# Patient Record
Sex: MALE | Race: WHITE | NOT HISPANIC OR LATINO | URBAN - METROPOLITAN AREA
[De-identification: names, ages, dates, MRNs, and addresses within clinical notes are randomized per-mention and may not be internally consistent; named-entity substitution may affect disease eponyms.]

---

## 2019-09-27 ENCOUNTER — EMERGENCY (EMERGENCY)
Facility: HOSPITAL | Age: 84
LOS: 1 days | End: 2019-09-27
Admitting: EMERGENCY MEDICINE
Payer: MEDICARE

## 2019-09-27 ENCOUNTER — INPATIENT (INPATIENT)
Facility: HOSPITAL | Age: 84
LOS: 17 days | DRG: 23 | End: 2019-10-15
Attending: HOSPITALIST | Admitting: SURGERY
Payer: MEDICARE

## 2019-09-27 VITALS
RESPIRATION RATE: 20 BRPM | HEART RATE: 73 BPM | DIASTOLIC BLOOD PRESSURE: 66 MMHG | OXYGEN SATURATION: 100 % | SYSTOLIC BLOOD PRESSURE: 140 MMHG

## 2019-09-27 DIAGNOSIS — I16.1 HYPERTENSIVE EMERGENCY: ICD-10-CM

## 2019-09-27 DIAGNOSIS — J96.01 ACUTE RESPIRATORY FAILURE WITH HYPOXIA: ICD-10-CM

## 2019-09-27 DIAGNOSIS — I62.9 NONTRAUMATIC INTRACRANIAL HEMORRHAGE, UNSPECIFIED: ICD-10-CM

## 2019-09-27 DIAGNOSIS — I61.9 NONTRAUMATIC INTRACEREBRAL HEMORRHAGE, UNSPECIFIED: ICD-10-CM

## 2019-09-27 DIAGNOSIS — E03.9 HYPOTHYROIDISM, UNSPECIFIED: ICD-10-CM

## 2019-09-27 DIAGNOSIS — I61.5 NONTRAUMATIC INTRACEREBRAL HEMORRHAGE, INTRAVENTRICULAR: ICD-10-CM

## 2019-09-27 LAB
ALBUMIN SERPL ELPH-MCNC: 3.9 G/DL — SIGNIFICANT CHANGE UP (ref 3.3–5.2)
ALP SERPL-CCNC: 65 U/L — SIGNIFICANT CHANGE UP (ref 40–120)
ALT FLD-CCNC: 13 U/L — SIGNIFICANT CHANGE UP
ANION GAP SERPL CALC-SCNC: 12 MMOL/L — SIGNIFICANT CHANGE UP (ref 5–17)
APTT BLD: 27.2 SEC — LOW (ref 27.5–36.3)
AST SERPL-CCNC: 16 U/L — SIGNIFICANT CHANGE UP
BASOPHILS # BLD AUTO: 0.05 K/UL — SIGNIFICANT CHANGE UP (ref 0–0.2)
BASOPHILS NFR BLD AUTO: 0.5 % — SIGNIFICANT CHANGE UP (ref 0–2)
BILIRUB SERPL-MCNC: 0.4 MG/DL — SIGNIFICANT CHANGE UP (ref 0.4–2)
BLD GP AB SCN SERPL QL: SIGNIFICANT CHANGE UP
BUN SERPL-MCNC: 16 MG/DL — SIGNIFICANT CHANGE UP (ref 8–20)
CALCIUM SERPL-MCNC: 8.6 MG/DL — SIGNIFICANT CHANGE UP (ref 8.6–10.2)
CHLORIDE SERPL-SCNC: 102 MMOL/L — SIGNIFICANT CHANGE UP (ref 98–107)
CO2 SERPL-SCNC: 24 MMOL/L — SIGNIFICANT CHANGE UP (ref 22–29)
CREAT SERPL-MCNC: 0.84 MG/DL — SIGNIFICANT CHANGE UP (ref 0.5–1.3)
EOSINOPHIL # BLD AUTO: 0.02 K/UL — SIGNIFICANT CHANGE UP (ref 0–0.5)
EOSINOPHIL NFR BLD AUTO: 0.2 % — SIGNIFICANT CHANGE UP (ref 0–6)
GAS PNL BLDA: SIGNIFICANT CHANGE UP
GLUCOSE SERPL-MCNC: 102 MG/DL — SIGNIFICANT CHANGE UP (ref 70–115)
HCT VFR BLD CALC: 40.2 % — SIGNIFICANT CHANGE UP (ref 39–50)
HGB BLD-MCNC: 12.7 G/DL — LOW (ref 13–17)
IMM GRANULOCYTES NFR BLD AUTO: 0.7 % — SIGNIFICANT CHANGE UP (ref 0–1.5)
INR BLD: 0.98 RATIO — SIGNIFICANT CHANGE UP (ref 0.88–1.16)
LYMPHOCYTES # BLD AUTO: 0.72 K/UL — LOW (ref 1–3.3)
LYMPHOCYTES # BLD AUTO: 6.9 % — LOW (ref 13–44)
MAGNESIUM SERPL-MCNC: 1.8 MG/DL — SIGNIFICANT CHANGE UP (ref 1.6–2.6)
MCHC RBC-ENTMCNC: 31.3 PG — SIGNIFICANT CHANGE UP (ref 27–34)
MCHC RBC-ENTMCNC: 31.6 GM/DL — LOW (ref 32–36)
MCV RBC AUTO: 99 FL — SIGNIFICANT CHANGE UP (ref 80–100)
MONOCYTES # BLD AUTO: 0.79 K/UL — SIGNIFICANT CHANGE UP (ref 0–0.9)
MONOCYTES NFR BLD AUTO: 7.5 % — SIGNIFICANT CHANGE UP (ref 2–14)
NEUTROPHILS # BLD AUTO: 8.84 K/UL — HIGH (ref 1.8–7.4)
NEUTROPHILS NFR BLD AUTO: 84.2 % — HIGH (ref 43–77)
PLATELET # BLD AUTO: 156 K/UL — SIGNIFICANT CHANGE UP (ref 150–400)
POTASSIUM SERPL-MCNC: 4 MMOL/L — SIGNIFICANT CHANGE UP (ref 3.5–5.3)
POTASSIUM SERPL-SCNC: 4 MMOL/L — SIGNIFICANT CHANGE UP (ref 3.5–5.3)
PROT SERPL-MCNC: 6.6 G/DL — SIGNIFICANT CHANGE UP (ref 6.6–8.7)
PROTHROM AB SERPL-ACNC: 11.3 SEC — SIGNIFICANT CHANGE UP (ref 10–12.9)
RBC # BLD: 4.06 M/UL — LOW (ref 4.2–5.8)
RBC # FLD: 14 % — SIGNIFICANT CHANGE UP (ref 10.3–14.5)
SODIUM SERPL-SCNC: 138 MMOL/L — SIGNIFICANT CHANGE UP (ref 135–145)
TROPONIN T SERPL-MCNC: <0.01 NG/ML — SIGNIFICANT CHANGE UP (ref 0–0.06)
WBC # BLD: 10.49 K/UL — SIGNIFICANT CHANGE UP (ref 3.8–10.5)
WBC # FLD AUTO: 10.49 K/UL — SIGNIFICANT CHANGE UP (ref 3.8–10.5)

## 2019-09-27 PROCEDURE — 99291 CRITICAL CARE FIRST HOUR: CPT | Mod: 25

## 2019-09-27 PROCEDURE — 93010 ELECTROCARDIOGRAM REPORT: CPT

## 2019-09-27 PROCEDURE — 99291 CRITICAL CARE FIRST HOUR: CPT

## 2019-09-27 PROCEDURE — 61210 BURR HOLE IMPLT VENTR CATH: CPT

## 2019-09-27 PROCEDURE — 72125 CT NECK SPINE W/O DYE: CPT | Mod: 26

## 2019-09-27 PROCEDURE — 31500 INSERT EMERGENCY AIRWAY: CPT

## 2019-09-27 PROCEDURE — 70498 CT ANGIOGRAPHY NECK: CPT | Mod: 26

## 2019-09-27 PROCEDURE — 70450 CT HEAD/BRAIN W/O DYE: CPT | Mod: 26

## 2019-09-27 PROCEDURE — 71045 X-RAY EXAM CHEST 1 VIEW: CPT | Mod: 26

## 2019-09-27 PROCEDURE — 70496 CT ANGIOGRAPHY HEAD: CPT | Mod: 26

## 2019-09-27 PROCEDURE — 70450 CT HEAD/BRAIN W/O DYE: CPT | Mod: 26,59

## 2019-09-27 PROCEDURE — 99223 1ST HOSP IP/OBS HIGH 75: CPT | Mod: 25

## 2019-09-27 RX ORDER — CHLORHEXIDINE GLUCONATE 213 G/1000ML
15 SOLUTION TOPICAL EVERY 12 HOURS
Refills: 0 | Status: DISCONTINUED | OUTPATIENT
Start: 2019-09-27 | End: 2019-09-28

## 2019-09-27 RX ORDER — NIMODIPINE 60 MG/10ML
60 SOLUTION ORAL EVERY 4 HOURS
Refills: 0 | Status: DISCONTINUED | OUTPATIENT
Start: 2019-09-27 | End: 2019-09-27

## 2019-09-27 RX ORDER — PROPOFOL 10 MG/ML
10 INJECTION, EMULSION INTRAVENOUS
Qty: 500 | Refills: 0 | Status: DISCONTINUED | OUTPATIENT
Start: 2019-09-27 | End: 2019-09-27

## 2019-09-27 RX ORDER — CEFAZOLIN SODIUM 1 G
2000 VIAL (EA) INJECTION ONCE
Refills: 0 | Status: COMPLETED | OUTPATIENT
Start: 2019-09-27 | End: 2019-09-27

## 2019-09-27 RX ORDER — MIDAZOLAM HYDROCHLORIDE 1 MG/ML
2 INJECTION, SOLUTION INTRAMUSCULAR; INTRAVENOUS ONCE
Refills: 0 | Status: DISCONTINUED | OUTPATIENT
Start: 2019-09-27 | End: 2019-09-27

## 2019-09-27 RX ORDER — FENTANYL CITRATE 50 UG/ML
50 INJECTION INTRAVENOUS ONCE
Refills: 0 | Status: DISCONTINUED | OUTPATIENT
Start: 2019-09-27 | End: 2019-09-27

## 2019-09-27 RX ORDER — DESMOPRESSIN ACETATE 0.1 MG/1
0.4 TABLET ORAL ONCE
Refills: 0 | Status: DISCONTINUED | OUTPATIENT
Start: 2019-09-27 | End: 2019-09-27

## 2019-09-27 RX ORDER — FENTANYL CITRATE 50 UG/ML
0.5 INJECTION INTRAVENOUS
Qty: 2500 | Refills: 0 | Status: DISCONTINUED | OUTPATIENT
Start: 2019-09-27 | End: 2019-09-28

## 2019-09-27 RX ORDER — PROPOFOL 10 MG/ML
10 INJECTION, EMULSION INTRAVENOUS
Qty: 1000 | Refills: 0 | Status: DISCONTINUED | OUTPATIENT
Start: 2019-09-27 | End: 2019-09-27

## 2019-09-27 RX ORDER — PANTOPRAZOLE SODIUM 20 MG/1
40 TABLET, DELAYED RELEASE ORAL DAILY
Refills: 0 | Status: DISCONTINUED | OUTPATIENT
Start: 2019-09-27 | End: 2019-10-08

## 2019-09-27 RX ORDER — CHLORHEXIDINE GLUCONATE 213 G/1000ML
1 SOLUTION TOPICAL ONCE
Refills: 0 | Status: COMPLETED | OUTPATIENT
Start: 2019-09-27 | End: 2019-09-28

## 2019-09-27 RX ORDER — CALCIUM GLUCONATE 100 MG/ML
2 VIAL (ML) INTRAVENOUS ONCE
Refills: 0 | Status: COMPLETED | OUTPATIENT
Start: 2019-09-27 | End: 2019-09-27

## 2019-09-27 RX ORDER — SODIUM CHLORIDE 9 MG/ML
1000 INJECTION INTRAMUSCULAR; INTRAVENOUS; SUBCUTANEOUS
Refills: 0 | Status: DISCONTINUED | OUTPATIENT
Start: 2019-09-27 | End: 2019-10-02

## 2019-09-27 RX ORDER — PROPOFOL 10 MG/ML
30 INJECTION, EMULSION INTRAVENOUS
Qty: 1000 | Refills: 0 | Status: DISCONTINUED | OUTPATIENT
Start: 2019-09-27 | End: 2019-09-28

## 2019-09-27 RX ORDER — DESMOPRESSIN ACETATE 0.1 MG/1
34 TABLET ORAL ONCE
Refills: 0 | Status: COMPLETED | OUTPATIENT
Start: 2019-09-27 | End: 2019-09-27

## 2019-09-27 RX ADMIN — FENTANYL CITRATE 50 MICROGRAM(S): 50 INJECTION INTRAVENOUS at 17:57

## 2019-09-27 RX ADMIN — FENTANYL CITRATE 50 MICROGRAM(S): 50 INJECTION INTRAVENOUS at 17:37

## 2019-09-27 RX ADMIN — CHLORHEXIDINE GLUCONATE 15 MILLILITER(S): 213 SOLUTION TOPICAL at 19:27

## 2019-09-27 RX ADMIN — Medication 200 GRAM(S): at 19:38

## 2019-09-27 RX ADMIN — Medication 2000 MILLIGRAM(S): at 12:38

## 2019-09-27 RX ADMIN — MIDAZOLAM HYDROCHLORIDE 2 MILLIGRAM(S): 1 INJECTION, SOLUTION INTRAMUSCULAR; INTRAVENOUS at 14:29

## 2019-09-27 RX ADMIN — FENTANYL CITRATE 50 MICROGRAM(S): 50 INJECTION INTRAVENOUS at 14:29

## 2019-09-27 RX ADMIN — DESMOPRESSIN ACETATE 234 MICROGRAM(S): 0.1 TABLET ORAL at 13:34

## 2019-09-27 RX ADMIN — FENTANYL CITRATE 50 MICROGRAM(S): 50 INJECTION INTRAVENOUS at 14:30

## 2019-09-27 RX ADMIN — FENTANYL CITRATE 50 MICROGRAM(S): 50 INJECTION INTRAVENOUS at 15:30

## 2019-09-27 RX ADMIN — Medication 100 MILLIGRAM(S): at 12:25

## 2019-09-27 RX ADMIN — PROPOFOL 5.16 MICROGRAM(S)/KG/MIN: 10 INJECTION, EMULSION INTRAVENOUS at 12:37

## 2019-09-27 RX ADMIN — FENTANYL CITRATE 50 MICROGRAM(S): 50 INJECTION INTRAVENOUS at 15:00

## 2019-09-27 NOTE — PROGRESS NOTE ADULT - SUBJECTIVE AND OBJECTIVE BOX
83 yo M with PMH of HTN, presents as transfer from [facility] with L BG ICH with IVH.   Last seen normal was 10 pm last night, wife found him confused, agitated. Possible seizure.  Received Keppra and mannitol.  Of note, takes ASA 81 daily, no other AC/antiplts.    On admission, the patient was:  GCS: 11t ([id number]).  ICH score: 3    VITALS: reviewed.  LABS: reviewed.  IMAGING: Recent imaging studies were reviewed.  MEDICATIONS: reviewed.    EXAMINATION: pt is intubated, off sedation.  General:  calm, cooperative.  HEENT:  EO to voice, EOMI, PERRLA.  Neuro:  awake, alert, follows commands, moves all extremities: LUE 5/5, LLE 5/5, RUE 4-/5 + drift, RLE 3/5.  Cards:  S1S2 present.  Respiratory:  no respiratory distress, intubated.  Abdomen:  soft  Extremities:  no edema  Skin:  warm/dry

## 2019-09-27 NOTE — CONSULT NOTE ADULT - ASSESSMENT
83 y/o male transferred from [facility] with a large left basal ganglia hemorrhage, intubated , will need an EVD

## 2019-09-27 NOTE — ED PROVIDER NOTE - PHYSICAL EXAMINATION
VITAL SIGNS: I have reviewed nursing notes and confirm.  CONSTITUTIONAL: Well-developed; (+) intubated   SKIN: Skin exam is warm and dry, no acute rash.  HEAD: Normocephalic;   EYES: PERRL, ; conjunctiva and sclera clear.  ENT: (+) intubated   NECK: Supple; non tender.    CARD: S1, S2 normal; no murmurs, gallops, or rubs. Regular rate and rhythm.  RESP: No wheezes,  no rales or rhonchi.   ABD:  soft; non-distended; non-tender;   EXT: Normal ROM. No clubbing, cyanosis or edema.  NEURO: (+) intubated (+) follows command (+) moves all extremity.

## 2019-09-27 NOTE — CONSULT NOTE ADULT - SUBJECTIVE AND OBJECTIVE BOX
HPI:  83 y/o male transferred from [facility] with a 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 Spoke with patients wife about events this morning. As per wife ,  woke up normal , walked around but came back to bed. When he came into bed, his legs stiffened, he was not able to talk, rolled his legs off the bed, his body followed. His wife put a pillow under his head, he began to foam at the mouth.  No loss of urine or tongue biting. Wife called ambulance, he was taken to [facility], intubated for airway protection, cat scan results show large left basal ganglia hemorrhage. Patient was than transferred to [facility] for higher level of care      PAST MEDICAL & SURGICAL HISTORY: HTN, Hypothyroid      REVIEW OF SYSTEMS: unable to ask as pt is intubated       General:	    Skin/Breast:  	  Ophthalmologic:  	  ENMT:	    Respiratory and Thorax:  	  Cardiovascular:	    Gastrointestinal:	    Genitourinary:	    Musculoskeletal:	    Neurological:	    Psychiatric:	    Hematology/Lymphatics:	    Endocrine:	    Allergic/Immunologic:	    MEDICATIONS  (STANDING):  desmopressin IVPB 34 MICROGram(s) IV Intermittent once  propofol Infusion 10 MICROgram(s)/kG/Min (5.16 mL/Hr) IV Continuous <Continuous>    MEDICATIONS  (PRN):      Allergies    No Known Allergies    Intolerances        SOCIAL HISTORY: unknown     FAMILY HISTORY: lives with his wife      Vital Signs Last 24 Hrs  T(C): 36.4 (27 Sep 2019 12:50), Max: 36.4 (27 Sep 2019 12:50)  T(F): 97.6 (27 Sep 2019 12:50), Max: 97.6 (27 Sep 2019 12:50)  HR: 62 (27 Sep 2019 13:09) (55 - 79)  BP: 149/62 (27 Sep 2019 13:09) (140/66 - 159/72)  BP(mean): --  RR: 20 (27 Sep 2019 13:09) (20 - 21)  SpO2: 99% (27 Sep 2019 13:09) (98% - 100%)    PHYSICAL EXAM:      Constitutional: intubated, not on sedation. Appears stated age.         Neurological: intubated, not sedated. Following commands, shows 2 fingers.  Opens eyes to name. Pupils equally reactive bilaterally. RESENDIZ x4 with the right side  weaker than right. LUE/LLE 5/5 and RUE 4/5 , RLE 3/5. + right UE drift        LABS:                        12.7   10.49 )-----------( 156      ( 27 Sep 2019 12:00 )             40.2     09-27    138  |  102  |  16.0  ----------------------------<  102  4.0   |  24.0  |  0.84    Ca    8.6      27 Sep 2019 12:00  Mg     1.8     09-27    TPro  6.6  /  Alb  3.9  /  TBili  0.4  /  DBili  x   /  AST  16  /  ALT  13  /  AlkPhos  65  09-27    PT/INR - ( 27 Sep 2019 12:00 )   PT: 11.3 sec;   INR: 0.98 ratio         PTT - ( 27 Sep 2019 12:00 )  PTT:27.2 sec      RADIOLOGY & ADDITIONAL STUDIES:       CT Angio Head w/ IV Cont (09.27.19 @ 11:25)   EXAM:  CT ANGIO NECK (W)AW IC                          PROCEDURE DATE:  09/27/2019          INTERPRETATION:  CLINICAL INDICATIONS:head bleed    TECHNIQUE: CTA brain and neck. 95 cc Omnipaque 350 Intravenous contrast   was administered. 2-D MIP and 3-D volume rendering images.    COMPARISON: None    FINDINGS:    NONCONTRAST CT HEAD:  Acute left basal ganglia hemorrhage on image 35, series 4 measures 1.9 x   1.4 cm. There is dissection of hemorrhage into the left thalamus on left   lateral ventricle. Large amount of hemorrhage in the left lateral   ventricle. Mild to moderate amount of hemorrhage in the right lateral   ventricle, third ventricle, cerebral aqueduct, and fourth ventricle.   Moderate microvascular changes. 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 The patient is intubated.    CTA BRAIN: Fetal origin to the right posterior cerebral artery.  The [place] and vertebrobasilar system are unremarkable without   evidence of stenosis, occlusion or saccular aneurysm dilation. No   evidence for arterial venous malformation. The vertebral arteries are   codominant.      CTA NECK: Mild calcific and soft plaque in the left carotid bulbwithout   significant stenosis. Mild calcific plaque in the right carotid bulb.  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 There is normal relationship to   the great vessels. The common carotid arteries, internal carotid arteries   and vertebral arteries shows no evidence of significant stenosis,   occlusion or saccular aneurysm dilation. The vertebral arteries are   codominant.      IMPRESSION:      Acute left basal ganglia hemorrhage with dissection of hemorrhage into   the left thalamus and ventricles. No evidence of aneurysm or   arteriovenous malformation.

## 2019-09-27 NOTE — H&P ADULT - PROBLEM SELECTOR PLAN 1
SICU admit.  Close neuro monitoring.  EVD has been placed for drainage, monitor ICPs.  Hold chemical DVT ppx, SCDs only.  Monitor serum sodium and osm.  Keep high normal.  If ICPs elevate from edema, hypertonic saline therapy.  Maintain euglycemia, normothermia.  F/u CT head post EVD placement.  CTA reviewed.  No vascular anomaly.  neuro consult.

## 2019-09-27 NOTE — ED ADULT NURSE REASSESSMENT NOTE - NS ED NURSE REASSESS COMMENT FT1
pt continues to pull at IV lines and clothing at this time, following basic commands, Dr. Cha aware pt pulling at IV lines, to start on IV diprivan gtt. per protocol,

## 2019-09-27 NOTE — ED ADULT TRIAGE NOTE - CHIEF COMPLAINT QUOTE
transfer from [facility], intubated, brain bleed, accepted by Dr Rcok, sent to critical care upon ED arrival, priority CT called by Dr Cha

## 2019-09-27 NOTE — ED PROVIDER NOTE - OBJECTIVE STATEMENT
83 yo M with left sided head bleed. transfer from [facility] 83 yo M  hx of HTN found to have large left sided brain bleed transfer from [facility]. last seen normal was 10 pm last night. wife found him confused aggitated with seizure. He was given keppra and manitol.

## 2019-09-27 NOTE — ED PROVIDER NOTE - CLINICAL SUMMARY MEDICAL DECISION MAKING FREE TEXT BOX
85 yo M transferred from [facility] for brain hemorrhage, will go to SICU.

## 2019-09-27 NOTE — PHARMACOTHERAPY INTERVENTION NOTE - COMMENTS
MD entered 0.4mcg, dose is 0.4mcg/kg for intracranial bleeding associated with antiplatelets. Spoke with MD, recommended 34mcg

## 2019-09-27 NOTE — ED ADULT NURSE NOTE - OBJECTIVE STATEMENT
pt BIB [facility] transfer from [place] ED with + brain bleed and a + r shift. received pt intubated but responds to verbal commands. Dr. Cha at bedside eval pt, pt went directly to CT angio per protocol. Neuro PA at bedside eval pt.

## 2019-09-27 NOTE — ED PROVIDER NOTE - PROGRESS NOTE DETAILS
Neurosurgery contact, wants a stat CTA head. priority CT head ordered.   Given the significant and immediate threats to this patient based on initial presentation, the benefits of emergency contrast-enhanced CT imaging without obtaining GFR/creatinine serum level results greatly outweigh the potential risk of harm due to contrast-induced nephropathy Neurosurgery recommend admit to SICU for EVD, maintaian -160, DDAVP 0.4mg  IV

## 2019-09-27 NOTE — ED ADULT NURSE NOTE - CHIEF COMPLAINT QUOTE
transfer from [facility], intubated, brain bleed, accepted by Dr Rock, sent to critical care upon ED arrival, priority CT called by Dr Cha

## 2019-09-27 NOTE — H&P ADULT - HISTORY OF PRESENT ILLNESS
Unable to obtain full hx and pt intubated and AMS.    85 y/o M with reported hx of HTN and hypothyroid transferred to [facility] for intracranial hemorrhage management and neurosurgical eval.  Intubated for transfer.  EVD placed.

## 2019-09-27 NOTE — PROGRESS NOTE ADULT - ASSESSMENT
Assessment:  85 yo M with h/o HTN, presents with LBG ICH/IVH, possible seizures. Etiology - like HTN.  CTA w/o signs of vascular pathology.    Plan:  -please, continue neurochecks q1 hr in ICU settings (accepted by SICU)  -please, monitor off sedation; would use Fentanyl IVP q2hr PRN for sedation/pain control; would consider addition of Propofol if requires sedation, goal RASS 0 to -2  -please, maintain SBP<140; Cardene PRN  -will place EVD now - will receive DDAVP prior to procedure as taking ASA    -stability CTh in 6 hrs  -would recommend the following TBF goals: ICP goal< 22, CPP goal 60-70  -sz treatment - please, continue Keppra 1 gr BID  -vent support - normocapnea and O2sat >92%; please, check ABGs  -monitor Na, would recommend 140-145 as a goal  -please, keep Plt > 100,000   -will follow    Patient is critically ill and at high risk of death or neurologic complications due to re-bleeding, brain swelling/ compression/ herniation, cardiorespiratory failure.

## 2019-09-27 NOTE — H&P ADULT - NSHPPHYSICALEXAM_GEN_ALL_CORE
NAD  GCS 8T (1,1T,6), normal power on left, R sided weakness  RRR  non labored on vent.  AC/VC. Set to 20 BPM, breathing 20 BPM, end tidal CO2 monitor 24mmHG  abd soft  no pedal edema  trachea midline  PERRLA  skin dry, normal tone, warm

## 2019-09-27 NOTE — H&P ADULT - PROBLEM SELECTOR PLAN 4
Unclear reason for hypoxia, pa02:FiO2 <200.  possible aspiration or atelectasis.  Will monitor progress clinically and cxr.  Lung protective ventilation.  Need to keep paCO2 and pH near normal.  Given low end tidal reading will check another ABG to calibrate.  Adjust MV down on vent if truly hypocapnic.

## 2019-09-27 NOTE — CONSULT NOTE ADULT - PROBLEM SELECTOR RECOMMENDATION 9
1. Emergent EVD for diversion of CSF, consent signed by patients wife  2. DDAVP ordered   3. SBP between 150 & 160   4. SICU admission

## 2019-09-28 LAB
ANION GAP SERPL CALC-SCNC: 13 MMOL/L — SIGNIFICANT CHANGE UP (ref 5–17)
BASOPHILS # BLD AUTO: 0.03 K/UL — SIGNIFICANT CHANGE UP (ref 0–0.2)
BASOPHILS NFR BLD AUTO: 0.3 % — SIGNIFICANT CHANGE UP (ref 0–2)
BUN SERPL-MCNC: 16 MG/DL — SIGNIFICANT CHANGE UP (ref 8–20)
CALCIUM SERPL-MCNC: 8.5 MG/DL — LOW (ref 8.6–10.2)
CHLORIDE SERPL-SCNC: 105 MMOL/L — SIGNIFICANT CHANGE UP (ref 98–107)
CO2 SERPL-SCNC: 23 MMOL/L — SIGNIFICANT CHANGE UP (ref 22–29)
CREAT SERPL-MCNC: 0.84 MG/DL — SIGNIFICANT CHANGE UP (ref 0.5–1.3)
EOSINOPHIL # BLD AUTO: 0.02 K/UL — SIGNIFICANT CHANGE UP (ref 0–0.5)
EOSINOPHIL NFR BLD AUTO: 0.2 % — SIGNIFICANT CHANGE UP (ref 0–6)
GAS PNL BLDA: SIGNIFICANT CHANGE UP
GLUCOSE BLDC GLUCOMTR-MCNC: 101 MG/DL — HIGH (ref 70–99)
GLUCOSE BLDC GLUCOMTR-MCNC: 101 MG/DL — HIGH (ref 70–99)
GLUCOSE SERPL-MCNC: 121 MG/DL — HIGH (ref 70–115)
HCT VFR BLD CALC: 33.9 % — LOW (ref 39–50)
HGB BLD-MCNC: 11.2 G/DL — LOW (ref 13–17)
IMM GRANULOCYTES NFR BLD AUTO: 0.6 % — SIGNIFICANT CHANGE UP (ref 0–1.5)
LYMPHOCYTES # BLD AUTO: 1.2 K/UL — SIGNIFICANT CHANGE UP (ref 1–3.3)
LYMPHOCYTES # BLD AUTO: 13.3 % — SIGNIFICANT CHANGE UP (ref 13–44)
MCHC RBC-ENTMCNC: 31.6 PG — SIGNIFICANT CHANGE UP (ref 27–34)
MCHC RBC-ENTMCNC: 33 GM/DL — SIGNIFICANT CHANGE UP (ref 32–36)
MCV RBC AUTO: 95.8 FL — SIGNIFICANT CHANGE UP (ref 80–100)
MONOCYTES # BLD AUTO: 0.86 K/UL — SIGNIFICANT CHANGE UP (ref 0–0.9)
MONOCYTES NFR BLD AUTO: 9.5 % — SIGNIFICANT CHANGE UP (ref 2–14)
NEUTROPHILS # BLD AUTO: 6.85 K/UL — SIGNIFICANT CHANGE UP (ref 1.8–7.4)
NEUTROPHILS NFR BLD AUTO: 76.1 % — SIGNIFICANT CHANGE UP (ref 43–77)
PLATELET # BLD AUTO: 131 K/UL — LOW (ref 150–400)
POTASSIUM SERPL-MCNC: 3.9 MMOL/L — SIGNIFICANT CHANGE UP (ref 3.5–5.3)
POTASSIUM SERPL-SCNC: 3.9 MMOL/L — SIGNIFICANT CHANGE UP (ref 3.5–5.3)
RBC # BLD: 3.54 M/UL — LOW (ref 4.2–5.8)
RBC # FLD: 14.3 % — SIGNIFICANT CHANGE UP (ref 10.3–14.5)
SODIUM SERPL-SCNC: 141 MMOL/L — SIGNIFICANT CHANGE UP (ref 135–145)
WBC # BLD: 9.01 K/UL — SIGNIFICANT CHANGE UP (ref 3.8–10.5)
WBC # FLD AUTO: 9.01 K/UL — SIGNIFICANT CHANGE UP (ref 3.8–10.5)

## 2019-09-28 PROCEDURE — 99232 SBSQ HOSP IP/OBS MODERATE 35: CPT

## 2019-09-28 PROCEDURE — 71045 X-RAY EXAM CHEST 1 VIEW: CPT | Mod: 26

## 2019-09-28 PROCEDURE — 99291 CRITICAL CARE FIRST HOUR: CPT

## 2019-09-28 PROCEDURE — 99223 1ST HOSP IP/OBS HIGH 75: CPT

## 2019-09-28 RX ADMIN — CHLORHEXIDINE GLUCONATE 15 MILLILITER(S): 213 SOLUTION TOPICAL at 05:10

## 2019-09-28 RX ADMIN — PANTOPRAZOLE SODIUM 40 MILLIGRAM(S): 20 TABLET, DELAYED RELEASE ORAL at 11:55

## 2019-09-28 RX ADMIN — CHLORHEXIDINE GLUCONATE 1 APPLICATION(S): 213 SOLUTION TOPICAL at 05:10

## 2019-09-28 NOTE — PROGRESS NOTE ADULT - SUBJECTIVE AND OBJECTIVE BOX
24h Events:  [place] [facility] [place] of spontaneous SAH. EVD placed.     Subjective:  Due to the patient's current medical condition the patient is unable to participate in a medical interview and cannot provide a subjective history.     ICU Vital Signs Last 24 Hrs  T(C): 37.8 (28 Sep 2019 00:00), Max: 37.9 (27 Sep 2019 20:00)  T(F): 100 (28 Sep 2019 00:00), Max: 100.2 (27 Sep 2019 20:00)  HR: 57 (28 Sep 2019 01:00) (55 - 81)  BP: 161/67 (28 Sep 2019 01:00) (119/56 - 168/69)  BP(mean): 97 (28 Sep 2019 01:00) (80 - 99)  ABP: --  ABP(mean): --  RR: 14 (28 Sep 2019 01:00) (14 - 26)  SpO2: 98% (28 Sep 2019 01:00) (50% - 100%)      I&O's Detail    27 Sep 2019 07:01  -  28 Sep 2019 02:58  --------------------------------------------------------  IN:    propofol Infusion: 92.3 mL    propofol Infusion: 43.9 mL    sodium chloride 0.9%.: 750 mL    Solution: 100 mL  Total IN: 986.2 mL    OUT:    External Ventricular Device: 35 mL    Indwelling Catheter - Urethral: 1020 mL  Total OUT: 1055 mL    Total NET: -68.8 mL          ABG - ( 27 Sep 2019 20:52 )  pH, Arterial: 7.44  pH, Blood: x     /  pCO2: 38    /  pO2: 94    / HCO3: 26    / Base Excess: 1.7   /  SaO2: 98                  MEDICATIONS  (STANDING):  chlorhexidine 0.12% Liquid 15 milliLiter(s) Oral Mucosa every 12 hours  chlorhexidine 2% Cloths 1 Application(s) Topical once  pantoprazole  Injectable 40 milliGRAM(s) IV Push daily  propofol Infusion 30 MICROgram(s)/kG/Min (15.48 mL/Hr) IV Continuous <Continuous>  sodium chloride 0.9%. 1000 milliLiter(s) (75 mL/Hr) IV Continuous <Continuous>    MEDICATIONS  (PRN):          Physical Exam:    Gen: Intubated    HEENT: PERRL, EOMI    Neurological: Doesn't open eyes, follows simple commands with LUE, right sided weakness    Neck: Trachea midline, no evidence of JVD, FROM without pain, neck symmetric    Pulmonary: CTAB with decreased breath sounds at the bases    Cardiovascular: S1S2    Gastrointestinal: Soft, non-tender, non-distended    : Morris in place draining yellow urine    Extremities: Without c/c/e    Skin: Intact    Musculoskeletal: FROM without pain, no deformity or areas of tenderness    LABS:  Pending        ASSESSMENT/PLAN:  84y Male with basal ganglia hemorrhage stroke with intravesicular extension, neurologically stable. ICPs normal.     Neuro: Continue to monitor ICPs, frequent neuro checks, sedation with propofol    CV: Continue non-invasive blood pressure monitoring     Pulm: Will transition to pressure support today. Cognitive status would preclude extubation even if he does well on pressure support.     GI/Nutrition: Will likely start tube feedings today    /Renal: Morris for strict I&O    ID: No issue    Endo: Maintain euglycemia, will start ISS if necessary    Skin: Repositioning for DTI prevention while in bed    Heme/DVT Prophylaxis: SCDs only 2/2 ICH    Dispo: Continue in SICU

## 2019-09-28 NOTE — PROVIDER CONTACT NOTE (OTHER) - RECOMMENDATIONS
Placed patient on .50 Venti mask due to desaturation and patient obvious mouth breathing.  Discussion of plan if further deterioration.  Chest PT with percussion bed.

## 2019-09-28 NOTE — AIRWAY REMOVAL NOTE  ADULT & PEDS - ARTIFICAL AIRWAY REMOVAL COMMENTS
Patient suctioned orally and endotracheally pre extubation and orally post.  ETco2 32.  Tolerated well.  Patient still a little sleepy although following commands.  ALDEN Strong was at bedside and aware.

## 2019-09-28 NOTE — CONSULT NOTE ADULT - SUBJECTIVE AND OBJECTIVE BOX
[facility]                                     [facility] [place]                                 Frieda Saunders, & Jose                                  [address]                                        [place], [place], [address]                                             [phone]    CC: ICH  HPI:  Unable to obtain full hx and pt intubated and AMS.    85 y/o M with reported hx of HTN and hypothyroid transferred to [facility] for intracranial hemorrhage management and neurosurgical eval.  Intubated for transfer.  EVD placed. (27 Sep 2019 17:30)  The patient is a 84y Male who presented as a transfer to [facility] for management of ICH.  He apparently may have had seizure and right sided weakness and was brought to [facility].  CT head showed left BG ICH with intraventricular extension,    PAST MEDICAL & SURGICAL HISTORY:  Hypothyroid  HTN (hypertension)      MEDICATIONS  (STANDING):  pantoprazole  Injectable 40 milliGRAM(s) IV Push daily  sodium chloride 0.9%. 1000 milliLiter(s) (75 mL/Hr) IV Continuous <Continuous>    MEDICATIONS  (PRN):      Allergies    No Known Allergies    Intolerances    SOCIAL HISTORY:  no tob,   no alcohol   no drugs    FAMILY HISTORY:  n/c      ROS: 14 point ROS negative other than what is present in HPI or below    Vital Signs Last 24 Hrs  T(C): 37.4 (28 Sep 2019 12:00), Max: 37.9 (27 Sep 2019 20:00)  T(F): 99.3 (28 Sep 2019 12:00), Max: 100.2 (27 Sep 2019 20:00)  HR: 72 (28 Sep 2019 13:00) (53 - 81)  BP: 151/65 (28 Sep 2019 13:00) (119/56 - 168/69)  BP(mean): 94 (28 Sep 2019 13:00) (80 - 99)  RR: 26 (28 Sep 2019 13:00) (14 - 28)  SpO2: 96% (28 Sep 2019 13:00) (50% - 100%)      General: NAD    Detailed Neurologic Exam:    Mental status: The patient is intubated.  unable to assess language/orientation    Cranial nerves: Pupils equal and react symmetrically to light. There is no visual field deficit to threat. Extraocular motion is full with no nystagmus. There is no ptosis. Facial sensation is unable to be assessed. Facial musculature is difficult to assess due to ETT. Palate elevation. Shoulder shrug, and tongue extension can not be assessed    Motor: There is normal bulk and tone.  There is no tremor.  Strength is 2/5 in the right arm and 2-3/5 leg.   Strength is 5/5 in the left arm and leg.    Sensation: Intact to pinch in 4 extremities    Reflexes: 1+ throughout and plantar responses are flexor left, extensor right    Cerebellar: Unable to assess dysmetria on finger to nose testing.    Gait : deferred    LABS:                         11.2   9.01  )-----------( 131      ( 28 Sep 2019 04:22 )             33.9       09-28    141  |  105  |  16.0  ----------------------------<  121<H>  3.9   |  23.0  |  0.84    Ca    8.5<L>      28 Sep 2019 04:22  Mg     1.8     09-27    TPro  6.6  /  Alb  3.9  /  TBili  0.4  /  DBili  x   /  AST  16  /  ALT  13  /  AlkPhos  65  09-27      PT/INR - ( 27 Sep 2019 12:00 )   PT: 11.3 sec;   INR: 0.98 ratio         PTT - ( 27 Sep 2019 12:00 )  PTT:27.2 sec    RADIOLOGY & ADDITIONAL STUDIES (independently reviewed unless otherwise noted):  head CT large left BG ICH extension to thalamus and ventricles, EVD in place, no mass noted.  CTA head: no aneurysm, AVM, LVO or sig stenosis in COW  CTA neck: no sig carotid or vertebral stenosis, no dissection

## 2019-09-28 NOTE — PROVIDER CONTACT NOTE (OTHER) - ASSESSMENT
Patient without respiratory distress although audible secretions in oral pharynx needed intervention of suctioning.

## 2019-09-28 NOTE — PROGRESS NOTE ADULT - SUBJECTIVE AND OBJECTIVE BOX
Subjective: Intubated, weaning of sedation to possibly extubate today. [place] [facility] [place] with acute large left BGH with intraventricular extension , needed an emergent EVD placement which was done at the bedside in the ICU. Consent was obtained from patient spouse.     T(C): 37.4 (09-28-19 @ 08:00), Max: 37.9 (09-27-19 @ 20:00)  HR: 56 (09-28-19 @ 10:00) (53 - 81)  BP: 140/63 (09-28-19 @ 10:00) (119/56 - 168/69)  RR: 23 (09-28-19 @ 10:00) (14 - 26)  SpO2: 97% (09-28-19 @ 10:00) (50% - 100%)  Wt(kg): --    Exam: Intubated, appropriately responds to stimuli. Follows simple commands. Right sided weaker than left. Pupils reactive bilaterally     CBC Full  -  ( 28 Sep 2019 04:22 )  WBC Count : 9.01 K/uL  RBC Count : 3.54 M/uL  Hemoglobin : 11.2 g/dL  Hematocrit : 33.9 %  Platelet Count - Automated : 131 K/uL  Mean Cell Volume : 95.8 fl  Mean Cell Hemoglobin : 31.6 pg  Mean Cell Hemoglobin Concentration : 33.0 gm/dL  Auto Neutrophil # : 6.85 K/uL  Auto Lymphocyte # : 1.20 K/uL  Auto Monocyte # : 0.86 K/uL  Auto Eosinophil # : 0.02 K/uL  Auto Basophil # : 0.03 K/uL  Auto Neutrophil % : 76.1 %  Auto Lymphocyte % : 13.3 %  Auto Monocyte % : 9.5 %  Auto Eosinophil % : 0.2 %  Auto Basophil % : 0.3 %    09-28    141  |  105  |  16.0  ----------------------------<  121<H>  3.9   |  23.0  |  0.84    Ca    8.5<L>      28 Sep 2019 04:22  Mg     1.8     09-27    TPro  6.6  /  Alb  3.9  /  TBili  0.4  /  DBili  x   /  AST  16  /  ALT  13  /  AlkPhos  65  09-27    PT/INR - ( 27 Sep 2019 12:00 )   PT: 11.3 sec;   INR: 0.98 ratio         PTT - ( 27 Sep 2019 12:00 )  PTT:27.2 sec      Wound: EVD in place, C/D/I     EVD at 10 cm H20 58 cc overnight    Imaging:    CT Head No Cont (09.27.19 @ 18:41)   EXAM:  CT BRAIN                          PROCEDURE DATE:  09/27/2019          INTERPRETATION:  Head CT without contrast   COMPARISON: 9/27/2000 1911 6:00 AM.  CLINICAL INFORMATION: Ventriculostomy catheter placement following   intracranial hemorrhage and intraventricular hemorrhage..  TECHNIQUE: Contiguous axial 2.5 mm slice thickness images of the head   were obtained without the use of intravenous contrast media.  This scan was performed using automatic exposure control (radiation dose   reduction software) to obtain a diagnostic image quality scan with   patient dose as low as reasonably achievable.     FINDINGS:  RIGHT frontal ventriculostomy catheter through kane hole  The ventricles and the LEFT lateral ventricle.  Stable LEFT basalganglia hemorrhage with dissection into the lateral   third and fourth ventricles. No epidural or subdural hemorrhage. No other   change has occurred. Scratch   IMPRESSION:    LEFT basal ganglia hemorrhage with dissection into the ventricles as   described. RIGHT frontal tracheostomy catheter with distal tip in the   LEFT ventricular frontal horn.

## 2019-09-28 NOTE — CONSULT NOTE ADULT - ASSESSMENT
The patient is a 84y Male who is followed by neurology because of ICH    ICH  likely hypertensive  avoid anti platelet medications  avoid anti coagulant medications  control BP, avoid SBP>140 titrate with cardene as needed  can allow sodium to rise   maintain normal CPP (60-70) to prevent CVA from hypoperfusion, ICP under 22'  continue keppra for seizure prevention  q1h neurochecks  PT/OT when tolerated    will follow with you    Oscar Malave MD PhD   [id number]

## 2019-09-28 NOTE — PROVIDER CONTACT NOTE (OTHER) - ACTION/TREATMENT ORDERED:
Patient to remain on venturi mask with intermittent nasal suction as needed and continue to monitor for need to reintubate patient due to inability to protect airway.

## 2019-09-29 LAB
ANION GAP SERPL CALC-SCNC: 9 MMOL/L — SIGNIFICANT CHANGE UP (ref 5–17)
BUN SERPL-MCNC: 15 MG/DL — SIGNIFICANT CHANGE UP (ref 8–20)
CALCIUM SERPL-MCNC: 8.5 MG/DL — LOW (ref 8.6–10.2)
CHLORIDE SERPL-SCNC: 104 MMOL/L — SIGNIFICANT CHANGE UP (ref 98–107)
CO2 SERPL-SCNC: 26 MMOL/L — SIGNIFICANT CHANGE UP (ref 22–29)
CREAT SERPL-MCNC: 0.8 MG/DL — SIGNIFICANT CHANGE UP (ref 0.5–1.3)
GLUCOSE BLDC GLUCOMTR-MCNC: 100 MG/DL — HIGH (ref 70–99)
GLUCOSE BLDC GLUCOMTR-MCNC: 85 MG/DL — SIGNIFICANT CHANGE UP (ref 70–99)
GLUCOSE BLDC GLUCOMTR-MCNC: 90 MG/DL — SIGNIFICANT CHANGE UP (ref 70–99)
GLUCOSE SERPL-MCNC: 102 MG/DL — SIGNIFICANT CHANGE UP (ref 70–115)
HCT VFR BLD CALC: 34.5 % — LOW (ref 39–50)
HGB BLD-MCNC: 11.2 G/DL — LOW (ref 13–17)
MAGNESIUM SERPL-MCNC: 1.8 MG/DL — SIGNIFICANT CHANGE UP (ref 1.6–2.6)
MCHC RBC-ENTMCNC: 31.4 PG — SIGNIFICANT CHANGE UP (ref 27–34)
MCHC RBC-ENTMCNC: 32.5 GM/DL — SIGNIFICANT CHANGE UP (ref 32–36)
MCV RBC AUTO: 96.6 FL — SIGNIFICANT CHANGE UP (ref 80–100)
PHOSPHATE SERPL-MCNC: 2.7 MG/DL — SIGNIFICANT CHANGE UP (ref 2.4–4.7)
PLATELET # BLD AUTO: 134 K/UL — LOW (ref 150–400)
POTASSIUM SERPL-MCNC: 3.9 MMOL/L — SIGNIFICANT CHANGE UP (ref 3.5–5.3)
POTASSIUM SERPL-SCNC: 3.9 MMOL/L — SIGNIFICANT CHANGE UP (ref 3.5–5.3)
RBC # BLD: 3.57 M/UL — LOW (ref 4.2–5.8)
RBC # FLD: 14.1 % — SIGNIFICANT CHANGE UP (ref 10.3–14.5)
SODIUM SERPL-SCNC: 139 MMOL/L — SIGNIFICANT CHANGE UP (ref 135–145)
WBC # BLD: 10.17 K/UL — SIGNIFICANT CHANGE UP (ref 3.8–10.5)
WBC # FLD AUTO: 10.17 K/UL — SIGNIFICANT CHANGE UP (ref 3.8–10.5)

## 2019-09-29 PROCEDURE — 71045 X-RAY EXAM CHEST 1 VIEW: CPT | Mod: 26

## 2019-09-29 PROCEDURE — 99232 SBSQ HOSP IP/OBS MODERATE 35: CPT

## 2019-09-29 PROCEDURE — 99291 CRITICAL CARE FIRST HOUR: CPT

## 2019-09-29 RX ORDER — ACETAMINOPHEN 500 MG
650 TABLET ORAL EVERY 6 HOURS
Refills: 0 | Status: DISCONTINUED | OUTPATIENT
Start: 2019-09-29 | End: 2019-09-30

## 2019-09-29 RX ORDER — ALBUTEROL 90 UG/1
2.5 AEROSOL, METERED ORAL EVERY 6 HOURS
Refills: 0 | Status: DISCONTINUED | OUTPATIENT
Start: 2019-09-29 | End: 2019-10-14

## 2019-09-29 RX ORDER — ACETAMINOPHEN 500 MG
650 TABLET ORAL EVERY 6 HOURS
Refills: 0 | Status: DISCONTINUED | OUTPATIENT
Start: 2019-09-29 | End: 2019-10-10

## 2019-09-29 RX ORDER — MAGNESIUM SULFATE 500 MG/ML
2 VIAL (ML) INJECTION ONCE
Refills: 0 | Status: COMPLETED | OUTPATIENT
Start: 2019-09-29 | End: 2019-09-29

## 2019-09-29 RX ORDER — CHLORHEXIDINE GLUCONATE 213 G/1000ML
1 SOLUTION TOPICAL DAILY
Refills: 0 | Status: DISCONTINUED | OUTPATIENT
Start: 2019-09-29 | End: 2019-10-15

## 2019-09-29 RX ADMIN — Medication 650 MILLIGRAM(S): at 18:15

## 2019-09-29 RX ADMIN — CHLORHEXIDINE GLUCONATE 1 APPLICATION(S): 213 SOLUTION TOPICAL at 11:42

## 2019-09-29 RX ADMIN — Medication 650 MILLIGRAM(S): at 18:47

## 2019-09-29 RX ADMIN — PANTOPRAZOLE SODIUM 40 MILLIGRAM(S): 20 TABLET, DELAYED RELEASE ORAL at 11:42

## 2019-09-29 RX ADMIN — Medication 650 MILLIGRAM(S): at 16:00

## 2019-09-29 RX ADMIN — Medication 50 GRAM(S): at 06:25

## 2019-09-29 RX ADMIN — ALBUTEROL 2.5 MILLIGRAM(S): 90 AEROSOL, METERED ORAL at 18:42

## 2019-09-29 RX ADMIN — Medication 650 MILLIGRAM(S): at 15:00

## 2019-09-29 NOTE — PROGRESS NOTE ADULT - ASSESSMENT
The patient is a 84y Male who is followed by neurology because of ICH    ICH  likely hypertensive  avoid anti platelet medications  avoid anti coagulant medications  control BP, avoid SBP>140 titrate with cardene as needed  can allow sodium to rise   maintain normal CPP (60-70) to prevent CVA from hypoperfusion, ICP under 22'  continue keppra for seizure prevention  q1h neurochecks  repeat head CT for any significant neuro changes  PT/OT when tolerated    will follow with you    Oscar Malave MD PhD   [id number]

## 2019-09-29 NOTE — DIETITIAN INITIAL EVALUATION ADULT. - ENTERAL
as medically feasible, recommend Jevity 1.5, initiated at 20 ml/hr and advance 10 ml/hr q4 hrs until goal rate of 65 ml/hr (x20 hrs) to provide 1300 ml, 1950 kcal, 83g protein, 988 ml free water, and >100% of RDIs for vitamins/minerals. Additional free water per MD discretion.

## 2019-09-29 NOTE — PROGRESS NOTE ADULT - SUBJECTIVE AND OBJECTIVE BOX
HISTORY   83 y/o male with large left BGH, intraventricular extension , EVD placed, draining CSF    24 HOUR EVENTS: Patient extubated yesterday, doing well.  Patient able to clear secretions however, remains aphasic, following commands, with eyes closed.  Denies pain by noding head.  VSS stable. EVD draining 70cc in 24hrs.  UOP adequate, mental status precludes ability to have swallow evaluation.  Will place doboff for feeding today.      SUBJECTIVE/ROS:  [ ] A ten-point review of systems was otherwise negative except as noted.  [x ] Due to altered mental status/intubation, subjective information were not able to be obtained from the patient. History was obtained, to the extent possible, from review of the chart and collateral sources of information.      NEURO  RASS:  -1-0   GCS: 1/4/6    CAM ICU: neg  Exam: see below   Meds:   [x] Adequacy of sedation and pain control has been assessed and adjusted      RESPIRATORY  RR: 19 (09-29-19 @ 11:00) (16 - 34)  SpO2: 92% (09-29-19 @ 11:00) (89% - 98%)  Wt(kg): --  Exam: unlabored, clear to auscultation bilaterally  Mechanical Ventilation:   ABG - ( 28 Sep 2019 20:50 )  pH: 7.44  /  pCO2: 39    /  pO2: 67    / HCO3: 26    / Base Excess: 1.8   /  SaO2: 94      Lactate: x                [ ] Extubation Readiness Assessed  Meds:       CARDIOVASCULAR  HR: 60 (09-29-19 @ 11:00) (52 - 91)  BP: 144/63 (09-29-19 @ 11:00) (127/63 - 151/65)  BP(mean): 91 (09-29-19 @ 11:00) (81 - 120)  ABP: --  ABP(mean): --  Wt(kg): --  CVP(cm H2O): --      Exam: NSR  Cardiac Rhythm: RRR  Perfusion     [x ]Adequate   [ ]Inadequate  Mentation   [ ]Normal       [x ]Reduced  Extremities  [x ]Warm         [ ]Cool  Volume Status [ ]Hypervolemic [x ]Euvolemic [ ]Hypovolemic  Meds:       GI/NUTRITION  Exam: softly distended, nttp, no rebound or guarding  Diet: NPO   Meds: pantoprazole  Injectable 40 milliGRAM(s) IV Push daily      GENITOURINARY  I&O's Detail    09-28 @ 07:01 - 09-29 @ 07:00  --------------------------------------------------------  IN:    sodium chloride 0.9%.: 1800 mL    Solution: 50 mL  Total IN: 1850 mL    OUT:    External Ventricular Device: 75 mL    Indwelling Catheter - Urethral: 1350 mL  Total OUT: 1425 mL    Total NET: 425 mL      09-29 @ 07:01 - 09-29 @ 11:36  --------------------------------------------------------  IN:    sodium chloride 0.9%.: 300 mL  Total IN: 300 mL    OUT:    External Ventricular Device: 2 mL    Indwelling Catheter - Urethral: 380 mL  Total OUT: 382 mL    Total NET: -82 mL          09-29    139  |  104  |  15.0  ----------------------------<  102  3.9   |  26.0  |  0.80    Ca    8.5<L>      29 Sep 2019 05:13  Phos  2.7     09-29  Mg     1.8     09-29    TPro  6.6  /  Alb  3.9  /  TBili  0.4  /  DBili  x   /  AST  16  /  ALT  13  /  AlkPhos  65  09-27    [x ] Motnejo catheter, indication: strict i/o's, critically ill  Meds: sodium chloride 0.9%. 1000 milliLiter(s) IV Continuous <Continuous>    Constitutional: sitting upright, eyes closed, drowsy but easily arousable  Eyes: PERRL  Head: NCAT  Neck: trachea midline, FROM  Respiratory: CTA b/l, diminsihed a b/l bases, +coarse on RLL  Cardiovascular: S1S2, RRR  Gastrointestinal: abd. soft, NT/ND  Genitourinary: +montejo  Extremities: no LE edema b/l  Neurological: eyes closed, does not open to noxious stimuli, aphasic when does speak, able to give one word responses at times, motor 5/5 LUE>RUE 4/5, follows commands without difficulty, sensation grossly intact  Skin: warm, dry, intact      HEMATOLOGIC  Meds:   [x] VTE Prophylaxis                        11.2   10.17 )-----------( 134      ( 29 Sep 2019 05:13 )             34.5     PT/INR - ( 27 Sep 2019 12:00 )   PT: 11.3 sec;   INR: 0.98 ratio         PTT - ( 27 Sep 2019 12:00 )  PTT:27.2 sec  Transfusion     [ ] PRBC   [ ] Platelets   [ ] FFP   [ ] Cryoprecipitate      INFECTIOUS DISEASES  T(C): 37.7 (09-29-19 @ 08:00), Max: 38.1 (09-29-19 @ 00:00)  Wt(kg): --  WBC Count: 10.17 K/uL (09-29 @ 05:13)    Recent Cultures:    Meds:       ENDOCRINE  Capillary Blood Glucose    Meds:       ACCESS DEVICES:  [ ] Peripheral IV  [ ] Central Venous Line	[ ] R	[ ] L	[ ] IJ	[ ] Fem	[ ] SC	Placed:   [ ] Arterial Line		[ ] R	[ ] L	[ ] Fem	[ ] Rad	[ ] Ax	Placed:   [ ] PICC:					[ ] Mediport  [ ] Urinary Catheter, Date Placed:   [ ] Necessity of urinary, arterial, and venous catheters discussed    OTHER MEDICATIONS:  chlorhexidine 2% Cloths 1 Application(s) Topical daily      CODE STATUS:     IMAGING:

## 2019-09-29 NOTE — DIETITIAN INITIAL EVALUATION ADULT. - PERTINENT MEDS FT
MEDICATIONS  (STANDING):  chlorhexidine 2% Cloths 1 Application(s) Topical daily  pantoprazole  Injectable 40 milliGRAM(s) IV Push daily  sodium chloride 0.9%. 1000 milliLiter(s) (75 mL/Hr) IV Continuous <Continuous>

## 2019-09-29 NOTE — DIETITIAN INITIAL EVALUATION ADULT. - OTHER INFO
84 year old male with large left BGH, intraventricular extension , EVD placed, draining CSF. Pt lethargic and aphasic at time of interview. No family at bedside to obtain nutrition hx. Aware SLP attempted to see pt, however, pt not appropriate for assessment due to mental status and inability to manage secretions adequately. Per documentation, aware of plan for Dobbhoff placement for feedings.

## 2019-09-29 NOTE — PROGRESS NOTE ADULT - SUBJECTIVE AND OBJECTIVE BOX
[facility]                                     Neurology at [place]                                 Frieda Saunders & Jose                                  [address]                                        [place], [place], [address]                                             [phone]    CC: ICH  HPI: 83 y/o M with reported hx of HTN and hypothyroid transferred to [facility] for intracranial hemorrhage management and neurosurgical eval.  Intubated for transfer.  EVD placed. (27 Sep 2019 17:30)  The patient is a 84y Male who presented as a transfer to [facility] for management of ICH.  He apparently may have had seizure and right sided weakness and was brought to [facility].  CT head showed left BG ICH with intraventricular extension,    Interval history: extubated, follows some commands, refuses to open eyes, shuts them forcefully when I try to open them    ROS neurology: Not speaking/aphasic    MEDICATIONS  (STANDING):  chlorhexidine 2% Cloths 1 Application(s) Topical daily  pantoprazole  Injectable 40 milliGRAM(s) IV Push daily  sodium chloride 0.9%. 1000 milliLiter(s) (75 mL/Hr) IV Continuous <Continuous>    MEDICATIONS  (PRN):      Vital Signs Last 24 Hrs  T(C): 37.6 (29 Sep 2019 12:00), Max: 38.1 (29 Sep 2019 00:00)  T(F): 99.7 (29 Sep 2019 12:00), Max: 100.6 (29 Sep 2019 00:00)  HR: 63 (29 Sep 2019 13:00) (52 - 91)  BP: 129/63 (29 Sep 2019 13:00) (127/63 - 151/65)  BP(mean): 90 (29 Sep 2019 13:00) (81 - 120)  RR: 23 (29 Sep 2019 13:00) (16 - 34)  SpO2: 88% (29 Sep 2019 13:00) (88% - 98%)      General: NAD    Detailed Neurologic Exam:    Mental status: The patient is awake, refuses to open eyes, follows simple commands, not speaking    Cranial nerves: Pupils - right reacts to light, unable to open left eyelid. Unable to assess visual field deficit to threat. Unable to assess EOM. There is no ptosis. Facial sensation is unable to be assessed. Facial musculature is asymmetric with central weakness on right . Palate elevation. Shoulder shrug, and tongue extension can not be assessed    Motor: There is normal bulk and tone.  There is no tremor.  Strength is 2-3/5 in the right arm and 3/5 leg.   Strength is 5/5 in the left arm and leg.    Sensation: Intact to pinch in 4 extremities    Reflexes: 1+ throughout and plantar responses are flexor left, extensor right    Cerebellar: Unable to assess dysmetria on finger to nose testing.    Gait : deferred    LABS:                                    11.2   10.17 )-----------( 134      ( 29 Sep 2019 05:13 )             34.5     09-29    139  |  104  |  15.0  ----------------------------<  102  3.9   |  26.0  |  0.80    Ca    8.5<L>      29 Sep 2019 05:13  Phos  2.7     09-29  Mg     1.8     09-29      RADIOLOGY & ADDITIONAL STUDIES (independently reviewed unless otherwise noted):  head CT large left BG ICH extension to thalamus and ventricles, EVD in place, no mass noted.  CTA head: no aneurysm, AVM, LVO or sig stenosis in COW  CTA neck: no sig carotid or vertebral stenosis, no dissection

## 2019-09-29 NOTE — PROGRESS NOTE ADULT - ASSESSMENT
A/p: 85 y/o male with large left BGH, intraventricular extension , EVD placed, draining CSF    Neuro: Cont Q1hr neuro checks, cont evd as per nsg, pain control however hold sedating meds. Delirium precautions    Card: Cont hemodynamic monitoring, keep normotensive    Pulm: Aggressive pulmonary toileting, high risk for pulmonary decompensation, HOB 35-45 degrees, IS if will participate    GI: Place enteral feeding tube to start tube feeds.  Will reassess possibility for swallow eval    : Keep montejo at this time, strict i/o's    Endo: Keep eugylcemic, no coverage needed    ID: None    Hem/DVT: SCDs, hold chemical dvt ppx at this time as per nsg    Skin: Frequent turning while in bed    Dispo: SICU, high risk for neurologic and pulmonary decompensation

## 2019-09-29 NOTE — DIETITIAN INITIAL EVALUATION ADULT. - PERTINENT LABORATORY DATA
09-29 Na139 mmol/L Glu 102 mg/dL K+ 3.9 mmol/L Cr  0.80 mg/dL BUN 15.0 mg/dL Phos 2.7 mg/dL Alb n/a   PAB n/a

## 2019-09-30 LAB
ANION GAP SERPL CALC-SCNC: 11 MMOL/L — SIGNIFICANT CHANGE UP (ref 5–17)
BASOPHILS # BLD AUTO: 0.04 K/UL — SIGNIFICANT CHANGE UP (ref 0–0.2)
BASOPHILS NFR BLD AUTO: 0.5 % — SIGNIFICANT CHANGE UP (ref 0–2)
BUN SERPL-MCNC: 18 MG/DL — SIGNIFICANT CHANGE UP (ref 8–20)
CALCIUM SERPL-MCNC: 8.5 MG/DL — LOW (ref 8.6–10.2)
CHLORIDE SERPL-SCNC: 106 MMOL/L — SIGNIFICANT CHANGE UP (ref 98–107)
CO2 SERPL-SCNC: 25 MMOL/L — SIGNIFICANT CHANGE UP (ref 22–29)
CREAT SERPL-MCNC: 0.78 MG/DL — SIGNIFICANT CHANGE UP (ref 0.5–1.3)
EOSINOPHIL # BLD AUTO: 0.08 K/UL — SIGNIFICANT CHANGE UP (ref 0–0.5)
EOSINOPHIL NFR BLD AUTO: 1.1 % — SIGNIFICANT CHANGE UP (ref 0–6)
GLUCOSE BLDC GLUCOMTR-MCNC: 105 MG/DL — HIGH (ref 70–99)
GLUCOSE BLDC GLUCOMTR-MCNC: 109 MG/DL — HIGH (ref 70–99)
GLUCOSE BLDC GLUCOMTR-MCNC: 121 MG/DL — HIGH (ref 70–99)
GLUCOSE SERPL-MCNC: 124 MG/DL — HIGH (ref 70–115)
HCT VFR BLD CALC: 36.2 % — LOW (ref 39–50)
HGB BLD-MCNC: 11.8 G/DL — LOW (ref 13–17)
IMM GRANULOCYTES NFR BLD AUTO: 0.4 % — SIGNIFICANT CHANGE UP (ref 0–1.5)
LYMPHOCYTES # BLD AUTO: 1.11 K/UL — SIGNIFICANT CHANGE UP (ref 1–3.3)
LYMPHOCYTES # BLD AUTO: 15.2 % — SIGNIFICANT CHANGE UP (ref 13–44)
MAGNESIUM SERPL-MCNC: 2.2 MG/DL — SIGNIFICANT CHANGE UP (ref 1.6–2.6)
MCHC RBC-ENTMCNC: 31.3 PG — SIGNIFICANT CHANGE UP (ref 27–34)
MCHC RBC-ENTMCNC: 32.6 GM/DL — SIGNIFICANT CHANGE UP (ref 32–36)
MCV RBC AUTO: 96 FL — SIGNIFICANT CHANGE UP (ref 80–100)
MONOCYTES # BLD AUTO: 1.24 K/UL — HIGH (ref 0–0.9)
MONOCYTES NFR BLD AUTO: 17 % — HIGH (ref 2–14)
NEUTROPHILS # BLD AUTO: 4.81 K/UL — SIGNIFICANT CHANGE UP (ref 1.8–7.4)
NEUTROPHILS NFR BLD AUTO: 65.8 % — SIGNIFICANT CHANGE UP (ref 43–77)
OSMOLALITY SERPL: 305 MOSMOL/KG — HIGH (ref 280–301)
PHOSPHATE SERPL-MCNC: 1.6 MG/DL — LOW (ref 2.4–4.7)
PHOSPHATE SERPL-MCNC: 2.6 MG/DL — SIGNIFICANT CHANGE UP (ref 2.4–4.7)
PLATELET # BLD AUTO: 128 K/UL — LOW (ref 150–400)
POTASSIUM SERPL-MCNC: 4 MMOL/L — SIGNIFICANT CHANGE UP (ref 3.5–5.3)
POTASSIUM SERPL-SCNC: 4 MMOL/L — SIGNIFICANT CHANGE UP (ref 3.5–5.3)
PROCALCITONIN SERPL-MCNC: 0.08 NG/ML — SIGNIFICANT CHANGE UP (ref 0.02–0.1)
RBC # BLD: 3.77 M/UL — LOW (ref 4.2–5.8)
RBC # FLD: 13.9 % — SIGNIFICANT CHANGE UP (ref 10.3–14.5)
SODIUM SERPL-SCNC: 142 MMOL/L — SIGNIFICANT CHANGE UP (ref 135–145)
WBC # BLD: 7.31 K/UL — SIGNIFICANT CHANGE UP (ref 3.8–10.5)
WBC # FLD AUTO: 7.31 K/UL — SIGNIFICANT CHANGE UP (ref 3.8–10.5)

## 2019-09-30 PROCEDURE — 99233 SBSQ HOSP IP/OBS HIGH 50: CPT

## 2019-09-30 PROCEDURE — 93970 EXTREMITY STUDY: CPT | Mod: 26

## 2019-09-30 PROCEDURE — 99223 1ST HOSP IP/OBS HIGH 75: CPT | Mod: GC

## 2019-09-30 PROCEDURE — 71045 X-RAY EXAM CHEST 1 VIEW: CPT | Mod: 26

## 2019-09-30 PROCEDURE — 99291 CRITICAL CARE FIRST HOUR: CPT

## 2019-09-30 PROCEDURE — 99232 SBSQ HOSP IP/OBS MODERATE 35: CPT

## 2019-09-30 PROCEDURE — 70450 CT HEAD/BRAIN W/O DYE: CPT | Mod: 26

## 2019-09-30 RX ORDER — METOPROLOL TARTRATE 50 MG
5 TABLET ORAL EVERY 6 HOURS
Refills: 0 | Status: DISCONTINUED | OUTPATIENT
Start: 2019-09-30 | End: 2019-10-01

## 2019-09-30 RX ORDER — HYDRALAZINE HCL 50 MG
10 TABLET ORAL ONCE
Refills: 0 | Status: COMPLETED | OUTPATIENT
Start: 2019-09-30 | End: 2019-09-30

## 2019-09-30 RX ORDER — ENOXAPARIN SODIUM 100 MG/ML
40 INJECTION SUBCUTANEOUS DAILY
Refills: 0 | Status: DISCONTINUED | OUTPATIENT
Start: 2019-09-30 | End: 2019-10-09

## 2019-09-30 RX ORDER — HYDRALAZINE HCL 50 MG
5 TABLET ORAL ONCE
Refills: 0 | Status: COMPLETED | OUTPATIENT
Start: 2019-09-30 | End: 2019-09-30

## 2019-09-30 RX ADMIN — Medication 650 MILLIGRAM(S): at 21:35

## 2019-09-30 RX ADMIN — Medication 650 MILLIGRAM(S): at 06:35

## 2019-09-30 RX ADMIN — ALBUTEROL 2.5 MILLIGRAM(S): 90 AEROSOL, METERED ORAL at 02:14

## 2019-09-30 RX ADMIN — Medication 85 MILLIMOLE(S): at 09:13

## 2019-09-30 RX ADMIN — Medication 650 MILLIGRAM(S): at 15:44

## 2019-09-30 RX ADMIN — PANTOPRAZOLE SODIUM 40 MILLIGRAM(S): 20 TABLET, DELAYED RELEASE ORAL at 12:20

## 2019-09-30 RX ADMIN — Medication 62.5 MILLIMOLE(S): at 20:32

## 2019-09-30 RX ADMIN — Medication 5 MILLIGRAM(S): at 17:23

## 2019-09-30 RX ADMIN — SODIUM CHLORIDE 75 MILLILITER(S): 9 INJECTION INTRAMUSCULAR; INTRAVENOUS; SUBCUTANEOUS at 17:23

## 2019-09-30 RX ADMIN — Medication 5 MILLIGRAM(S): at 23:39

## 2019-09-30 RX ADMIN — Medication 5 MILLIGRAM(S): at 12:21

## 2019-09-30 RX ADMIN — SODIUM CHLORIDE 75 MILLILITER(S): 9 INJECTION INTRAMUSCULAR; INTRAVENOUS; SUBCUTANEOUS at 03:30

## 2019-09-30 RX ADMIN — Medication 5 MILLIGRAM(S): at 14:58

## 2019-09-30 RX ADMIN — ENOXAPARIN SODIUM 40 MILLIGRAM(S): 100 INJECTION SUBCUTANEOUS at 20:42

## 2019-09-30 RX ADMIN — Medication 10 MILLIGRAM(S): at 15:43

## 2019-09-30 RX ADMIN — Medication 650 MILLIGRAM(S): at 14:40

## 2019-09-30 RX ADMIN — ALBUTEROL 2.5 MILLIGRAM(S): 90 AEROSOL, METERED ORAL at 21:47

## 2019-09-30 RX ADMIN — CHLORHEXIDINE GLUCONATE 1 APPLICATION(S): 213 SOLUTION TOPICAL at 12:21

## 2019-09-30 RX ADMIN — Medication 650 MILLIGRAM(S): at 20:33

## 2019-09-30 RX ADMIN — Medication 650 MILLIGRAM(S): at 05:00

## 2019-09-30 NOTE — OCCUPATIONAL THERAPY INITIAL EVALUATION ADULT - EATING, PREVIOUS LEVEL OF FUNCTION, OT EVAL
Requested Prescriptions   Pending Prescriptions Disp Refills     furosemide (LASIX) 40 MG tablet [Pharmacy Med Name: FUROSEMIDE 40MG TABS]   90 tablet 1    Last Written Prescription Date:  7.3.17  Last Fill Quantity: 90tablet,  # refills: 1   Last Office Visit with [facility], [facility] or [facility] prescribing provider:  10.31.17   Future Office Visit:      Sig: TAKE ONE TABLET BY MOUTH DAILY AT 10AM    Diuretics (Including Combos) Protocol Passed    12/30/2017  7:17 PM       Passed - Blood pressure under 140/90    BP Readings from Last 3 Encounters:   11/16/17 134/58   10/31/17 131/81   10/19/17 156/63                Passed - Recent or future visit with authorizing provider's specialty    Patient had office visit in the last year or has a visit in the next 30 days with authorizing provider.  See chart review.              Passed - Patient is age 18 or older       Passed - No active pregancy on record       Passed - Normal serum creatinine on file in past 12 months    Recent Labs   Lab Test  10/19/17   0841   CR  0.84             Passed - Normal serum potassium on file in past 12 months    Recent Labs   Lab Test  10/19/17   0841   POTASSIUM  3.7                   Passed - Normal serum sodium on file in past 12 months    Recent Labs   Lab Test  10/19/17   0841   NA  139             Passed - No positive pregnancy test in past 12 months          
independent

## 2019-09-30 NOTE — PROGRESS NOTE ADULT - SUBJECTIVE AND OBJECTIVE BOX
HPI:    86 y/o male with a hx of HTN and hypothyroid who presented as a transfer from [place] with a left basal ganglia hemorrhage with IVH. Patient is s/p EVD placement on 9/27.     Interval HPI: Patient examined at bedside today. Laying down in bed. + secretions with frequent suctioning. EVD drain in place. ICP 3-11 overnight. Drain output 84 (24 hr), 34 (Overnight). Patient extubated yesterday.       Vital Signs Last 24 Hrs  T(C): 38.1 (30 Sep 2019 12:00), Max: 38.8 (29 Sep 2019 18:00)  T(F): 100.6 (30 Sep 2019 12:00), Max: 101.8 (29 Sep 2019 18:00)  HR: 66 (30 Sep 2019 14:00) (58 - 84)  BP: 191/91 (30 Sep 2019 14:00) (82/55 - 191/91)  BP(mean): 130 (30 Sep 2019 14:00) (64 - 130)  RR: 29 (30 Sep 2019 14:00) (18 - 41)  SpO2: 95% (30 Sep 2019 14:00) (90% - 100%)    PHYSICAL EXAM:  GENERAL: NAD,   HEAD:  Atraumatic, normocephalic  DRAINS: EVD @ 10  WOUND: Dressing clean dry intact  ELISABETH COMA SCORE: GCS 11        E:  3= to voice        V:  2= incomprehensible sounds        M: 6= follows commands   MENTAL STATUS: Awake,  following simple commands   CRANIAL NERVES: PERRL.    MOTOR: Right LE 4/5. Left LE 5/5 , pt moving B/L UE   SENSATION: grossly intact to light touch all extremities  CHEST/LUNG: + course rhonci , no labored  breathing     LABS:                        11.8   7.31  )-----------( 128      ( 30 Sep 2019 05:10 )             36.2     09-30    142  |  106  |  18.0  ----------------------------<  124<H>  4.0   |  25.0  |  0.78    Ca    8.5<L>      30 Sep 2019 05:10  Phos  1.6     09-30  Mg     2.2     09-30 09-29 @ 07:01  -  09-30 @ 07:00  --------------------------------------------------------  IN: 2420 mL / OUT: 2274 mL / NET: 146 mL    09-30 @ 07:01  -  09-30 @ 14:49  --------------------------------------------------------  IN: 1200 mL / OUT: 636 mL / NET: 564 mL        RADIOLOGY & ADDITIONAL TESTS:    CT Head No Cont (09.30.19 @ 13:57) >  IMPRESSION: Stable left basal ganglia hemorrhage. Improved   intraventricular hemorrhage. New trace bilateral paramedian frontal   parietal lobe areas of subarachnoid hemorrhage. Stable ventricular size.

## 2019-09-30 NOTE — OCCUPATIONAL THERAPY INITIAL EVALUATION ADULT - RANGE OF MOTION EXAMINATION
pt with difficulty following directions; observed to have left UE 1/2 shoulder flexion, 3/4 elbow flexion, full wrist and digits

## 2019-09-30 NOTE — PHYSICAL THERAPY INITIAL EVALUATION ADULT - PERTINENT HX OF CURRENT PROBLEM, REHAB EVAL
he was not able to talk, rolled his legs off the bed, his body followed. His wife put a pillow under his head, he began to foam at the mouth

## 2019-09-30 NOTE — PROGRESS NOTE ADULT - SUBJECTIVE AND OBJECTIVE BOX
85 yo M with PMH of HTN, presents as [place] [facility] with L BG ICH with IVH.   Last seen normal was 10 pm last night, wife found him confused, agitated. Possible seizure.  Received Keppra and mannitol.  Of note, takes ASA 81 daily, no other AC/antiplts.  On admission, the patient was:  GCS: 11t ([id number]).  ICH score: 3  EVD placed, at 10.    O/n: none reported.  Was extubated over the weekend.   Remains lethargic. + oral secretions.     LABS: reviewed.  IMAGING: Recent imaging studies were reviewed.  MEDICATIONS: reviewed.    EXAMINATION: pt is intubated, off sedation.  General:  NAD, cooperative.  HEENT:  does not open eyes, when opened - tries to track, PERRL.  Neuro:  lethargic but awakens briefly by verbal, follows commands, moves all extremities: LUE 5/5, LLE 5/5, RUE 3/5 + drift, RLE 3/5.  Cards:  S1S2 present.  Respiratory:  no respiratory distress, although gurgling sound noted, no cough seen on exam.  Abdomen:  soft  Skin:  warm/dry

## 2019-09-30 NOTE — PHYSICAL THERAPY INITIAL EVALUATION ADULT - IMPAIRMENTS FOUND, PT EVAL
neuromotor development and sensory integration/aerobic capacity/endurance/gait, locomotion, and balance/muscle strength

## 2019-09-30 NOTE — OCCUPATIONAL THERAPY INITIAL EVALUATION ADULT - PLANNED THERAPY INTERVENTIONS, OT EVAL
motor coordination training/transfer training/cognitive, visual perceptual/ADL retraining/bed mobility training/balance training/neuromuscular re-education/ROM/strengthening

## 2019-09-30 NOTE — PHYSICAL THERAPY INITIAL EVALUATION ADULT - TRANSFER SAFETY CONCERNS NOTED: SIT/STAND, REHAB EVAL
in sagittal plane/decreased safety awareness/decreased sequencing ability/decreased weight-shifting ability/decreased balance during turns

## 2019-09-30 NOTE — PROGRESS NOTE ADULT - ASSESSMENT
85 y/o male with large left BGH, intraventricular extension , EVD placed, draining CSF    Neuro: Cont Q1hr neuro checks, cont evd as per nsg, pain control however hold sedating meds. Delirium precautions    Card: Continue lopressor for BP control.  Maintain systolic <150  Cont hemodynamic monitoring, keep normotensive    Pulm: Aggressive pulmonary toileting, high risk for pulmonary decompensation, HOB 35-45 degrees, IS if will participate    GI: Continue tube feeds at goal.      : Keep montejo at this time, strict i/o's    Endo: Keep euglycemic no coverage needed    ID:  Remains with low grade fever without leukocytosis.  Chest XR with retrocardiac infiltrate.  Monitor fever curve and consider blood culture, UA, procalcitonin    Hem/DVT: SCDs, hold chemical dvt ppx at this time as per nsg    Skin: Frequent turning while in bed    Dispo: SICU, high risk for neurologic and pulmonary decompensation

## 2019-09-30 NOTE — PHYSICAL THERAPY INITIAL EVALUATION ADULT - PRECAUTIONS/LIMITATIONS, REHAB EVAL
fall precautions/seizure precautions/surgical precautions/aspiration precautions/cardiac precautions

## 2019-09-30 NOTE — PROGRESS NOTE ADULT - SUBJECTIVE AND OBJECTIVE BOX
[facility]                                        Neurology at [place]                                 Frieda Saunders & Jose                                  [address]                                        [place], [place], [address]                                             [phone]        CC: intracranial hemorrhage     HPI:   The patient is a 84y Male who presented as a transfer to [facility] for management of ICH.  He apparently may have had seizure and right sided weakness and was brought to [facility].  The patient was transferred to [facility] for intracranial hemorrhage management and neurosurgical eval.  He was intubated for transfer.  An extraventricular drain was placed. (27 Sep 2019 17:30).    Interim history:  Remains in SICU.   Extubated.   The extraventricular drain is in place.     ROS:   Unobtainable due to patient's condition.     MEDICATIONS  (STANDING):  ALBUTerol    0.083% 2.5 milliGRAM(s) Nebulizer every 6 hours  chlorhexidine 2% Cloths 1 Application(s) Topical daily  metoprolol tartrate Injectable 5 milliGRAM(s) IV Push every 6 hours  pantoprazole  Injectable 40 milliGRAM(s) IV Push daily  sodium chloride 0.9%. 1000 milliLiter(s) (75 mL/Hr) IV Continuous <Continuous>      Vital Signs Last 24 Hrs  T(C): 37.5 (30 Sep 2019 07:00), Max: 38.8 (29 Sep 2019 18:00)  T(F): 99.5 (30 Sep 2019 07:00), Max: 101.8 (29 Sep 2019 18:00)  HR: 65 (30 Sep 2019 10:00) (56 - 84)  BP: 155/67 (30 Sep 2019 10:00) (82/55 - 158/68)  BP(mean): 96 (30 Sep 2019 10:00) (64 - 99)  RR: 20 (30 Sep 2019 10:00) (18 - 41)  SpO2: 98% (30 Sep 2019 10:00) (88% - 100%)    Detailed Neurologic Exam:    Mental status: The patient is awake but non verbal. He attempts eye opening briefly. Followed instructions this morning.     Cranial nerves: Pupils equal and react symmetrically to light. There is no visual field deficit to threat. Extraocular motion is difficult to assess as he does not maintain eye opening for long. Facial musculature is asymmetric with a depression of the right nasolabial fold. Palate and tongue are difficult to evaluate.     Motor: There is normal bulk and tone.  There is no tremor.  Moving right minimally (2-3/5) to stimuli.  Moving left 5/5 to stimuli.     Sensation: Grimaces to pinch bilaterally but more so on left.     Reflexes: 1+ throughout and plantar responses are flexor left, extensor right.    Cerebellar: Cannot be assessed.     Labs:     09-30    142  |  106  |  18.0  ----------------------------<  124<H>  4.0   |  25.0  |  0.78    Ca    8.5<L>      30 Sep 2019 05:10  Phos  1.6     09-30  Mg     2.2     09-30                              11.8   7.31  )-----------( 128      ( 30 Sep 2019 05:10 )             36.2

## 2019-09-30 NOTE — PHYSICAL THERAPY INITIAL EVALUATION ADULT - ADDITIONAL COMMENTS
as per wife: pt. lives in the private house with 3 steps to enter, (+) rail,  owns a quad cane, but does not use, wife available to assist as needed

## 2019-09-30 NOTE — CHART NOTE - NSCHARTNOTEFT_GEN_A_CORE
Pt EVD output increased s/p chest xray.  Output increse due to bed repositioning during xray.  PE unchanged, purposeful movement, non-verbal.  Per NS Team, clamp EVD 1 hr, reassess.  NS Team aware of event and following.

## 2019-09-30 NOTE — CONSULT NOTE ADULT - ATTENDING COMMENTS
Patient seen and examined and cased discussed with resident. Agree with recommendations.    At this time, patient with AMS and limited ability to participate. Medically/surgically being optimized. As becomes more awake/alert, suspect aphasia will affect his behavior and become more restless. In anticipation, consider adding Melatonin 3mg at night and Trazodone 25mg at night.     Will continue to follow. Functional progress will determine ongoing rehab dispo recommendations, which may change.    Continue bedside therapy as well as OOB throughout the day with mobilization by staff to maintain cardiopulmonary function and prevention of secondary complications related to debility.
NSGY Attg:    see above    patient seen    agree with exam as above    agree with plan as above

## 2019-09-30 NOTE — CONSULT NOTE ADULT - SUBJECTIVE AND OBJECTIVE BOX
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 Per chart review, wife states that on 9/27 in the morning,  woke up in his usual state of health, walked around but came back to bed. When he came into bed, his legs stiffened, he was not able to talk, rolled his legs off the bed, his body followed. His wife put a pillow under his head, he began to foam at the mouth.  No loss of urine or tongue biting. Wife called ambulance, he was taken to , intubated for airway protection. Pt received Keppra and mannitol.  EVD placed on admission. Extubated 9/28.       Imaging showed:  CT HEAD:  Acute left basal ganglia hemorrhage on image 35, series 4 measures 1.9 x   1.4 cm. There is dissection of hemorrhage into the left thalamus on left   lateral ventricle. Large amount of hemorrhage in the left lateral   ventricle. Mild to moderate amount of hemorrhage in the right lateral   ventricle, third ventricle, cerebral aqueduct, and fourth ventricle.   Moderate microvascular changes. 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 The patient is intubated.    CTA BRAIN: Fetal origin to the right posterior cerebral artery.  The [place] of [place] and vertebrobasilar system are unremarkable without   evidence of stenosis, occlusion or saccular aneurysm dilation. No   evidence for arterial venous malformation. The vertebral arteries are   codominant.    CTA NECK: Mild calcific and soft plaque in the left carotid bulbwithout   significant stenosis. Mild calcific plaque in the right carotid bulb.  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 There is normal relationship to   the great vessels. The common carotid arteries, internal carotid arteries   and vertebral arteries shows no evidence of significant stenosis,   occlusion or saccular aneurysm dilation. The vertebral arteries are   codominant.      IMPRESSION:      Acute left basal ganglia hemorrhage with dissection of hemorrhage into   the left thalamus and ventricles. No evidence of aneurysm or   arteriovenous malformation.        REVIEW OF SYSTEMS  Constitutional - No fever, No weight loss, No fatigue  HEENT - No eye pain, No visual disturbances, No difficulty hearing, No tinnitus, No vertigo, No neck pain  Respiratory - No cough, No wheezing, No shortness of breath  Cardiovascular - No chest pain, No palpitations  Gastrointestinal - No abdominal pain, No nausea, No vomiting, No diarrhea, No constipation  Genitourinary - No dysuria, No frequency, No hematuria, No incontinence  Neurological - No headaches, No memory loss, No loss of strength, No numbness, No tremors  Skin - No itching, No rashes, No lesions   Endocrine - No temperature intolerance  Musculoskeletal - No joint pain, No joint swelling, No muscle pain  Psychiatric - No depression, No anxiety    VITALS  T(C): 37.5 (09-30-19 @ 07:00), Max: 38.8 (09-29-19 @ 18:00)  HR: 59 (09-30-19 @ 07:00) (55 - 84)  BP: 114/56 (09-30-19 @ 07:00) (82/55 - 158/68)  RR: 18 (09-30-19 @ 07:00) (18 - 41)  SpO2: 94% (09-30-19 @ 07:00) (88% - 96%)  Wt(kg): --    PAST MEDICAL & SURGICAL HISTORY  Hypothyroid  HTN (hypertension)      SOCIAL HISTORY  Smoking - Denied  EtOH - Denied   Drugs - Denied    FUNCTIONAL HISTORY  Lives   Independent    CURRENT FUNCTIONAL STATUS  pending PT/OT eval    FAMILY HISTORY       RECENT LABS/IMAGING  CBC Full  -  ( 30 Sep 2019 05:10 )  WBC Count : 7.31 K/uL  RBC Count : 3.77 M/uL  Hemoglobin : 11.8 g/dL  Hematocrit : 36.2 %  Platelet Count - Automated : 128 K/uL  Mean Cell Volume : 96.0 fl  Mean Cell Hemoglobin : 31.3 pg  Mean Cell Hemoglobin Concentration : 32.6 gm/dL  Auto Neutrophil # : 4.81 K/uL  Auto Lymphocyte # : 1.11 K/uL  Auto Monocyte # : 1.24 K/uL  Auto Eosinophil # : 0.08 K/uL  Auto Basophil # : 0.04 K/uL  Auto Neutrophil % : 65.8 %  Auto Lymphocyte % : 15.2 %  Auto Monocyte % : 17.0 %  Auto Eosinophil % : 1.1 %  Auto Basophil % : 0.5 %    09-30    142  |  106  |  18.0  ----------------------------<  124<H>  4.0   |  25.0  |  0.78    Ca    8.5<L>      30 Sep 2019 05:10  Phos  1.6     09-30  Mg     2.2     09-30          ALLERGIES  No Known Allergies      MEDICATIONS   acetaminophen    Suspension .. 650 milliGRAM(s) Oral every 6 hours PRN  acetaminophen  Suppository .. 650 milliGRAM(s) Rectal every 6 hours PRN  ALBUTerol    0.083% 2.5 milliGRAM(s) Nebulizer every 6 hours  chlorhexidine 2% Cloths 1 Application(s) Topical daily  metoprolol tartrate Injectable 5 milliGRAM(s) IV Push every 6 hours  pantoprazole  Injectable 40 milliGRAM(s) IV Push daily  sodium chloride 0.9%. 1000 milliLiter(s) IV Continuous <Continuous>  sodium phosphate IVPB 30 milliMole(s) IV Intermittent once      ----------------------------------------------------------------------------------------  PHYSICAL EXAM  Constitutional - NAD, Comfortable  HEENT - NCAT, EOMI  Neck - Supple, No limited ROM  Chest - Breathing comfortably, No wheezing  Cardiovascular - S1S2   Abdomen - Soft   Extremities - No C/C/E, No calf tenderness   Neurologic Exam -                    Cognitive - Awake, Alert, AAO to self, place, date, year, situation     Communication - Fluent, No dysarthria     Cranial Nerves - CN 2-12 intact     Motor -                     LEFT    UE - ShAB 5/5, EF 5/5, EE 5/5, WE 5/5,  5/5                    RIGHT UE - ShAB 5/5, EF 5/5, EE 5/5, WE 5/5,  5/5                    LEFT    LE - HF 5/5, KE 5/5, DF 5/5, PF 5/5                    RIGHT LE - HF 5/5, KE 5/5, DF 5/5, PF 5/5        Sensory - Intact to LT     Reflexes - DTR Intact, No primitive reflexive     Coordination - FTN intact     OculoVestibular - No saccades, No nystagmus, VOR         Balance - WNL Static  Psychiatric - Mood stable, Affect WNL  ----------------------------------------------------------------------------------------  ASSESSMENT/PLAN  84y Male with functional deficits after  Pain -   DVT PPX - 84yM was admitted on 09-27    In ED, GCS=11    Patient is a 84y old  Male who presents with a chief complaint of ICH (30 Sep 2019 05:48)    HPI:  84M with PMH of HTN and hypothyroidism [place] [facility] [place] to [facility] for 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 Per chart review, wife states that on 9/27 in the morning,  woke up in his usual state of health, walked around but came back to bed. When he came into bed, his legs stiffened, he was not able to talk, rolled his legs off the bed, his body followed. His wife put a pillow under his head, he began to foam at the mouth.  No loss of urine or tongue biting. Wife called ambulance, he was taken to [facility], intubated for airway protection. Pt received Keppra and mannitol.  EVD placed on admission. Extubated 9/28.       Imaging showed:  CT HEAD:  Acute left basal ganglia hemorrhage on image 35, series 4 measures 1.9 x 1.4 cm. There is dissection of hemorrhage into the left thalamus on left lateral ventricle. Large amount of hemorrhage in the left lateral ventricle. Mild to moderate amount of hemorrhage in the right lateral   ventricle, third ventricle, cerebral aqueduct, and fourth ventricle. Moderate microvascular changes. Left of midline posterior fossa arachnoid cyst measures 3.2 x 2.9 x 4.8 cm. The patient is intubated.    CTA BRAIN: Fetal origin to the right posterior cerebral artery. The [place] of [place] and vertebrobasilar system are unremarkable without evidence of stenosis, occlusion or saccular aneurysm dilation. No evidence for arterial venous malformation. The vertebral arteries are codominant.    CTA NECK: Mild calcific and soft plaque in the left carotid bulb without significant stenosis. Mild calcific plaque in the right carotid bulb. 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 There is normal relationship to the great vessels. The common carotid arteries, internal carotid arteries   and vertebral arteries shows no evidence of significant stenosis, occlusion or saccular aneurysm dilation. The vertebral arteries are codominant.      REVIEW OF SYSTEMS  Unable to assess    VITALS  T(C): 37.5 (09-30-19 @ 07:00), Max: 38.8 (09-29-19 @ 18:00)  HR: 59 (09-30-19 @ 07:00) (55 - 84)  BP: 114/56 (09-30-19 @ 07:00) (82/55 - 158/68)  RR: 18 (09-30-19 @ 07:00) (18 - 41)  SpO2: 94% (09-30-19 @ 07:00) (88% - 96%)  Wt(kg): --    PAST MEDICAL & SURGICAL HISTORY  Hypothyroid  HTN (hypertension)      SOCIAL HISTORY  Unable to assess    FUNCTIONAL HISTORY  Unable to assess    CURRENT FUNCTIONAL STATUS  pending PT/OT eval    FAMILY HISTORY       RECENT LABS/IMAGING  CBC Full  -  ( 30 Sep 2019 05:10 )  WBC Count : 7.31 K/uL  RBC Count : 3.77 M/uL  Hemoglobin : 11.8 g/dL  Hematocrit : 36.2 %  Platelet Count - Automated : 128 K/uL  Mean Cell Volume : 96.0 fl  Mean Cell Hemoglobin : 31.3 pg  Mean Cell Hemoglobin Concentration : 32.6 gm/dL  Auto Neutrophil # : 4.81 K/uL  Auto Lymphocyte # : 1.11 K/uL  Auto Monocyte # : 1.24 K/uL  Auto Eosinophil # : 0.08 K/uL  Auto Basophil # : 0.04 K/uL  Auto Neutrophil % : 65.8 %  Auto Lymphocyte % : 15.2 %  Auto Monocyte % : 17.0 %  Auto Eosinophil % : 1.1 %  Auto Basophil % : 0.5 %    09-30    142  |  106  |  18.0  ----------------------------<  124<H>  4.0   |  25.0  |  0.78    Ca    8.5<L>      30 Sep 2019 05:10  Phos  1.6     09-30  Mg     2.2     09-30          ALLERGIES  No Known Allergies      MEDICATIONS   acetaminophen    Suspension .. 650 milliGRAM(s) Oral every 6 hours PRN  acetaminophen  Suppository .. 650 milliGRAM(s) Rectal every 6 hours PRN  ALBUTerol    0.083% 2.5 milliGRAM(s) Nebulizer every 6 hours  chlorhexidine 2% Cloths 1 Application(s) Topical daily  metoprolol tartrate Injectable 5 milliGRAM(s) IV Push every 6 hours  pantoprazole  Injectable 40 milliGRAM(s) IV Push daily  sodium chloride 0.9%. 1000 milliLiter(s) IV Continuous <Continuous>  sodium phosphate IVPB 30 milliMole(s) IV Intermittent once      ----------------------------------------------------------------------------------------    PHYSICAL EXAM  Constitutional - NAD, Comfortable  HEENT - +EVD, EOMI, +NGT, PERRL  Neck - Supple, No limited ROM  Chest - +coarse upper airway sounds, no wheezing  Cardiovascular - S1S2   Abdomen - Soft, NTND  Extremities - No C/C/E, No calf tenderness   Neurologic Exam -                    Cognitive - eyes closed, arousable, localizes to pain, inconsistent in following commands, semi-purposeful movements     Communication - +expressive aphasia     Cranial Nerves - unable to assess     Motor -                     LEFT    UE - ShAB 4/5, EF 4-/5, EE 4/5, WE 3/5,  5/5                    RIGHT UE - ShAB 0/5, EF 2/5, EE 2/5, WE 0/5,  0/5                    LEFT    LE - HF 2/5, KE 2/5, DF 4/5, PF 4/5                    RIGHT LE - HF 0/5, KE 0/5, DF 4/5, PF 4/5        Sensory - Intact to LT     Reflexes - DTR Intact, No primitive reflexive     Coordination - unable to assess     OculoVestibular - unable to assess     Balance - unable to assess    ----------------------------------------------------------------------------------------  ASSESSMENT/PLAN  84y Male with functional deficits after L basal ganglia hemorrhage    L basal ganglia hemorrhage - +EVD,. Cont neurochecks, frequent turns  HTN - lopressor  Pulm - extubated, cont oral suctioning, albuterol  GI - NGT for feeds, protonix  DVT PPX - SCDs only  Rehab - Pt currently intermittently opens eyes and follows commands inconsistently, currently nonverbal. Pending PT/OT eval. Will need continued follow up.    Will continue to follow for ongoing rehab needs and recommendations. 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 Per chart review, wife states that on 9/27 in the morning,  woke up in his usual state of health, walked around but came back to bed. When he came into bed, his legs stiffened, he was not able to talk, rolled his legs off the bed, his body followed. His wife put a pillow under his head, he began to foam at the mouth.  No loss of urine or tongue biting. Wife called ambulance, he was taken to [facility], intubated for airway protection. Pt received Keppra and mannitol.  EVD placed on admission. Extubated 9/28.       Imaging showed:  CT HEAD:  Acute left basal ganglia hemorrhage on image 35, series 4 measures 1.9 x 1.4 cm. There is dissection of hemorrhage into the left thalamus on left lateral ventricle. Large amount of hemorrhage in the left lateral ventricle. Mild to moderate amount of hemorrhage in the right lateral   ventricle, third ventricle, cerebral aqueduct, and fourth ventricle. Moderate microvascular changes. Left of midline posterior fossa arachnoid cyst measures 3.2 x 2.9 x 4.8 cm. The patient is intubated.    CTA BRAIN: Fetal origin to the right posterior cerebral artery. The [place] of [place] and vertebrobasilar system are unremarkable without evidence of stenosis, occlusion or saccular aneurysm dilation. No evidence for arterial venous malformation. The vertebral arteries are codominant.    CTA NECK: Mild calcific and soft plaque in the left carotid bulb without significant stenosis. Mild calcific plaque in the right carotid bulb. 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 There is normal relationship to the great vessels. The common carotid arteries, internal carotid arteries   and vertebral arteries shows no evidence of significant stenosis, occlusion or saccular aneurysm dilation. The vertebral arteries are codominant.      REVIEW OF SYSTEMS  Unable to assess    VITALS  T(C): 37.5 (09-30-19 @ 07:00), Max: 38.8 (09-29-19 @ 18:00)  HR: 59 (09-30-19 @ 07:00) (55 - 84)  BP: 114/56 (09-30-19 @ 07:00) (82/55 - 158/68)  RR: 18 (09-30-19 @ 07:00) (18 - 41)  SpO2: 94% (09-30-19 @ 07:00) (88% - 96%)  Wt(kg): --    PAST MEDICAL & SURGICAL HISTORY  Hypothyroid  HTN (hypertension)      SOCIAL HISTORY  Unable to assess    FUNCTIONAL HISTORY  Unable to assess    CURRENT FUNCTIONAL STATUS  pending PT/OT eval    FAMILY HISTORY       RECENT LABS/IMAGING  CBC Full  -  ( 30 Sep 2019 05:10 )  WBC Count : 7.31 K/uL  RBC Count : 3.77 M/uL  Hemoglobin : 11.8 g/dL  Hematocrit : 36.2 %  Platelet Count - Automated : 128 K/uL  Mean Cell Volume : 96.0 fl  Mean Cell Hemoglobin : 31.3 pg  Mean Cell Hemoglobin Concentration : 32.6 gm/dL  Auto Neutrophil # : 4.81 K/uL  Auto Lymphocyte # : 1.11 K/uL  Auto Monocyte # : 1.24 K/uL  Auto Eosinophil # : 0.08 K/uL  Auto Basophil # : 0.04 K/uL  Auto Neutrophil % : 65.8 %  Auto Lymphocyte % : 15.2 %  Auto Monocyte % : 17.0 %  Auto Eosinophil % : 1.1 %  Auto Basophil % : 0.5 %    09-30    142  |  106  |  18.0  ----------------------------<  124<H>  4.0   |  25.0  |  0.78    Ca    8.5<L>      30 Sep 2019 05:10  Phos  1.6     09-30  Mg     2.2     09-30          ALLERGIES  No Known Allergies      MEDICATIONS   acetaminophen    Suspension .. 650 milliGRAM(s) Oral every 6 hours PRN  acetaminophen  Suppository .. 650 milliGRAM(s) Rectal every 6 hours PRN  ALBUTerol    0.083% 2.5 milliGRAM(s) Nebulizer every 6 hours  chlorhexidine 2% Cloths 1 Application(s) Topical daily  metoprolol tartrate Injectable 5 milliGRAM(s) IV Push every 6 hours  pantoprazole  Injectable 40 milliGRAM(s) IV Push daily  sodium chloride 0.9%. 1000 milliLiter(s) IV Continuous <Continuous>  sodium phosphate IVPB 30 milliMole(s) IV Intermittent once      ----------------------------------------------------------------------------------------    PHYSICAL EXAM  Constitutional - NAD, Comfortable  HEENT - +EVD, EOMI, +NGT, PERRL  Neck - Supple, No limited ROM  Chest - +coarse upper airway sounds, no wheezing  Cardiovascular - S1S2   Abdomen - Soft, NTND  Extremities - No C/C/E, No calf tenderness   Neurologic Exam -                    Cognitive - eyes closed, arousable, localizes to pain, inconsistent in following commands, semi-purposeful movements     Communication - +expressive aphasia     Cranial Nerves - unable to assess     Motor -                     LEFT    UE - ShAB 4/5, EF 4-/5, EE 4/5, WE 3/5,  5/5                    RIGHT UE - ShAB 0/5, EF 2/5, EE 2/5, WE 0/5,  0/5                    LEFT    LE - HF 2/5, KE 2/5, DF 4/5, PF 4/5                    RIGHT LE - HF 0/5, KE 0/5, DF 4/5, PF 4/5        Sensory - Intact to LT     Reflexes - DTR Intact, No primitive reflexive     Coordination - unable to assess     OculoVestibular - unable to assess     Balance - unable to assess    ----------------------------------------------------------------------------------------  ASSESSMENT/PLAN  84y Male with functional deficits after L basal ganglia hemorrhage    L basal ganglia hemorrhage - +EVD, Cont neurochecks, frequent turns  HTN - lopressor  Pulm - extubated, cont oral suctioning, albuterol  Sleep - Consider Melatonin 3mg as patient becomes more alert  GI - NGT for feeds, protonix  DVT PPX - SCDs only  Rehab - Pt currently intermittently opens eyes and follows commands inconsistently, currently nonverbal. Pending PT/OT eval. Will need continued follow up.    Will continue to follow for ongoing rehab needs and recommendations.

## 2019-09-30 NOTE — PROGRESS NOTE ADULT - ASSESSMENT
84y Male who is followed by neurology because of ICH    ICH  Likely hypertensive.  Avoid anti platelet medications.  Avoid anti coagulant medications.  Control BP, avoid SBP>140 titrate with cardene as needed.  Maintain normal CPP (60-70) to prevent CVA from hypoperfusion, ICP under 22'  Continue Keppra for seizure prevention.  q1h neurochecks.  Repeat head CT for any significant neuro changes and per neurosurgery for EVD position.   PT/OT when tolerated    Hypertension   Control blood pressure.     Case discussed with SICU team (Dr Faust attending).

## 2019-09-30 NOTE — PROGRESS NOTE ADULT - SUBJECTIVE AND OBJECTIVE BOX
INTERVAL HPI/OVERNIGHT EVENTS:    S/p extubation.  Pt still requiring NT suctioning throughout shift but maintaining airway.  Dobhoff placed and tube feeds initiated.  EVD remains in place and drained 80 cc x 24 hrs.  Pt is protecting airway but continues with poor mental status.  Intermittently follows commands.  Non-verbal.  Pt occasionally opens eyes.  Remains with low grade fever and currently Febrile to 101.8.  Remains without leukocytosis.      MEDICATIONS  (STANDING):  ALBUTerol    0.083% 2.5 milliGRAM(s) Nebulizer every 6 hours  chlorhexidine 2% Cloths 1 Application(s) Topical daily  pantoprazole  Injectable 40 milliGRAM(s) IV Push daily  sodium chloride 0.9%. 1000 milliLiter(s) (75 mL/Hr) IV Continuous <Continuous>    MEDICATIONS  (PRN):  acetaminophen    Suspension .. 650 milliGRAM(s) Oral every 6 hours PRN Temp greater or equal to 38C (100.4F)  acetaminophen  Suppository .. 650 milliGRAM(s) Rectal every 6 hours PRN Temp greater or equal to 38C (100.4F), Mild Pain (1 - 3)      Drug Dosing Weight  Height (cm): 172.7 (27 Sep 2019 20:00)  Weight (kg): 85.2 (27 Sep 2019 20:00)  BMI (kg/m2): 28.6 (27 Sep 2019 20:00)  BSA (m2): 1.99 (27 Sep 2019 20:00)        PAST MEDICAL & SURGICAL HISTORY:  Hypothyroid  HTN (hypertension)      ICU Vital Signs Last 24 Hrs  T(C): 38.4 (30 Sep 2019 04:00), Max: 38.8 (29 Sep 2019 18:00)  T(F): 101.1 (30 Sep 2019 04:00), Max: 101.8 (29 Sep 2019 18:00)  HR: 62 (30 Sep 2019 05:00) (54 - 91)  BP: 148/63 (30 Sep 2019 05:00) (82/55 - 158/68)  BP(mean): 90 (30 Sep 2019 05:00) (64 - 99)  ABP: --  ABP(mean): --  RR: 21 (30 Sep 2019 05:00) (18 - 41)  SpO2: 94% (30 Sep 2019 05:00) (88% - 96%)      ABG - ( 28 Sep 2019 20:50 )  pH, Arterial: 7.44  pH, Blood: x     /  pCO2: 39    /  pO2: 67    / HCO3: 26    / Base Excess: 1.8   /  SaO2: 94                  I&O's Detail    28 Sep 2019 07:01  -  29 Sep 2019 07:00  --------------------------------------------------------  IN:    sodium chloride 0.9%.: 1800 mL    Solution: 50 mL  Total IN: 1850 mL    OUT:    External Ventricular Device: 75 mL    Indwelling Catheter - Urethral: 1350 mL  Total OUT: 1425 mL    Total NET: 425 mL      29 Sep 2019 07:01  -  30 Sep 2019 05:48  --------------------------------------------------------  IN:    Enteral Tube Flush: 200 mL    Pivot 1.5: 320 mL    sodium chloride 0.9%.: 1650 mL  Total IN: 2170 mL    OUT:    External Ventricular Device: 80 mL    Indwelling Catheter - Urethral: 2105 mL  Total OUT: 2185 mL    Total NET: -15 mL              Physical Exam:    Neurological:  Pt occasionally opens eyes.  Follows simple commands.  LUE strength > than RUE.  Moving bilateral lower extremities equally.  Non-verbal    HEENT: EVD in place.  PERRLA, EOMI, no drainage or redness    Neck: No bruits; no thyromegaly or nodules,  No JVD    Back: Normal spine flexure, No CVA tenderness, No deformity or limitation of movement    Respiratory: Breath Sounds equal & clear to auscultation, no accessory muscle use    Cardiovascular: Regular rate & rhythm, normal S1, S2; no murmurs, gallops or rubs    Gastrointestinal: Soft, non-tender, normal bowel sounds    Extremities: No peripheral edema, No cyanosis, clubbing     Vascular: Equal and normal pulses: 2+ peripheral pulses throughout      LABS:  CBC Full  -  ( 30 Sep 2019 05:10 )  WBC Count : 7.31 K/uL  RBC Count : 3.77 M/uL  Hemoglobin : 11.8 g/dL  Hematocrit : 36.2 %  Platelet Count - Automated : 128 K/uL  Mean Cell Volume : 96.0 fl  Mean Cell Hemoglobin : 31.3 pg  Mean Cell Hemoglobin Concentration : 32.6 gm/dL  Auto Neutrophil # : 4.81 K/uL  Auto Lymphocyte # : 1.11 K/uL  Auto Monocyte # : 1.24 K/uL  Auto Eosinophil # : 0.08 K/uL  Auto Basophil # : 0.04 K/uL  Auto Neutrophil % : 65.8 %  Auto Lymphocyte % : 15.2 %  Auto Monocyte % : 17.0 %  Auto Eosinophil % : 1.1 %  Auto Basophil % : 0.5 %    09-29    139  |  104  |  15.0  ----------------------------<  102  3.9   |  26.0  |  0.80    Ca    8.5<L>      29 Sep 2019 05:13  Phos  2.7     09-29  Mg     1.8     09-29

## 2019-09-30 NOTE — PHYSICAL THERAPY INITIAL EVALUATION ADULT - CRITERIA FOR SKILLED THERAPEUTIC INTERVENTIONS
rehab potential/anticipated equipment needs at discharge/impairments found/therapy frequency/predicted duration of therapy intervention/functional limitations in following categories/risk reduction/prevention/anticipated discharge recommendation

## 2019-10-01 DIAGNOSIS — I61.0 NONTRAUMATIC INTRACEREBRAL HEMORRHAGE IN HEMISPHERE, SUBCORTICAL: ICD-10-CM

## 2019-10-01 DIAGNOSIS — Z51.5 ENCOUNTER FOR PALLIATIVE CARE: ICD-10-CM

## 2019-10-01 LAB
ANION GAP SERPL CALC-SCNC: 12 MMOL/L — SIGNIFICANT CHANGE UP (ref 5–17)
APPEARANCE CSF: ABNORMAL
BASOPHILS # BLD AUTO: 0.03 K/UL — SIGNIFICANT CHANGE UP (ref 0–0.2)
BASOPHILS NFR BLD AUTO: 0.4 % — SIGNIFICANT CHANGE UP (ref 0–2)
BUN SERPL-MCNC: 16 MG/DL — SIGNIFICANT CHANGE UP (ref 8–20)
CALCIUM SERPL-MCNC: 8 MG/DL — LOW (ref 8.6–10.2)
CHLORIDE SERPL-SCNC: 106 MMOL/L — SIGNIFICANT CHANGE UP (ref 98–107)
CO2 SERPL-SCNC: 24 MMOL/L — SIGNIFICANT CHANGE UP (ref 22–29)
COLOR CSF: ABNORMAL
CREAT SERPL-MCNC: 0.85 MG/DL — SIGNIFICANT CHANGE UP (ref 0.5–1.3)
EOSINOPHIL # BLD AUTO: 0.03 K/UL — SIGNIFICANT CHANGE UP (ref 0–0.5)
EOSINOPHIL NFR BLD AUTO: 0.4 % — SIGNIFICANT CHANGE UP (ref 0–6)
GLUCOSE BLDC GLUCOMTR-MCNC: 123 MG/DL — HIGH (ref 70–99)
GLUCOSE BLDC GLUCOMTR-MCNC: 132 MG/DL — HIGH (ref 70–99)
GLUCOSE BLDC GLUCOMTR-MCNC: 137 MG/DL — HIGH (ref 70–99)
GLUCOSE CSF-MCNC: 68 MG/DLG/24H — SIGNIFICANT CHANGE UP (ref 40–70)
GLUCOSE SERPL-MCNC: 114 MG/DL — SIGNIFICANT CHANGE UP (ref 70–115)
GRAM STN FLD: SIGNIFICANT CHANGE UP
HCT VFR BLD CALC: 36.7 % — LOW (ref 39–50)
HGB BLD-MCNC: 12.1 G/DL — LOW (ref 13–17)
IMM GRANULOCYTES NFR BLD AUTO: 1.1 % — SIGNIFICANT CHANGE UP (ref 0–1.5)
LYMPHOCYTES # BLD AUTO: 1.22 K/UL — SIGNIFICANT CHANGE UP (ref 1–3.3)
LYMPHOCYTES # BLD AUTO: 17.2 % — SIGNIFICANT CHANGE UP (ref 13–44)
LYMPHOCYTES # CSF: 4 % — LOW (ref 40–80)
MAGNESIUM SERPL-MCNC: 2.2 MG/DL — SIGNIFICANT CHANGE UP (ref 1.6–2.6)
MCHC RBC-ENTMCNC: 31.8 PG — SIGNIFICANT CHANGE UP (ref 27–34)
MCHC RBC-ENTMCNC: 33 GM/DL — SIGNIFICANT CHANGE UP (ref 32–36)
MCV RBC AUTO: 96.3 FL — SIGNIFICANT CHANGE UP (ref 80–100)
MONOCYTES # BLD AUTO: 1.31 K/UL — HIGH (ref 0–0.9)
MONOCYTES NFR BLD AUTO: 18.5 % — HIGH (ref 2–14)
MONOS+MACROS NFR CSF: 18 % — SIGNIFICANT CHANGE UP
NEUTROPHILS # BLD AUTO: 4.43 K/UL — SIGNIFICANT CHANGE UP (ref 1.8–7.4)
NEUTROPHILS # CSF: 78 % — SIGNIFICANT CHANGE UP
NEUTROPHILS NFR BLD AUTO: 62.4 % — SIGNIFICANT CHANGE UP (ref 43–77)
NRBC NFR CSF: 344 /UL — HIGH (ref 0–5)
OSMOLALITY SERPL: 303 MOSMOL/KG — HIGH (ref 280–301)
PHOSPHATE SERPL-MCNC: 2.8 MG/DL — SIGNIFICANT CHANGE UP (ref 2.4–4.7)
PLATELET # BLD AUTO: 152 K/UL — SIGNIFICANT CHANGE UP (ref 150–400)
POTASSIUM SERPL-MCNC: 3.2 MMOL/L — LOW (ref 3.5–5.3)
POTASSIUM SERPL-SCNC: 3.2 MMOL/L — LOW (ref 3.5–5.3)
PROT CSF-MCNC: 57 MG/DL — HIGH (ref 15–45)
RBC # BLD: 3.81 M/UL — LOW (ref 4.2–5.8)
RBC # CSF: 9865 /CMM — HIGH (ref 0–1)
RBC # FLD: 14.3 % — SIGNIFICANT CHANGE UP (ref 10.3–14.5)
SODIUM SERPL-SCNC: 142 MMOL/L — SIGNIFICANT CHANGE UP (ref 135–145)
SPECIMEN SOURCE: SIGNIFICANT CHANGE UP
TUBE TYPE: SIGNIFICANT CHANGE UP
WBC # BLD: 7.1 K/UL — SIGNIFICANT CHANGE UP (ref 3.8–10.5)
WBC # FLD AUTO: 7.1 K/UL — SIGNIFICANT CHANGE UP (ref 3.8–10.5)

## 2019-10-01 PROCEDURE — 99233 SBSQ HOSP IP/OBS HIGH 50: CPT

## 2019-10-01 PROCEDURE — 99223 1ST HOSP IP/OBS HIGH 75: CPT

## 2019-10-01 PROCEDURE — 99291 CRITICAL CARE FIRST HOUR: CPT

## 2019-10-01 PROCEDURE — 99232 SBSQ HOSP IP/OBS MODERATE 35: CPT

## 2019-10-01 PROCEDURE — 99233 SBSQ HOSP IP/OBS HIGH 50: CPT | Mod: GC

## 2019-10-01 RX ORDER — HYDRALAZINE HCL 50 MG
10 TABLET ORAL ONCE
Refills: 0 | Status: COMPLETED | OUTPATIENT
Start: 2019-10-01 | End: 2019-10-01

## 2019-10-01 RX ORDER — LANOLIN ALCOHOL/MO/W.PET/CERES
3 CREAM (GRAM) TOPICAL AT BEDTIME
Refills: 0 | Status: DISCONTINUED | OUTPATIENT
Start: 2019-10-01 | End: 2019-10-01

## 2019-10-01 RX ORDER — LANOLIN ALCOHOL/MO/W.PET/CERES
3 CREAM (GRAM) TOPICAL AT BEDTIME
Refills: 0 | Status: DISCONTINUED | OUTPATIENT
Start: 2019-10-01 | End: 2019-10-14

## 2019-10-01 RX ORDER — METOPROLOL TARTRATE 50 MG
5 TABLET ORAL EVERY 6 HOURS
Refills: 0 | Status: DISCONTINUED | OUTPATIENT
Start: 2019-10-01 | End: 2019-10-02

## 2019-10-01 RX ORDER — POTASSIUM CHLORIDE 20 MEQ
40 PACKET (EA) ORAL
Refills: 0 | Status: COMPLETED | OUTPATIENT
Start: 2019-10-01 | End: 2019-10-01

## 2019-10-01 RX ORDER — PRIMIDONE 250 MG/1
50 TABLET ORAL
Refills: 0 | Status: DISCONTINUED | OUTPATIENT
Start: 2019-10-01 | End: 2019-10-14

## 2019-10-01 RX ORDER — AMANTADINE HCL 100 MG
100 CAPSULE ORAL
Refills: 0 | Status: DISCONTINUED | OUTPATIENT
Start: 2019-10-01 | End: 2019-10-02

## 2019-10-01 RX ADMIN — Medication 650 MILLIGRAM(S): at 19:00

## 2019-10-01 RX ADMIN — Medication 650 MILLIGRAM(S): at 05:10

## 2019-10-01 RX ADMIN — Medication 3 MILLIGRAM(S): at 22:18

## 2019-10-01 RX ADMIN — Medication 650 MILLIGRAM(S): at 13:00

## 2019-10-01 RX ADMIN — ALBUTEROL 2.5 MILLIGRAM(S): 90 AEROSOL, METERED ORAL at 09:09

## 2019-10-01 RX ADMIN — SODIUM CHLORIDE 75 MILLILITER(S): 9 INJECTION INTRAMUSCULAR; INTRAVENOUS; SUBCUTANEOUS at 17:22

## 2019-10-01 RX ADMIN — Medication 40 MILLIEQUIVALENT(S): at 12:04

## 2019-10-01 RX ADMIN — Medication 650 MILLIGRAM(S): at 12:04

## 2019-10-01 RX ADMIN — CHLORHEXIDINE GLUCONATE 1 APPLICATION(S): 213 SOLUTION TOPICAL at 11:06

## 2019-10-01 RX ADMIN — Medication 5 MILLIGRAM(S): at 17:22

## 2019-10-01 RX ADMIN — SODIUM CHLORIDE 75 MILLILITER(S): 9 INJECTION INTRAMUSCULAR; INTRAVENOUS; SUBCUTANEOUS at 22:13

## 2019-10-01 RX ADMIN — ENOXAPARIN SODIUM 40 MILLIGRAM(S): 100 INJECTION SUBCUTANEOUS at 11:06

## 2019-10-01 RX ADMIN — Medication 40 MILLIEQUIVALENT(S): at 10:44

## 2019-10-01 RX ADMIN — ALBUTEROL 2.5 MILLIGRAM(S): 90 AEROSOL, METERED ORAL at 20:54

## 2019-10-01 RX ADMIN — Medication 5 MILLIGRAM(S): at 11:06

## 2019-10-01 RX ADMIN — Medication 10 MILLIGRAM(S): at 12:44

## 2019-10-01 RX ADMIN — ALBUTEROL 2.5 MILLIGRAM(S): 90 AEROSOL, METERED ORAL at 02:27

## 2019-10-01 RX ADMIN — PANTOPRAZOLE SODIUM 40 MILLIGRAM(S): 20 TABLET, DELAYED RELEASE ORAL at 11:07

## 2019-10-01 RX ADMIN — PRIMIDONE 50 MILLIGRAM(S): 250 TABLET ORAL at 22:12

## 2019-10-01 RX ADMIN — ALBUTEROL 2.5 MILLIGRAM(S): 90 AEROSOL, METERED ORAL at 14:38

## 2019-10-01 RX ADMIN — Medication 650 MILLIGRAM(S): at 04:40

## 2019-10-01 RX ADMIN — Medication 650 MILLIGRAM(S): at 17:10

## 2019-10-01 RX ADMIN — Medication 5 MILLIGRAM(S): at 05:58

## 2019-10-01 NOTE — PROGRESS NOTE ADULT - SUBJECTIVE AND OBJECTIVE BOX
24 HOUR EVENTS: No acute events overnight.  EVD without output today (set to drain at 15mmhg).  Tolerating tube feeds. Febrile to 100.8- given tylenol with response.  Continues to require NT suctioning however are all upper secretions due to decreased clearance. UOP adequate.       SUBJECTIVE/ROS:  [ ] A ten-point review of systems was otherwise negative except as noted.  [x ] Due to altered mental status/intubation, subjective information were not able to be obtained from the patient. History was obtained, to the extent possible, from review of the chart and collateral sources of information.      NEURO  RASS:  -1-0   GCS:     [facility] ICU:  Exam: See below   Meds: acetaminophen    Suspension .. 650 milliGRAM(s) Oral every 6 hours PRN Temp greater or equal to 38C (100.4F)    [x] Adequacy of sedation and pain control has been assessed and adjusted      RESPIRATORY  RR: 24 (10-01-19 @ 03:00) (18 - 50)  SpO2: 97% (10-01-19 @ 03:00) (92% - 100%)  Wt(kg): --  Exam: unlabored, clear to auscultation bilaterally, diminished at L base  Mechanical Ventilation:     [ ] Extubation Readiness Assessed  Meds: ALBUTerol    0.083% 2.5 milliGRAM(s) Nebulizer every 6 hours        CARDIOVASCULAR  HR: 64 (10-01-19 @ 03:00) (59 - 87)  BP: 148/67 (10-01-19 @ 03:00) (110/55 - 191/91)  BP(mean): 97 (10-01-19 @ 03:00) (78 - 130)  ABP: --  ABP(mean): --  Wt(kg): --  CVP(cm H2O): --      Exam: NSR   Cardiac Rhythm: RRR  Perfusion     [ x]Adequate   [ ]Inadequate  Mentation   [ ]Normal       [x ]Reduced  Extremities  [ x]Warm         [ ]Cool  Volume Status [ ]Hypervolemic [x ]Euvolemic [ ]Hypovolemic  Meds: metoprolol tartrate Injectable 5 milliGRAM(s) IV Push every 6 hours        GI/NUTRITION  Exam: softly distended, nttp, no rebound or guarding  Diet: TF at 30  Meds: pantoprazole  Injectable 40 milliGRAM(s) IV Push daily      GENITOURINARY  I&O's Detail    09-29 @ 07:01 - 09-30 @ 07:00  --------------------------------------------------------  IN:    Enteral Tube Flush: 200 mL    Pivot 1.5: 420 mL    sodium chloride 0.9%.: 1800 mL  Total IN: 2420 mL    OUT:    External Ventricular Device: 84 mL    Indwelling Catheter - Urethral: 2190 mL  Total OUT: 2274 mL    Total NET: 146 mL      09-30 @ 07:01  -  10-01 @ 04:12  --------------------------------------------------------  IN:    Oral Fluid: 120 mL    Pivot 1.5: 340 mL    sodium chloride 0.9%.: 1500 mL    Solution: 625 mL    Solution: 250 mL  Total IN: 2835 mL    OUT:    External Ventricular Device: 80 mL    Indwelling Catheter - Urethral: 1530 mL  Total OUT: 1610 mL    Total NET: 1225 mL          09-30    142  |  106  |  18.0  ----------------------------<  124<H>  4.0   |  25.0  |  0.78    Ca    8.5<L>      30 Sep 2019 05:10  Phos  2.6     09-30  Mg     2.2     09-30      [x ] Morris catheter, indication: strict i/o's  Meds: sodium chloride 0.9%. 1000 milliLiter(s) IV Continuous <Continuous>    Physical Exam:    Neurological:  Pt occasionally opens eyes.  Follows simple commands.  LUE strength > than RUE.  Moving bilateral lower extremities equally.  Non-verbal    HEENT: EVD in place.  PERRL, EOMI, no drainage or redness    Neck: No bruits; no thyromegaly or nodules,  No JVD    Back: Normal spine flexure, No CVA tenderness, No deformity or limitation of movement    Respiratory: Breath Sounds equal & clear to auscultation, R base diminsihed    Cardiovascular: Regular rate & rhythm, normal S1, S2; no murmurs, gallops or rubs    Gastrointestinal: Soft, non-tender, normal bowel sounds    Extremities: No peripheral edema, No cyanosis, clubbing     Vascular: Equal and normal pulses: 2+ peripheral pulses throughout        HEMATOLOGIC  Meds: enoxaparin Injectable 40 milliGRAM(s) SubCutaneous daily    [x] VTE Prophylaxis                        11.8   7.31  )-----------( 128      ( 30 Sep 2019 05:10 )             36.2       Transfusion     [ ] PRBC   [ ] Platelets   [ ] FFP   [ ] Cryoprecipitate      INFECTIOUS DISEASES  T(C): 38 (10-01-19 @ 00:00), Max: 38.4 (09-30-19 @ 16:00)  Wt(kg): --  WBC Count: 7.31 K/uL (09-30 @ 05:10)    Recent Cultures:    Meds:       ENDOCRINE  Capillary Blood Glucose    Meds:       ACCESS DEVICES:  [ ] Peripheral IV  [ ] Central Venous Line	[ ] R	[ ] L	[ ] IJ	[ ] Fem	[ ] SC	Placed:   [ ] Arterial Line		[ ] R	[ ] L	[ ] Fem	[ ] Rad	[ ] Ax	Placed:   [ ] PICC:					[ ] Mediport  [ ] Urinary Catheter, Date Placed:   [ ] Necessity of urinary, arterial, and venous catheters discussed    OTHER MEDICATIONS:  chlorhexidine 2% Cloths 1 Application(s) Topical daily      CODE STATUS:     IMAGING:

## 2019-10-01 NOTE — PROGRESS NOTE ADULT - ASSESSMENT
85 y/o male with large left BGH, intraventricular extension , EVD placed, draining CSF 85 y/o male with large left BGH, intraventricular extension , EVD placed, draining CSF, extubated.     - Case discussed w/ Dr. Rock  - EVD placed at 20   - Pt w/ + temp 103, acquire CSF cultures  - maintain normothermia   - CT scan tomorrow in AM,   - recommend face tent for O2 requirements  - recommend ICP goal< 22, CPP goal 60-70  - please continue q1 hr neuro checks  - recommend serum Na goal of 140-145 as a goal  - continue to coordinate care w/ primary team.

## 2019-10-01 NOTE — PROGRESS NOTE ADULT - SUBJECTIVE AND OBJECTIVE BOX
INTERVAL HPI/OVERNIGHT EVENTS:  Pt admitted on 9/27/19  Pt remains extubated. Not opening eyes at this time. Following Commands throughout.  With stimuli moves all extrem left side greater than right.     MEDICATIONS  (STANDING):  ALBUTerol    0.083% 2.5 milliGRAM(s) Nebulizer every 6 hours  chlorhexidine 2% Cloths 1 Application(s) Topical daily  enoxaparin Injectable 40 milliGRAM(s) SubCutaneous daily  hydrALAZINE Injectable 10 milliGRAM(s) IV Push once  metoprolol tartrate Injectable 5 milliGRAM(s) IV Push every 6 hours  pantoprazole  Injectable 40 milliGRAM(s) IV Push daily  sodium chloride 0.9%. 1000 milliLiter(s) (75 mL/Hr) IV Continuous <Continuous>    MEDICATIONS  (PRN):  acetaminophen    Suspension .. 650 milliGRAM(s) Oral every 6 hours PRN Temp greater or equal to 38C (100.4F)      Allergies  No Known Allergies  Intolerances      Vital Signs Last 24 Hrs  T(C): 39.4 (01 Oct 2019 12:00), Max: 39.4 (01 Oct 2019 12:00)  T(F): 103 (01 Oct 2019 12:00), Max: 103 (01 Oct 2019 12:00)  HR: 70 (01 Oct 2019 12:00) (60 - 87)  BP: 174/78 (01 Oct 2019 12:00) (117/54 - 191/91)  BP(mean): 112 (01 Oct 2019 12:00) (78 - 130)  RR: 42 (01 Oct 2019 12:00) (17 - 50)  SpO2: 95% (01 Oct 2019 12:00) (90% - 97%) BMI (kg/m2): 28.6 (09-27-19 @ 20:00)      PHYSICAL EXAM  GENERAL: NAD, well-groomed, well-developed  HEAD:  Atraumatic, Normocephalic Right sided ventriculostomy dressing clean and dry at 15 cm of H20 drained 93cc/ 24 hrs.   EYES: EOMI, PERRLA, conjunctiva and sclera clear, midposition  ENMT: No tonsillar erythema, exudates, or enlargement; Moist mucous membranes, Good dentition, No lesions  NECK: Supple, No JVD, Normal thyroid  NERVOUS SYSTEM: nonverbal, moving the left greater than the right Motor Strength left 4/5 triceps, 3/5 biceps, B/L upper and lower extremities; DTRs 2+ intact and symmetric  CHEST/LUNG: Clear to percussion bilaterally; No rales, rhonchi, wheezing, or rubs  HEART: Regular rate and rhythm; No murmurs, rubs, or gallops  ABDOMEN: Soft, Nontender, Nondistended; Bowel sounds present  EXTREMITIES:  2+ Peripheral Pulses, No clubbing, cyanosis, or edema  LYMPH: No lymphadenopathy noted  SKIN: No rashes or lesions      LABS:                          12.1   7.10  )-----------( 152      ( 01 Oct 2019 05:02 )             36.7     10-01    142  |  106  |  16.0  ----------------------------<  114  3.2<L>   |  24.0  |  0.85    Ca    8.0<L>      01 Oct 2019 05:02  Phos  2.8     10-01  Mg     2.2     10-01          I&O's Detail    30 Sep 2019 07:01  -  01 Oct 2019 07:00  --------------------------------------------------------  IN:    Oral Fluid: 210 mL    Pivot 1.5: 460 mL    sodium chloride 0.9%.: 1800 mL    Solution: 625 mL    Solution: 250 mL  Total IN: 3345 mL    OUT:    External Ventricular Device: 93 mL    Indwelling Catheter - Urethral: 1645 mL  Total OUT: 1738 mL    Total NET: 1607 mL      01 Oct 2019 07:01  -  01 Oct 2019 13:09  --------------------------------------------------------  IN:    Enteral Tube Flush: 200 mL    Pivot 1.5: 180 mL    sodium chloride 0.9%.: 450 mL  Total IN: 830 mL    OUT:    External Ventricular Device: 6 mL    Indwelling Catheter - Urethral: 200 mL  Total OUT: 206 mL    Total NET: 624 mL        RADIOLOGY & ADDITIONAL TESTS:

## 2019-10-01 NOTE — PROGRESS NOTE ADULT - ASSESSMENT
83 y/o male with large left BGH, intraventricular extension , EVD placed, draining CSF    Neuro: Large BGH. -Cont Q1hr neuro checks, cont evd as per nsg, pain control however hold sedating meds. Delirium precautions    Card: Cont hemodynamic monitoring, keep normotensive 120-160, lopressor for hypertension w/ aggressive hold parameters.     Pulm: Aggressive pulmonary toileting, cont strict NT suctioning. Cont ETCO2 for signs of inadequate ventilation. HOB 35-45 degrees, IS if will participate?     GI: Continue tube feeds, consider advancing if hypophosphatemia improves     : Keep montejo at this time, strict i/o's    Endo: Keep euglycemic no coverage needed    ID:  Procalcitonin negative, without leukocytosis or obvious source of infection.  Cont to monitor fever curve and consider blood culture, UA    Hem/DVT: SCDs, screening dvt ppx negative 9/30, lovenox for chemical dvt ppx     Skin: Frequent turning while in bed    Dispo: SICU, high risk for neurologic and pulmonary decompensation

## 2019-10-01 NOTE — CONSULT NOTE ADULT - SUBJECTIVE AND OBJECTIVE BOX
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 Spoke with patients wife about events this morning. As per wife ,  woke up normal , walked around but came back to bed. When he came into bed, his legs stiffened, he was not able to talk, rolled his legs off the bed, his body followed. His wife put a pillow under his head, he began to foam at the mouth.  No loss of urine or tongue biting. Wife called ambulance, he was taken to [facility], intubated for airway protection, cat scan results show large left basal ganglia hemorrhage. Patient was than transferred to [facility] for higher level of care    PERTINENT PMH REVIEWED:  [ x] YES [ ] NO         HTN   Hypothyroid  SOCIAL HISTORY:  EtOH [ ] Yes  [ ] No                                    Drugs [ ] Yes [ ] No                                   [ ] smoker [ ] nonsmoker                                    Admitted from: [ ] home [ ] SNF _________ [ ] [facility] ________    Surrogate/HCP/Guardian: [ ] YES [ ] NO                                Phone#:    FAMILY HISTORY:      Baseline ADLs (prior to admission):  Independent [ ] moderately [ ] fully   Dependent   [ ] moderately [ ]fully    MEDICATIONS  (STANDING):  ALBUTerol    0.083% 2.5 milliGRAM(s) Nebulizer every 6 hours  chlorhexidine 2% Cloths 1 Application(s) Topical daily  enoxaparin Injectable 40 milliGRAM(s) SubCutaneous daily  metoprolol tartrate Injectable 5 milliGRAM(s) IV Push every 6 hours  pantoprazole  Injectable 40 milliGRAM(s) IV Push daily  potassium chloride   Powder 40 milliEquivalent(s) Oral every 2 hours  sodium chloride 0.9%. 1000 milliLiter(s) (75 mL/Hr) IV Continuous <Continuous>    MEDICATIONS  (PRN):  acetaminophen    Suspension .. 650 milliGRAM(s) Oral every 6 hours PRN Temp greater or equal to 38C (100.4F)      Allergies    No Known Allergies    Intolerances        REVIEW OF SYSTEMS     see above  x ] Unable to obtain due to poor mentation       Karnofsky Performance Score/Palliative Performance Status Version 2:  20  %    Vital Signs Last 24 Hrs  T(C): 37.9 (01 Oct 2019 08:00), Max: 38.6 (01 Oct 2019 04:00)  T(F): 100.2 (01 Oct 2019 08:00), Max: 101.5 (01 Oct 2019 04:00)  HR: 69 (01 Oct 2019 11:00) (60 - 87)  BP: 170/75 (01 Oct 2019 11:00) (117/54 - 191/91)  BP(mean): 108 (01 Oct 2019 11:00) (78 - 130)  RR: 17 (01 Oct 2019 11:00) (17 - 50)  SpO2: 92% (01 Oct 2019 11:00) (90% - 97%)    PHYSICAL EXAM:    General: Elderly man, NAD, arouses to tactile stimuli     HEENT: [x ] normal  [ ] dry mouth  [ ] ET tube/trach    Lungs: [ x] comfortable [ ] tachypnea/labored breathing  [ ] excessive secretions    CV: [ x] normal  [ ] tachycardia    GI: [ x] normal  [ ] distended  [ ] tender  [ ] no BS               [ ] PEG/NG/OG tube    : [x ] normal  [ ] incontinent  [ ] oliguria/anuria  [ ] montejo    MSK: [ ] normal  [ x] weakness  [ ] edema             [ ] ambulatory  [x ] bedbound/wheelchair bound    Skin: [ ] normal  [ ] pressure ulcers- Stage_____  [x ] no rash    LABS:                        12.1   7.10  )-----------( 152      ( 01 Oct 2019 05:02 )             36.7     10-01    142  |  106  |  16.0  ----------------------------<  114  3.2<L>   |  24.0  |  0.85    Ca    8.0<L>      01 Oct 2019 05:02  Phos  2.8     10-01  Mg     2.2     10-01          I&O's Summary    30 Sep 2019 07:01  -  01 Oct 2019 07:00  --------------------------------------------------------  IN: 3345 mL / OUT: 1738 mL / NET: 1607 mL    01 Oct 2019 07:01  -  01 Oct 2019 11:48  --------------------------------------------------------  IN: 420 mL / OUT: 126 mL / NET: 294 mL        RADIOLOGY & ADDITIONAL STUDIES:      < from: CT Head No Cont (09.30.19 @ 13:57) >   EXAM:  CT BRAIN                          PROCEDURE DATE:  09/30/2019          INTERPRETATION:  CLINICAL Indications:  SAH    COMPARISON: CT head dated 9/27/2019    TECHNIQUE: Noncontrast CT of the head. Multiplanar reformations are   submitted.    FINDINGS:  Left basal ganglia intraparenchymal hematoma is redemonstrated on image   31, series 2 which measures 2.9 x 1.6 cm, stable in size with stable   adjacent edema. Slightly improved intraventricular hemorrhage in the left   lateral ventricle and right occipital horn. Mildly improved   intraventricular hemorrhage in the third ventricle and fourth ventricle.   Right frontal approach shunt catheter is redemonstrated with its tip in   the left frontal horn. Stable ventricular size.     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 Trace subarachnoid   hemorrhage in the paramedian right parietal lobe which 40, series 2.   Trace left paramedian frontal lobe subarachnoid hemorrhage.    IMPRESSION: Stable left basal ganglia hemorrhage. Improved   intraventricular hemorrhage. New trace bilateral paramedian frontal   parietal lobe areas of subarachnoid hemorrhage. Stable ventricular size.    < end of copied text >    < from: Xray Chest 1 View- PORTABLE-Urgent (09.28.19 @ 20:41) >   EXAM:  XR CHEST PORTABLE URGENT 1V                          PROCEDURE DATE:  09/28/2019      < from: CT Head No Cont (09.30.19 @ 13:57) >     EXAM:  CT BRAIN                          PROCEDURE DATE:  09/30/2019          INTERPRETATION:  CLINICAL Indications:  SAH    COMPARISON: CT head dated 9/27/2019    TECHNIQUE: Noncontrast CT of the head. Multiplanar reformations are   submitted.    FINDINGS:  Left basal ganglia intraparenchymal hematoma is redemonstrated on image   31, series 2 which measures 2.9 x 1.6 cm, stable in size with stable   adjacent edema. Slightly improved intraventricular hemorrhage in the left   lateral ventricle and right occipital horn. Mildly improved   intraventricular hemorrhage in the third ventricle and fourth ventricle.   Right frontal approach shunt catheter is redemonstrated with its tip in   the left frontal horn. Stable ventricular size.     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 Trace subarachnoid   hemorrhage in the paramedian right parietal lobe which 40, series 2.   Trace left paramedian frontal lobe subarachnoid hemorrhage.    IMPRESSION: Stable left basal ganglia hemorrhage. Improved   intraventricular hemorrhage. New trace bilateral paramedian frontal   parietal lobe areas of subarachnoid hemorrhage. Stable ventricular size.    < end of copied text >      ADVANCE DIRECTIVES:  [ ] YES [x ] NO   DNR [ ] YES [x ] NO  Completed on:                     MOLST  [ ] YES [x] NO   Completed on:  Living Will  [ ] YES [ x] NO   Completed on:    Thank you for the opportunity to assist with the care of this patient.   [facility] Consult Service [phone]. 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 Spoke with patients wife about events this morning. As per wife ,  woke up normal , walked around but came back to bed. When he came into bed, his legs stiffened, he was not able to talk, rolled his legs off the bed, his body followed. His wife put a pillow under his head, he began to foam at the mouth.  No loss of urine or tongue biting. Wife called ambulance, he was taken to [facility], intubated for airway protection, cat scan results show large left basal ganglia hemorrhage. Patient was than transferred to [facility] for higher level of care    PERTINENT PMH REVIEWED:  [ x] YES [ ] NO         HTN   Hypothyroid  SOCIAL HISTORY history limited- poor MS                          Admitted from: transferred from [facility]  Surrogate/HCP/Guardian: wife Shania Bazzi    FAMILY HISTORY:      Baseline ADLs (prior to admission):  Independent [ ] moderately [x ] fully   Dependent   [ ] moderately [ ]fully    MEDICATIONS  (STANDING):  ALBUTerol    0.083% 2.5 milliGRAM(s) Nebulizer every 6 hours  chlorhexidine 2% Cloths 1 Application(s) Topical daily  enoxaparin Injectable 40 milliGRAM(s) SubCutaneous daily  metoprolol tartrate Injectable 5 milliGRAM(s) IV Push every 6 hours  pantoprazole  Injectable 40 milliGRAM(s) IV Push daily  potassium chloride   Powder 40 milliEquivalent(s) Oral every 2 hours  sodium chloride 0.9%. 1000 milliLiter(s) (75 mL/Hr) IV Continuous <Continuous>    MEDICATIONS  (PRN):  acetaminophen    Suspension .. 650 milliGRAM(s) Oral every 6 hours PRN Temp greater or equal to 38C (100.4F)      Allergies    No Known Allergies    Intolerances        REVIEW OF SYSTEMS     see above  x ] Unable to obtain due to poor mentation       Karnofsky Performance Score/Palliative Performance Status Version 2:  20  %    Vital Signs Last 24 Hrs  T(C): 37.9 (01 Oct 2019 08:00), Max: 38.6 (01 Oct 2019 04:00)  T(F): 100.2 (01 Oct 2019 08:00), Max: 101.5 (01 Oct 2019 04:00)  HR: 69 (01 Oct 2019 11:00) (60 - 87)  BP: 170/75 (01 Oct 2019 11:00) (117/54 - 191/91)  BP(mean): 108 (01 Oct 2019 11:00) (78 - 130)  RR: 17 (01 Oct 2019 11:00) (17 - 50)  SpO2: 92% (01 Oct 2019 11:00) (90% - 97%)    PHYSICAL EXAM:    General: Elderly man, NAD, arouses to tactile stimuli     HEENT: [x ] normal  [ ] dry mouth  [ ] ET tube/trach    Lungs: [ x] comfortable [ ] tachypnea/labored breathing  [ ] excessive secretions    CV: [ x] normal  [ ] tachycardia    GI: [ x] normal  [ ] distended  [ ] tender  [ ] no BS               [ ] PEG/NG/OG tube    : [x ] normal  [ ] incontinent  [ ] oliguria/anuria  [ ] montejo    MSK: [ ] normal  [ x] weakness  [ ] edema             [ ] ambulatory  [x ] bedbound/wheelchair bound    Skin: [ ] normal  [ ] pressure ulcers- Stage_____  [x ] no rash    LABS:                        12.1   7.10  )-----------( 152      ( 01 Oct 2019 05:02 )             36.7     10-01    142  |  106  |  16.0  ----------------------------<  114  3.2<L>   |  24.0  |  0.85    Ca    8.0<L>      01 Oct 2019 05:02  Phos  2.8     10-01  Mg     2.2     10-01          I&O's Summary    30 Sep 2019 07:01  -  01 Oct 2019 07:00  --------------------------------------------------------  IN: 3345 mL / OUT: 1738 mL / NET: 1607 mL    01 Oct 2019 07:01  -  01 Oct 2019 11:48  --------------------------------------------------------  IN: 420 mL / OUT: 126 mL / NET: 294 mL        RADIOLOGY & ADDITIONAL STUDIES:      < from: CT Head No Cont (09.30.19 @ 13:57) >   EXAM:  CT BRAIN                          PROCEDURE DATE:  09/30/2019          INTERPRETATION:  CLINICAL Indications:  SAH    COMPARISON: CT head dated 9/27/2019    TECHNIQUE: Noncontrast CT of the head. Multiplanar reformations are   submitted.    FINDINGS:  Left basal ganglia intraparenchymal hematoma is redemonstrated on image   31, series 2 which measures 2.9 x 1.6 cm, stable in size with stable   adjacent edema. Slightly improved intraventricular hemorrhage in the left   lateral ventricle and right occipital horn. Mildly improved   intraventricular hemorrhage in the third ventricle and fourth ventricle.   Right frontal approach shunt catheter is redemonstrated with its tip in   the left frontal horn. Stable ventricular size.     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 Trace subarachnoid   hemorrhage in the paramedian right parietal lobe which 40, series 2.   Trace left paramedian frontal lobe subarachnoid hemorrhage.    IMPRESSION: Stable left basal ganglia hemorrhage. Improved   intraventricular hemorrhage. New trace bilateral paramedian frontal   parietal lobe areas of subarachnoid hemorrhage. Stable ventricular size.    < end of copied text >    < from: Xray Chest 1 View- PORTABLE-Urgent (09.28.19 @ 20:41) >   EXAM:  XR CHEST PORTABLE URGENT 1V                          PROCEDURE DATE:  09/28/2019      < from: CT Head No Cont (09.30.19 @ 13:57) >     EXAM:  CT BRAIN                          PROCEDURE DATE:  09/30/2019          INTERPRETATION:  CLINICAL Indications:  SAH    COMPARISON: CT head dated 9/27/2019    TECHNIQUE: Noncontrast CT of the head. Multiplanar reformations are   submitted.    FINDINGS:  Left basal ganglia intraparenchymal hematoma is redemonstrated on image   31, series 2 which measures 2.9 x 1.6 cm, stable in size with stable   adjacent edema. Slightly improved intraventricular hemorrhage in the left   lateral ventricle and right occipital horn. Mildly improved   intraventricular hemorrhage in the third ventricle and fourth ventricle.   Right frontal approach shunt catheter is redemonstrated with its tip in   the left frontal horn. Stable ventricular size.     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 Trace subarachnoid   hemorrhage in the paramedian right parietal lobe which 40, series 2.   Trace left paramedian frontal lobe subarachnoid hemorrhage.    IMPRESSION: Stable left basal ganglia hemorrhage. Improved   intraventricular hemorrhage. New trace bilateral paramedian frontal   parietal lobe areas of subarachnoid hemorrhage. Stable ventricular size.    < end of copied text >      ADVANCE DIRECTIVES:  [ ] YES [x ] NO   DNR [ ] YES [x ] NO  Completed on:                     MOLST  [ ] YES [x] NO   Completed on:  Living Will  [ ] YES [ x] NO   Completed on:    Thank you for the opportunity to assist with the care of this patient.   [facility] Consult Service [phone].

## 2019-10-01 NOTE — PROGRESS NOTE ADULT - SUBJECTIVE AND OBJECTIVE BOX
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 Spoke with patients wife about events this morning. As per wife ,  woke up normal , walked around but came back to bed. When he came into bed, his legs stiffened, he was not able to talk, rolled his legs off the bed, his body followed. His wife put a pillow under his head, he began to foam at the mouth.  No loss of urine or tongue biting. Wife called ambulance, he was taken to [facility], intubated for airway protection, cat scan results show large left basal ganglia hemorrhage. Patient was than transferred to [facility] for higher level of care.      INTERVAL HPI/OVERNIGHT EVENTS:  EVD placed at 20 today. ICP remained 2 - 13 overnight, output 93 in 24hrs and 19 overnight. No reported events overnight.       Vital Signs Last 24 Hrs  T(C): 39.4 (01 Oct 2019 12:00), Max: 39.4 (01 Oct 2019 12:00)  T(F): 103 (01 Oct 2019 12:00), Max: 103 (01 Oct 2019 12:00)  HR: 70 (01 Oct 2019 12:00) (60 - 87)  BP: 174/78 (01 Oct 2019 12:00) (117/54 - 191/91)  BP(mean): 112 (01 Oct 2019 12:00) (78 - 130)  RR: 42 (01 Oct 2019 12:00) (17 - 50)  SpO2: 95% (01 Oct 2019 12:00) (90% - 97%)      PHYSICAL EXAM:  EXAMINATION: NAD  General:  NAD, cooperative.  HEENT:  Attempts to track  Neuro:  lethargic, follows commands, moves all extremities: LUE 5/5, LLE 5/5, RUE 3/5 + drift, RLE 3/5.  Cards:  + S1 +S2, no murmur appreciated   Respiratory:  no increased work of breathing   Abdomen:  soft  Skin:  warm/dry      LABS:                        12.1   7.10  )-----------( 152      ( 01 Oct 2019 05:02 )             36.7     10-01    142  |  106  |  16.0  ----------------------------<  114  3.2<L>   |  24.0  |  0.85    Ca    8.0<L>      01 Oct 2019 05:02  Phos  2.8     10-01  Mg     2.2     10-01        09-30 @ 07:01  -  10-01 @ 07:00  --------------------------------------------------------  IN: 3345 mL / OUT: 1738 mL / NET: 1607 mL    10-01 @ 07:01  -  10-01 @ 12:54  --------------------------------------------------------  IN: 830 mL / OUT: 206 mL / NET: 624 mL        RADIOLOGY & ADDITIONAL TESTS:

## 2019-10-01 NOTE — CONSULT NOTE ADULT - PROBLEM SELECTOR RECOMMENDATION 4
Met with family - wife, daughter and grandsons. Per wife, patient independent prior to this incident.  They had been previously informed his bleeding was now stable and feel a sense of relief that the 72hr critical period has now passed. Informed them, patient still needs close monitoring.  He has right sided functional deficits and aphasic. Plan to discuss with family goals of care. For now, they  seem a bit hopeful as bleed has stabilized.

## 2019-10-01 NOTE — PROGRESS NOTE ADULT - SUBJECTIVE AND OBJECTIVE BOX
[facility]                                        Neurology at [place]                                 Frieda Saunders, & Jose                                  [address]                                        [place], [place], [address]                                             [phone]        CC: intracranial hemorrhage     HPI:   The patient is a 84y Male who presented as a transfer to [facility] for management of ICH.  He apparently may have had seizure and right sided weakness and was brought to [facility].  The patient was transferred to [facility] for intracranial hemorrhage management and neurosurgical eval.  He was intubated for transfer.  An extraventricular drain was placed. (27 Sep 2019 17:30).    Interim history:  Remains in SICU.   Extubated.   The extraventricular drain is in place.     ROS:   Unobtainable due to patient's condition.     MEDICATIONS  (STANDING):  ALBUTerol    0.083% 2.5 milliGRAM(s) Nebulizer every 6 hours  chlorhexidine 2% Cloths 1 Application(s) Topical daily  enoxaparin Injectable 40 milliGRAM(s) SubCutaneous daily  metoprolol tartrate Injectable 5 milliGRAM(s) IV Push every 6 hours  pantoprazole  Injectable 40 milliGRAM(s) IV Push daily  potassium chloride   Powder 40 milliEquivalent(s) Oral every 2 hours  sodium chloride 0.9%. 1000 milliLiter(s) (75 mL/Hr) IV Continuous <Continuous>    Vital Signs Last 24 Hrs  T(C): 37.9 (01 Oct 2019 08:00), Max: 38.6 (01 Oct 2019 04:00)  T(F): 100.2 (01 Oct 2019 08:00), Max: 101.5 (01 Oct 2019 04:00)  HR: 69 (01 Oct 2019 11:00) (60 - 87)  BP: 170/75 (01 Oct 2019 11:00) (117/54 - 191/91)  BP(mean): 108 (01 Oct 2019 11:00) (78 - 130)  RR: 17 (01 Oct 2019 11:00) (17 - 50)  SpO2: 92% (01 Oct 2019 11:00) (90% - 97%)    Detailed Neurologic Exam:    Mental status: The patient is awake but non verbal. He attempts eye opening briefly. Not following instructions this morning.     Cranial nerves: Pupils equal and react symmetrically to light. There is no visual field deficit to threat. Extraocular motion is difficult to assess as he does not maintain eye opening for long. Facial musculature is asymmetric with a depression of the right nasolabial fold. Palate and tongue are difficult to evaluate.     Motor/sensory: There is normal bulk and tone.  There is no tremor.  Moving right minimally (2-3/5) to stimuli.  Moving left 5/5 to stimuli.     Reflexes: 1+ throughout and plantar responses are flexor left, extensor right.    Cerebellar: Cannot be assessed.     Labs:     10-01    142  |  106  |  16.0  ----------------------------<  114  3.2<L>   |  24.0  |  0.85    Ca    8.0<L>      01 Oct 2019 05:02  Phos  2.8     10-01  Mg     2.2     10-01                          12.1   7.10  )-----------( 152      ( 01 Oct 2019 05:02 )             36.7       Rad:   Repeat CT 9/30 reviewed. There is stable left basal ganglia intracranial hemorrhage with slightly improved intraventricular hemorrhage. There is some new trace bilateral subarachnoid hemorrhage in the frontal/parietal regions. Ventricular size is stable.

## 2019-10-01 NOTE — PROGRESS NOTE ADULT - SUBJECTIVE AND OBJECTIVE BOX
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 Pt received Keppra and mannitol.  EVD was placed on admission. Patient was extubated on 9/28.   Patient seen at bedside. Patient on face tent for oxygen requirements. EVD placed at 20 today. Unable to open eyes to pain or sternal rub. Attempts some flexion to pain on left side. As per nurse at bedside, pt does intermittently open eyes and can track intermittently.    CURRENT FUNCTIONAL STATUS  PT  9/30  Bed Mobility: Rolling/Turning:     · Level of [place]herson	maximum assist (25% patients effort)	  · Physical Assist/Nonphysical Assist	2 person assist; nonverbal cues (demo/gestures); verbal cues	    Bed Mobility: Scooting/Bridging:     · Level of [place]herson	maximum assist (25% patients effort)	  · Physical Assist/Nonphysical Assist	2 person assist; nonverbal cues (demo/gestures); verbal cues	    Bed Mobility: Sit to Supine:     · Level of [place]herson	maximum assist (25% patients effort)	  · Physical Assist/Nonphysical Assist	2 person assist; nonverbal cues (demo/gestures); verbal cues	    Bed Mobility: Supine to Sit:     · Level of Mc[place]herson	maximum assist (25% patients effort)	  · Physical Assist/Nonphysical Assist	2 person assist; verbal cues; nonverbal cues (demo/gestures)	    Bed Mobility Analysis:     · Bed Mobility Limitations	decreased ability to use arms for pushing/pulling; decreased ability to use legs for bridging/pushing	  · Impairments Contributing to Impaired Bed Mobility	cognition; decreased strength; impaired balance; decreased flexibility; impaired coordination	    Transfer: Sit to Stand:     · Level of Mc[place]herson	maximum assist (25% patients effort)	  · Physical Assist/Nonphysical Assist	2 person assist; nonverbal cues (demo/gestures); verbal cues	  · Weight-Bearing Restrictions	full weight-bearing	  · Assistive Device	hand held assist	    Transfer: Stand to Sit:     · Level of Mc[place]herson	maximum assist (25% patients effort)	  · Physical Assist/Nonphysical Assist	2 person assist; nonverbal cues (demo/gestures); verbal cues	  · Weight-Bearing Restrictions	full weight-bearing	  · Assistive Device	hand held assist	    Sit/Stand Transfer Safety Analysis:     · Transfer Safety Concerns Noted	decreased safety awareness; decreased balance during turns; decreased sequencing ability; decreased weight-shifting ability; in sagittal plane	  · Impairments Contributing to Impaired Transfers	impaired postural control; cognition; impaired balance; decreased strength	    Gait Skills:     · Level of McPherson	unable to perform	    Stair Negotiation:     · Level of McPherson	unable to perform	    Balance Skills Assessment:     · Sitting Balance: Static	fair balance	  · Sitting Balance: Dynamic	fair minus	  · Sit-to-Stand Balance	poor plus	  · Standing Balance: Static	fair minus	  · Standing Balance: Dynamic	poor plus	  · Systems Impairment Contributing to Balance Disturbance	musculoskeletal; neuromuscular; cognitive	  · Identified Impairments Contributing to Balance Disturbance	impaired coordination; decreased strength	    Sensory Examination:    Sensory Examination:    Grossly Intact:   · Gross Sensory Examination	to be assessed	    · Coordination Assessed	grossly impaired	      Proprioception:   · Coordination Assessed	grossly impaired	      OT 9/30  Bathing Training:     · Level of McPherson	dependent (less than 25% patients effort)	  · Physical Assist/Nonphysical Assist	1 person assist	    Upper Body Dressing Training:     · Level of McPherson	dependent (less than 25% patients effort); to don gown due to decreased cognition and lethargy	    Lower Body Dressing Training:     · Level of McPherson	dependent (less than 25% patients effort); to don socks secondary to decreased cognition	    Toilet Hygiene Training:     · Level of McPherson	dependent (less than 25% patients effort)	    Grooming Training:     · Level of McPherson	dependent (less than 25% patients effort); secondary to decreased cognition	    Eating/Self-Feeding Training:     · Level of McPherson	to be assessed	        REVIEW OF SYSTEMS - limited due to patient's current cognitive status  Respiratory - No cough,   Gastrointestinal -No vomiting, No diarrhea, No constipation  Neurological - + loss of strength,  No tremors  Skin - No itching, No rashes,       RECENT LABS/IMAGING  CBC Full  -  ( 01 Oct 2019 05:02 )  WBC Count : 7.10 K/uL  RBC Count : 3.81 M/uL  Hemoglobin : 12.1 g/dL  Hematocrit : 36.7 %  Platelet Count - Automated : 152 K/uL  Mean Cell Volume : 96.3 fl  Mean Cell Hemoglobin : 31.8 pg  Mean Cell Hemoglobin Concentration : 33.0 gm/dL  Auto Neutrophil # : 4.43 K/uL  Auto Lymphocyte # : 1.22 K/uL  Auto Monocyte # : 1.31 K/uL  Auto Eosinophil # : 0.03 K/uL  Auto Basophil # : 0.03 K/uL  Auto Neutrophil % : 62.4 %  Auto Lymphocyte % : 17.2 %  Auto Monocyte % : 18.5 %  Auto Eosinophil % : 0.4 %  Auto Basophil % : 0.4 %    10-01    142  |  106  |  16.0  ----------------------------<  114  3.2<L>   |  24.0  |  0.85    Ca    8.0<L>      01 Oct 2019 05:02  Phos  2.8     10-01  Mg     2.2     10-01      VITALS  T(C): 39.5 (10-01-19 @ 14:00), Max: 39.5 (10-01-19 @ 14:00)  HR: 78 (10-01-19 @ 14:41) (60 - 87)  BP: 152/67 (10-01-19 @ 14:00) (117/54 - 182/87)  RR: 24 (10-01-19 @ 14:00) (17 - 50)  SpO2: 95% (10-01-19 @ 14:41) (90% - 98%)  Wt(kg): --    ALLERGIES  No Known Allergies      MEDICATIONS   acetaminophen    Suspension .. 650 milliGRAM(s) Oral every 6 hours PRN  ALBUTerol    0.083% 2.5 milliGRAM(s) Nebulizer every 6 hours  chlorhexidine 2% Cloths 1 Application(s) Topical daily  enoxaparin Injectable 40 milliGRAM(s) SubCutaneous daily  melatonin Liquid 3 milliGRAM(s) Oral at bedtime  metoprolol tartrate Injectable 5 milliGRAM(s) IV Push every 6 hours  pantoprazole  Injectable 40 milliGRAM(s) IV Push daily  sodium chloride 0.9%. 1000 milliLiter(s) IV Continuous <Continuous>      ----------------------------------------------------------------------------------------  PHYSICAL EXAM  Constitutional - NAD, gurgly and wet breathing  HEENT - +EVD, EOMI, +NGT, PERRL  Neck - Supple,   Chest - +coarse upper airway sounds, no wheezing  Cardiovascular - S1S2   Abdomen - Soft, NT ND  Extremities - No C/C/E,   Neurologic Exam -                    Cognitive - eyes closed, attempts to localizes to pain with some flexion of left side, unable to follow commands     Communication - +expressive/receptive aphasia     Cranial Nerves - unable to assess     Motor -                     LEFT    UE - ShAB 3/5, EF 3/5, EE 3/5,                     RIGHT UE - ShAB 0/5, EF 2/5, EE 2/5, WE 0/5,  2/5                    LEFT    LE - HF 2/5, KE 2/5,                     RIGHT LE - HF 2/5, KE 2/5,      Sensory - unreliable at this time     Reflexes - DTR Intact,      Coordination - unable to assess     OculoVestibular - unable to assess     Balance - unable to assess    ----------------------------------------------------------------------------------------  ASSESSMENT/PLAN    84y Male with functional deficits after L basal ganglia hemorrhage    L basal ganglia hemorrhage - +EVD, as per neurosurgical management  Arousal - Recommend starting Amantadine 100mg BID at 6AM/12PM, can give first dose now; monitor for restlessness and agitation  HTN - lopressor  Sleep - Melatonin 3mg qhs, maintain good sleep hygiene to assist with daytime arousal   Pain - Tylenol  Diet - NGT for feeds, protonix  DVT PPX - SCDs, Lovenox  Rehab -   Recommend mobilization in bed at this time to prevent contractures and decubitiu.  Keep extremities elevated on pillows to assist with edema and positioning.  Frequent q2 turning of patient to prevent skin breakdown  OT to evaluate patient for b/l hand splints and b/l PRAFO to prevent contractures, assist with positioning, and prevent skin breakdown     Will continue to follow for ongoing rehab needs and recommendations.

## 2019-10-01 NOTE — PROGRESS NOTE ADULT - SUBJECTIVE AND OBJECTIVE BOX
83 yo M with PMH of HTN, presents as transfer from [facility] with L BG ICH with IVH.   Last seen normal was 10 pm last night, wife found him confused, agitated. Possible seizure.  Received Keppra and mannitol.  Of note, takes ASA 81 daily, no other AC/antiplts.  On admission, the patient was:  GCS: 11t ([id number]).  ICH score: 3  EVD placed on admission, at 10.    O/n: febrile o/n.  EVD raised to 15 last night.    LABS: reviewed.  IMAGING: Recent imaging studies were reviewed.  MEDICATIONS: reviewed.    EXAMINATION:   General:  NAD, cooperative.  HEENT:  does not open eyes, when opened with help - tries to track, PERRL.  Neuro:  lethargic but awakens briefly by verbal stimulus, follows commands, moves all extremities: seems weaker today, LUE biceps 5/5 and triceps weaker, RUE biceps 5/5 and triceps weaker; LLE 4/5, RLE 3/5.  Cards:  S1S2 present.  Respiratory:  no respiratory distress, although gurgling sounds noted again. SaO2 90% on a monitor.  Abdomen:  soft  Skin:  warm/dry

## 2019-10-01 NOTE — PROGRESS NOTE ADULT - ASSESSMENT
84y Male who is followed by neurology because of ICH    ICH  Likely hypertensive.  Avoid anti platelet medications.  Avoid anti coagulant medications.  Continue Keppra for seizure prevention.  q1h neurochecks.  Repeat head CT grossly stable.   EVD management per neurosurgery.  PT/OT when tolerated    Hypertension   Control blood pressure.     Case discussed with SICU team (Dr Faust attending).

## 2019-10-01 NOTE — PROGRESS NOTE ADULT - ASSESSMENT
Assessment:  85 yo M with h/o HTN, presents with LBG ICH/IVH, possible seizures. Etiology - like HTN. CTA w/o signs of vascular pathology.  S/p R EVD, increased @20 now, low ICPs.    Plan:  -please, continue neurochecks q1 hr in ICU settings - hydro watch  -raised EVD to 20 - will repeat CTh in am tomorrow, earlier if any change in neuro exam  -please, maintain normotension  -would recommend the following TBF goals: ICP goal< 22, CPP goal 60-70  -off Keppra - possible sz on admission - if less responsive would consider EEG in addition to CTh  -monitor Na, would recommend 140-145 as a goal  -please, maintain normothermia; will follow Cx, if febrile again, will send CSF Cx  -will follow    Patient is critically ill and at high risk of death or neurologic complications due to re-bleeding, brain swelling/ compression/ herniation, cardiorespiratory failure.

## 2019-10-01 NOTE — PROGRESS NOTE ADULT - SUBJECTIVE AND OBJECTIVE BOX
Patient has been febrile, CSF sampled under sterile technique 6cc withdrawn from proximal port and sent for analysis, gram stain & cultures.   Patient tolerated procedure well

## 2019-10-02 DIAGNOSIS — Z71.89 OTHER SPECIFIED COUNSELING: ICD-10-CM

## 2019-10-02 DIAGNOSIS — J18.9 PNEUMONIA, UNSPECIFIED ORGANISM: ICD-10-CM

## 2019-10-02 LAB
ANION GAP SERPL CALC-SCNC: 10 MMOL/L — SIGNIFICANT CHANGE UP (ref 5–17)
BASOPHILS # BLD AUTO: 0.06 K/UL — SIGNIFICANT CHANGE UP (ref 0–0.2)
BASOPHILS NFR BLD AUTO: 0.8 % — SIGNIFICANT CHANGE UP (ref 0–2)
BUN SERPL-MCNC: 23 MG/DL — HIGH (ref 8–20)
CALCIUM SERPL-MCNC: 8.4 MG/DL — LOW (ref 8.6–10.2)
CHLORIDE SERPL-SCNC: 112 MMOL/L — HIGH (ref 98–107)
CO2 SERPL-SCNC: 20 MMOL/L — LOW (ref 22–29)
CREAT SERPL-MCNC: 0.62 MG/DL — SIGNIFICANT CHANGE UP (ref 0.5–1.3)
EOSINOPHIL # BLD AUTO: 0.11 K/UL — SIGNIFICANT CHANGE UP (ref 0–0.5)
EOSINOPHIL NFR BLD AUTO: 1.4 % — SIGNIFICANT CHANGE UP (ref 0–6)
GLUCOSE BLDC GLUCOMTR-MCNC: 103 MG/DL — HIGH (ref 70–99)
GLUCOSE BLDC GLUCOMTR-MCNC: 134 MG/DL — HIGH (ref 70–99)
GLUCOSE BLDC GLUCOMTR-MCNC: 135 MG/DL — HIGH (ref 70–99)
GLUCOSE BLDC GLUCOMTR-MCNC: 157 MG/DL — HIGH (ref 70–99)
GLUCOSE BLDC GLUCOMTR-MCNC: 170 MG/DL — HIGH (ref 70–99)
GLUCOSE SERPL-MCNC: 139 MG/DL — HIGH (ref 70–115)
GRAM STN FLD: SIGNIFICANT CHANGE UP
HCT VFR BLD CALC: 35.9 % — LOW (ref 39–50)
HGB BLD-MCNC: 11.8 G/DL — LOW (ref 13–17)
IMM GRANULOCYTES NFR BLD AUTO: 1.5 % — SIGNIFICANT CHANGE UP (ref 0–1.5)
LYMPHOCYTES # BLD AUTO: 1.77 K/UL — SIGNIFICANT CHANGE UP (ref 1–3.3)
LYMPHOCYTES # BLD AUTO: 22.7 % — SIGNIFICANT CHANGE UP (ref 13–44)
MAGNESIUM SERPL-MCNC: 2.2 MG/DL — SIGNIFICANT CHANGE UP (ref 1.6–2.6)
MCHC RBC-ENTMCNC: 31.2 PG — SIGNIFICANT CHANGE UP (ref 27–34)
MCHC RBC-ENTMCNC: 32.9 GM/DL — SIGNIFICANT CHANGE UP (ref 32–36)
MCV RBC AUTO: 95 FL — SIGNIFICANT CHANGE UP (ref 80–100)
MONOCYTES # BLD AUTO: 1.26 K/UL — HIGH (ref 0–0.9)
MONOCYTES NFR BLD AUTO: 16.1 % — HIGH (ref 2–14)
NEUTROPHILS # BLD AUTO: 4.49 K/UL — SIGNIFICANT CHANGE UP (ref 1.8–7.4)
NEUTROPHILS NFR BLD AUTO: 57.5 % — SIGNIFICANT CHANGE UP (ref 43–77)
PHOSPHATE SERPL-MCNC: 2.2 MG/DL — LOW (ref 2.4–4.7)
PLATELET # BLD AUTO: 146 K/UL — LOW (ref 150–400)
POTASSIUM SERPL-MCNC: 4.1 MMOL/L — SIGNIFICANT CHANGE UP (ref 3.5–5.3)
POTASSIUM SERPL-SCNC: 4.1 MMOL/L — SIGNIFICANT CHANGE UP (ref 3.5–5.3)
PROCALCITONIN SERPL-MCNC: 0.1 NG/ML — SIGNIFICANT CHANGE UP (ref 0.02–0.1)
RBC # BLD: 3.78 M/UL — LOW (ref 4.2–5.8)
RBC # FLD: 14.6 % — HIGH (ref 10.3–14.5)
SODIUM SERPL-SCNC: 142 MMOL/L — SIGNIFICANT CHANGE UP (ref 135–145)
SPECIMEN SOURCE: SIGNIFICANT CHANGE UP
WBC # BLD: 7.81 K/UL — SIGNIFICANT CHANGE UP (ref 3.8–10.5)
WBC # FLD AUTO: 7.81 K/UL — SIGNIFICANT CHANGE UP (ref 3.8–10.5)

## 2019-10-02 PROCEDURE — 71045 X-RAY EXAM CHEST 1 VIEW: CPT | Mod: 26

## 2019-10-02 PROCEDURE — 99233 SBSQ HOSP IP/OBS HIGH 50: CPT | Mod: GC

## 2019-10-02 PROCEDURE — 99291 CRITICAL CARE FIRST HOUR: CPT

## 2019-10-02 PROCEDURE — 70450 CT HEAD/BRAIN W/O DYE: CPT | Mod: 26

## 2019-10-02 PROCEDURE — 99232 SBSQ HOSP IP/OBS MODERATE 35: CPT

## 2019-10-02 PROCEDURE — 99233 SBSQ HOSP IP/OBS HIGH 50: CPT

## 2019-10-02 RX ORDER — AMANTADINE HCL 100 MG
100 CAPSULE ORAL
Refills: 0 | Status: DISCONTINUED | OUTPATIENT
Start: 2019-10-02 | End: 2019-10-04

## 2019-10-02 RX ORDER — METRONIDAZOLE 500 MG
TABLET ORAL
Refills: 0 | Status: DISCONTINUED | OUTPATIENT
Start: 2019-10-02 | End: 2019-10-05

## 2019-10-02 RX ORDER — METOPROLOL TARTRATE 50 MG
25 TABLET ORAL EVERY 12 HOURS
Refills: 0 | Status: DISCONTINUED | OUTPATIENT
Start: 2019-10-02 | End: 2019-10-02

## 2019-10-02 RX ORDER — CEFEPIME 1 G/1
2000 INJECTION, POWDER, FOR SOLUTION INTRAMUSCULAR; INTRAVENOUS EVERY 8 HOURS
Refills: 0 | Status: DISCONTINUED | OUTPATIENT
Start: 2019-10-02 | End: 2019-10-09

## 2019-10-02 RX ORDER — VANCOMYCIN HCL 1 G
1000 VIAL (EA) INTRAVENOUS EVERY 12 HOURS
Refills: 0 | Status: DISCONTINUED | OUTPATIENT
Start: 2019-10-03 | End: 2019-10-04

## 2019-10-02 RX ORDER — TOBRAMYCIN SULFATE 40 MG/ML
300 VIAL (ML) INJECTION EVERY 12 HOURS
Refills: 0 | Status: DISCONTINUED | OUTPATIENT
Start: 2019-10-02 | End: 2019-10-09

## 2019-10-02 RX ORDER — CEFEPIME 1 G/1
2000 INJECTION, POWDER, FOR SOLUTION INTRAMUSCULAR; INTRAVENOUS ONCE
Refills: 0 | Status: COMPLETED | OUTPATIENT
Start: 2019-10-02 | End: 2019-10-02

## 2019-10-02 RX ORDER — VANCOMYCIN HCL 1 G
1250 VIAL (EA) INTRAVENOUS ONCE
Refills: 0 | Status: COMPLETED | OUTPATIENT
Start: 2019-10-02 | End: 2019-10-02

## 2019-10-02 RX ORDER — METRONIDAZOLE 500 MG
500 TABLET ORAL ONCE
Refills: 0 | Status: COMPLETED | OUTPATIENT
Start: 2019-10-02 | End: 2019-10-02

## 2019-10-02 RX ORDER — CEFEPIME 1 G/1
INJECTION, POWDER, FOR SOLUTION INTRAMUSCULAR; INTRAVENOUS
Refills: 0 | Status: DISCONTINUED | OUTPATIENT
Start: 2019-10-02 | End: 2019-10-09

## 2019-10-02 RX ORDER — VANCOMYCIN HCL 1 G
VIAL (EA) INTRAVENOUS
Refills: 0 | Status: DISCONTINUED | OUTPATIENT
Start: 2019-10-02 | End: 2019-10-02

## 2019-10-02 RX ORDER — WATER FOR INHALATION
1000 VIAL, NEBULIZER (ML) INHALATION
Refills: 0 | Status: DISCONTINUED | OUTPATIENT
Start: 2019-10-02 | End: 2019-10-04

## 2019-10-02 RX ORDER — HYDRALAZINE HCL 50 MG
10 TABLET ORAL ONCE
Refills: 0 | Status: COMPLETED | OUTPATIENT
Start: 2019-10-02 | End: 2019-10-02

## 2019-10-02 RX ORDER — METRONIDAZOLE 500 MG
500 TABLET ORAL EVERY 8 HOURS
Refills: 0 | Status: DISCONTINUED | OUTPATIENT
Start: 2019-10-02 | End: 2019-10-05

## 2019-10-02 RX ORDER — METOPROLOL TARTRATE 50 MG
25 TABLET ORAL
Refills: 0 | Status: DISCONTINUED | OUTPATIENT
Start: 2019-10-02 | End: 2019-10-03

## 2019-10-02 RX ADMIN — Medication 166.67 MILLIGRAM(S): at 12:40

## 2019-10-02 RX ADMIN — ALBUTEROL 2.5 MILLIGRAM(S): 90 AEROSOL, METERED ORAL at 14:07

## 2019-10-02 RX ADMIN — Medication 650 MILLIGRAM(S): at 00:13

## 2019-10-02 RX ADMIN — Medication 5 MILLIGRAM(S): at 11:45

## 2019-10-02 RX ADMIN — Medication 100 MILLIGRAM(S): at 12:41

## 2019-10-02 RX ADMIN — ALBUTEROL 2.5 MILLIGRAM(S): 90 AEROSOL, METERED ORAL at 08:01

## 2019-10-02 RX ADMIN — Medication 650 MILLIGRAM(S): at 05:51

## 2019-10-02 RX ADMIN — Medication 650 MILLIGRAM(S): at 23:35

## 2019-10-02 RX ADMIN — Medication 3 MILLIGRAM(S): at 21:29

## 2019-10-02 RX ADMIN — Medication 5 MILLIGRAM(S): at 05:22

## 2019-10-02 RX ADMIN — Medication 25 MILLIGRAM(S): at 23:35

## 2019-10-02 RX ADMIN — PRIMIDONE 50 MILLIGRAM(S): 250 TABLET ORAL at 17:03

## 2019-10-02 RX ADMIN — Medication 5 MILLIGRAM(S): at 17:03

## 2019-10-02 RX ADMIN — CEFEPIME 100 MILLIGRAM(S): 1 INJECTION, POWDER, FOR SOLUTION INTRAMUSCULAR; INTRAVENOUS at 21:28

## 2019-10-02 RX ADMIN — Medication 100 MILLIGRAM(S): at 05:49

## 2019-10-02 RX ADMIN — Medication 84 MILLILITER(S): at 12:40

## 2019-10-02 RX ADMIN — Medication 100 MILLIGRAM(S): at 21:29

## 2019-10-02 RX ADMIN — Medication 650 MILLIGRAM(S): at 15:37

## 2019-10-02 RX ADMIN — Medication 100 MILLIGRAM(S): at 12:56

## 2019-10-02 RX ADMIN — PRIMIDONE 50 MILLIGRAM(S): 250 TABLET ORAL at 05:49

## 2019-10-02 RX ADMIN — ALBUTEROL 2.5 MILLIGRAM(S): 90 AEROSOL, METERED ORAL at 19:36

## 2019-10-02 RX ADMIN — CHLORHEXIDINE GLUCONATE 1 APPLICATION(S): 213 SOLUTION TOPICAL at 11:42

## 2019-10-02 RX ADMIN — Medication 10 MILLIGRAM(S): at 03:20

## 2019-10-02 RX ADMIN — Medication 85 MILLIMOLE(S): at 06:19

## 2019-10-02 RX ADMIN — Medication 5 MILLIGRAM(S): at 00:13

## 2019-10-02 RX ADMIN — Medication 300 MILLIGRAM(S): at 19:40

## 2019-10-02 RX ADMIN — CEFEPIME 100 MILLIGRAM(S): 1 INJECTION, POWDER, FOR SOLUTION INTRAMUSCULAR; INTRAVENOUS at 12:41

## 2019-10-02 RX ADMIN — ENOXAPARIN SODIUM 40 MILLIGRAM(S): 100 INJECTION SUBCUTANEOUS at 11:45

## 2019-10-02 RX ADMIN — ALBUTEROL 2.5 MILLIGRAM(S): 90 AEROSOL, METERED ORAL at 04:28

## 2019-10-02 RX ADMIN — PANTOPRAZOLE SODIUM 40 MILLIGRAM(S): 20 TABLET, DELAYED RELEASE ORAL at 11:45

## 2019-10-02 NOTE — PROGRESS NOTE ADULT - ASSESSMENT
Assessment:  85 yo M with h/o HTN, presents with LBG ICH/IVH, possible seizures. Etiology - like HTN. CTA w/o signs of vascular pathology.  S/p R EVD, clamped today. ICP acceptable, minimal output.  Radiographically and clinically stable.    Plan:  -please, continue neurochecks q1 hr in ICU settings - hydro watch;   -will plan CT on Fri, 10/4; earlier if any neuro changes  -CSF sample seen: glucose WNL, Gram stain negative, will wait for CSF Cx; meanwhile started on Cefepime 2 q8hr and Vanco for HCAP - which provides empirical coverage for CNS infection, too  -please, maintain normotension  -would recommend the following TBF goals: ICP goal< 22, CPP goal 60-70, Osat >92%  -off Keppra - possible sz on admission - if less responsive would consider EEG in addition to CTh  -monitor Na, would recommend 140-145; at goal  -please, maintain normothermia  -will follow    Patient is critically ill and at high risk of death or neurologic complications due to re-bleeding, brain swelling/ compression/ herniation, cardiorespiratory failure.

## 2019-10-02 NOTE — PROGRESS NOTE ADULT - PROBLEM SELECTOR PLAN 5
Met with wife. Discussed current condition.  Discussed issues of mental status, possible inability to handle secretions, now possible PNA, maybe feeding tube if significantly dysphagic   Discussed advance directives CPR intubation mechanical ventilation risks/benefits.   She states she knows her  will " not be the man he was"  and will speak to her daughters regarding further directives.   Continued support.

## 2019-10-02 NOTE — PROGRESS NOTE ADULT - ASSESSMENT
84y Male who is followed by neurology because of ICH    ICH  Likely hypertensive.  Avoid anti platelet medications.  Avoid anti coagulant medications.  Off keppra  Repeat head CT grossly stable.   EVD clamped, management per neurosurgery.  PT/OT when tolerated    Hypertension   Control blood pressure.     Case discussed with SICU team (Dr Faust attending).     will follow with you    Oscar Malave MD PhD   [phone]

## 2019-10-02 NOTE — PROGRESS NOTE ADULT - SUBJECTIVE AND OBJECTIVE BOX
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 Spoke with patients wife about events this morning. As per wife ,  woke up normal , walked around but came back to bed. When he came into bed, his legs stiffened, he was not able to talk, rolled his legs off the bed, his body followed. His wife put a pillow under his head, he began to foam at the mouth.  No loss of urine or tongue biting. Wife called ambulance, he was taken to [facility], intubated for airway protection, cat scan results show large left basal ganglia hemorrhage. Patient was than transferred to [facility] for higher level of care.      INTERVAL HPI/OVERNIGHT EVENTS:  EVD @ 20, ICP 8-14, minimal output. Remains on oxygen     ICU Vital Signs Last 24 Hrs  T(C): 37.7 (02 Oct 2019 08:00), Max: 39.5 (01 Oct 2019 14:00)  T(F): 99.8 (02 Oct 2019 08:00), Max: 103.1 (01 Oct 2019 14:00)  HR: 67 (02 Oct 2019 10:00) (62 - 92)  BP: 148/67 (02 Oct 2019 10:00) (143/66 - 181/76)  BP(mean): 97 (02 Oct 2019 10:00) (86 - 112)  ABP: --  ABP(mean): --  RR: 24 (02 Oct 2019 10:00) (17 - 42)  SpO2: 93% (02 Oct 2019 10:00) (92% - 98%)                          11.8   7.81  )-----------( 146      ( 02 Oct 2019 04:52 )             35.9   10-02    142  |  112<H>  |  23.0<H>  ----------------------------<  139<H>  4.1   |  20.0<L>  |  0.62    Ca    8.4<L>      02 Oct 2019 04:52  Phos  2.2     10-02  Mg     2.2     10-02

## 2019-10-02 NOTE — PROGRESS NOTE ADULT - SUBJECTIVE AND OBJECTIVE BOX
INTERVAL HPI/OVERNIGHT EVENTS/SUBJECTIVE:    ICU Vital Signs Last 24 Hrs  T(C): 38.4 (02 Oct 2019 05:00), Max: 39.5 (01 Oct 2019 14:00)  T(F): 101.2 (02 Oct 2019 05:00), Max: 103.1 (01 Oct 2019 14:00)  HR: 66 (02 Oct 2019 07:00) (62 - 92)  BP: 148/65 (02 Oct 2019 07:00) (118/61 - 181/76)  BP(mean): 94 (02 Oct 2019 07:00) (81 - 117)  ABP: --  ABP(mean): --  RR: 20 (02 Oct 2019 07:00) (17 - 42)  SpO2: 96% (02 Oct 2019 07:00) (90% - 98%)      I&O's Detail    01 Oct 2019 07:01  -  02 Oct 2019 07:00  --------------------------------------------------------  IN:    Enteral Tube Flush: 560 mL    Pivot 1.5: 860 mL    sodium chloride 0.9%.: 1800 mL  Total IN: 3220 mL    OUT:    External Ventricular Device: 48 mL    Incontinent per Collection Ba mL    Indwelling Catheter - Urethral: 200 mL  Total OUT: 948 mL    Total NET: 2272 mL                MEDICATIONS  (STANDING):  ALBUTerol    0.083% 2.5 milliGRAM(s) Nebulizer every 6 hours  amantadine 100 milliGRAM(s) Oral <User Schedule>  chlorhexidine 2% Cloths 1 Application(s) Topical daily  enoxaparin Injectable 40 milliGRAM(s) SubCutaneous daily  melatonin 3 milliGRAM(s) Oral at bedtime  metoprolol tartrate Injectable 5 milliGRAM(s) IV Push every 6 hours  pantoprazole  Injectable 40 milliGRAM(s) IV Push daily  primidone 50 milliGRAM(s) Oral two times a day  sodium chloride 0.9%. 1000 milliLiter(s) (75 mL/Hr) IV Continuous <Continuous>    MEDICATIONS  (PRN):  acetaminophen    Suspension .. 650 milliGRAM(s) Oral every 6 hours PRN Temp greater or equal to 38C (100.4F)      NUTRITION/IVF:     CENTRAL LINE:  LOCATION:   DATE INSERTED:  CVP:  SCVO2:    CASTELLANOS:   DATE INSERTED:    A-LINE:    LOCATION:   DATE INSERTED:   SVV:  CO/CI:     CHEST TUBE:  LOCATION:  DATE INSERTED: OUTPUT/24 HRS:  SUCTION/WATER SEAL:     NG/OG TUBE:  DATE INSERTED:  OUTPUT/24 HRS:    MISC:     PHYSICAL EXAM:    Gen:    Eyes:    Neurological:    ENMT:    Neck:    Pulmonary:    Cardiovascular:    Gastrointestinal:    Genitourinary:    Back:    Extremities:    Skin:    Musculoskeletal:          LABS:  CBC Full  -  ( 02 Oct 2019 04:52 )  WBC Count : 7.81 K/uL  RBC Count : 3.78 M/uL  Hemoglobin : 11.8 g/dL  Hematocrit : 35.9 %  Platelet Count - Automated : 146 K/uL  Mean Cell Volume : 95.0 fl  Mean Cell Hemoglobin : 31.2 pg  Mean Cell Hemoglobin Concentration : 32.9 gm/dL  Auto Neutrophil # : 4.49 K/uL  Auto Lymphocyte # : 1.77 K/uL  Auto Monocyte # : 1.26 K/uL  Auto Eosinophil # : 0.11 K/uL  Auto Basophil # : 0.06 K/uL  Auto Neutrophil % : 57.5 %  Auto Lymphocyte % : 22.7 %  Auto Monocyte % : 16.1 %  Auto Eosinophil % : 1.4 %  Auto Basophil % : 0.8 %    10    142  |  112<H>  |  23.0<H>  ----------------------------<  139<H>  4.1   |  20.0<L>  |  0.62    Ca    8.4<L>      02 Oct 2019 04:52  Phos  2.2     10-  Mg     2.2     10-02          RECENT CULTURES:  10-01 .CSF XXXX   Few White blood cells  No organisms seen XXXX              CAPILLARY BLOOD GLUCOSE      RADIOLOGY & ADDITIONAL STUDIES:    ASSESSMENT/PLAN:  84yMale presenting with:    Neuro:    CV:    Pulm:    GI/Nutrition:    /Renal:    ID:    Lines/Tubes:    Endo:    Skin:    Proph:    Dispo:      CRITICAL CARE TIME SPENT: INTERVAL HPI/OVERNIGHT EVENTS/SUBJECTIVE:    ICU Vital Signs Last 24 Hrs  T(C): 38.4 (02 Oct 2019 05:00), Max: 39.5 (01 Oct 2019 14:00)  T(F): 101.2 (02 Oct 2019 05:00), Max: 103.1 (01 Oct 2019 14:00)  HR: 66 (02 Oct 2019 07:00) (62 - 92)  BP: 148/65 (02 Oct 2019 07:00) (118/61 - 181/76)  BP(mean): 94 (02 Oct 2019 07:00) (81 - 117)  ABP: --  ABP(mean): --  RR: 20 (02 Oct 2019 07:00) (17 - 42)  SpO2: 96% (02 Oct 2019 07:00) (90% - 98%)      I&O's Detail    01 Oct 2019 07:01  -  02 Oct 2019 07:00  --------------------------------------------------------  IN:    Enteral Tube Flush: 560 mL    Pivot 1.5: 860 mL    sodium chloride 0.9%.: 1800 mL  Total IN: 3220 mL    OUT:    External Ventricular Device: 48 mL    Incontinent per Collection Ba mL    Indwelling Catheter - Urethral: 200 mL  Total OUT: 948 mL    Total NET: 2272 mL    MEDICATIONS  (STANDING):  ALBUTerol    0.083% 2.5 milliGRAM(s) Nebulizer every 6 hours  amantadine 100 milliGRAM(s) Oral <User Schedule>  chlorhexidine 2% Cloths 1 Application(s) Topical daily  enoxaparin Injectable 40 milliGRAM(s) SubCutaneous daily  melatonin 3 milliGRAM(s) Oral at bedtime  metoprolol tartrate Injectable 5 milliGRAM(s) IV Push every 6 hours  pantoprazole  Injectable 40 milliGRAM(s) IV Push daily  primidone 50 milliGRAM(s) Oral two times a day  sodium chloride 0.9%. 1000 milliLiter(s) (75 mL/Hr) IV Continuous <Continuous>    MEDICATIONS  (PRN):  acetaminophen    Suspension .. 650 milliGRAM(s) Oral every 6 hours PRN Temp greater or equal to 38C (100.4F)      NUTRITION/IVF:     CENTRAL LINE:  LOCATION:   DATE INSERTED:  CVP:  SCVO2:    MONTEJO:   DATE INSERTED:    A-LINE:    LOCATION:   DATE INSERTED:   SVV:  CO/CI:     CHEST TUBE:  LOCATION:  DATE INSERTED: OUTPUT/24 HRS:  SUCTION/WATER SEAL:     NG/OG TUBE:  DATE INSERTED:  OUTPUT/24 HRS:    MISC:     PHYSICAL EXAM:    Gen: NAD, resting in bed (HOB >30)    Neurological: pt intermittent following commands, opens eyes intermittently, tracking.    EENMT: secretions, EVD in place @20, PERRLA, sclera anicteric, conjunctiva pink, no discharge    Neck: supple, thyroid not palpable, no LAD    Pulmonary: rales in upper bilateral fields anteriorly, on oxygen mask    Cardiovascular: NSR, no mrg, S1/S2 present, cap refill <2,     Gastrointestinal: abdomen soft, NT/ND    Extremities: no edema    Skin: warm, dry, no lesions noted      LABS:  CBC Full  -  ( 02 Oct 2019 04:52 )  WBC Count : 7.81 K/uL  RBC Count : 3.78 M/uL  Hemoglobin : 11.8 g/dL  Hematocrit : 35.9 %  Platelet Count - Automated : 146 K/uL  Mean Cell Volume : 95.0 fl  Mean Cell Hemoglobin : 31.2 pg  Mean Cell Hemoglobin Concentration : 32.9 gm/dL  Auto Neutrophil # : 4.49 K/uL  Auto Lymphocyte # : 1.77 K/uL  Auto Monocyte # : 1.26 K/uL  Auto Eosinophil # : 0.11 K/uL  Auto Basophil # : 0.06 K/uL  Auto Neutrophil % : 57.5 %  Auto Lymphocyte % : 22.7 %  Auto Monocyte % : 16.1 %  Auto Eosinophil % : 1.4 %  Auto Basophil % : 0.8 %    10-02    142  |  112<H>  |  23.0<H>  ----------------------------<  139<H>  4.1   |  20.0<L>  |  0.62    Ca    8.4<L>      02 Oct 2019 04:52  Phos  2.2     10-  Mg     2.2     10-          RECENT CULTURES:  10-01 .CSF XXXX   Few White blood cells  No organisms seen XXXX              CAPILLARY BLOOD GLUCOSE      RADIOLOGY & ADDITIONAL STUDIES:    ASSESSMENT/PLAN:  84yMale presenting with large left BGH, intraventricular extension , EVD inplace, draining CSF    Neuro: cont q1 neuro checks    CV: maintain -160 mmHg    Pulm: cont suction, supp O2    GI/Nutrition: restart tube feeds    /Renal: montejo    ID: csf cultures    Lines/Tubes: EVD, dobhoff    Endo: none    Skin: frequent movement per routine    Proph: SCD, lovenox    Dispo: SICU, high risk for neurological decline and acute pulmonary complications      CRITICAL CARE TIME SPENT: INTERVAL HPI/OVERNIGHT EVENTS/SUBJECTIVE: Continues to be febrile, Tmax 101.2, CSF sent for cultures, no organisms seen.  Continued need for oral suction, TF visualized in oropharynx, held TF.  Phos continues to be low    ICU Vital Signs Last 24 Hrs  T(C): 38.4 (02 Oct 2019 05:00), Max: 39.5 (01 Oct 2019 14:00)  T(F): 101.2 (02 Oct 2019 05:00), Max: 103.1 (01 Oct 2019 14:00)  HR: 66 (02 Oct 2019 07:00) (62 - 92)  BP: 148/65 (02 Oct 2019 07:00) (118/61 - 181/76)  BP(mean): 94 (02 Oct 2019 07:00) (81 - 117)  ABP: --  ABP(mean): --  RR: 20 (02 Oct 2019 07:00) (17 - 42)  SpO2: 96% (02 Oct 2019 07:00) (90% - 98%)      I&O's Detail    01 Oct 2019 07:01  -  02 Oct 2019 07:00  --------------------------------------------------------  IN:    Enteral Tube Flush: 560 mL    Pivot 1.5: 860 mL    sodium chloride 0.9%.: 1800 mL  Total IN: 3220 mL    OUT:    External Ventricular Device: 48 mL    Incontinent per Collection Ba mL    Indwelling Catheter - Urethral: 200 mL  Total OUT: 948 mL    Total NET: 2272 mL    MEDICATIONS  (STANDING):  ALBUTerol    0.083% 2.5 milliGRAM(s) Nebulizer every 6 hours  amantadine 100 milliGRAM(s) Oral <User Schedule>  chlorhexidine 2% Cloths 1 Application(s) Topical daily  enoxaparin Injectable 40 milliGRAM(s) SubCutaneous daily  melatonin 3 milliGRAM(s) Oral at bedtime  metoprolol tartrate Injectable 5 milliGRAM(s) IV Push every 6 hours  pantoprazole  Injectable 40 milliGRAM(s) IV Push daily  primidone 50 milliGRAM(s) Oral two times a day  sodium chloride 0.9%. 1000 milliLiter(s) (75 mL/Hr) IV Continuous <Continuous>    MEDICATIONS  (PRN):  acetaminophen    Suspension .. 650 milliGRAM(s) Oral every 6 hours PRN Temp greater or equal to 38C (100.4F)      NUTRITION/IVF:     CENTRAL LINE:  LOCATION:   DATE INSERTED:  CVP:  SCVO2:    MONTEJO:   DATE INSERTED:    A-LINE:    LOCATION:   DATE INSERTED:   SVV:  CO/CI:     CHEST TUBE:  LOCATION:  DATE INSERTED: OUTPUT/24 HRS:  SUCTION/WATER SEAL:     NG/OG TUBE:  DATE INSERTED:  OUTPUT/24 HRS:    MISC:     PHYSICAL EXAM:    Gen: NAD, resting in bed (HOB >30)    Neurological: pt intermittent following commands, opens eyes intermittently, tracking.    EENMT: secretions, EVD in place @20, PERRLA, sclera anicteric, conjunctiva pink, no discharge    Neck: supple, thyroid not palpable, no LAD    Pulmonary: rales in upper bilateral fields anteriorly, on oxygen mask    Cardiovascular: NSR, no mrg, S1/S2 present, cap refill <2,     Gastrointestinal: abdomen soft, NT/ND    Extremities: no edema    Skin: warm, dry, no lesions noted      LABS:  CBC Full  -  ( 02 Oct 2019 04:52 )  WBC Count : 7.81 K/uL  RBC Count : 3.78 M/uL  Hemoglobin : 11.8 g/dL  Hematocrit : 35.9 %  Platelet Count - Automated : 146 K/uL  Mean Cell Volume : 95.0 fl  Mean Cell Hemoglobin : 31.2 pg  Mean Cell Hemoglobin Concentration : 32.9 gm/dL  Auto Neutrophil # : 4.49 K/uL  Auto Lymphocyte # : 1.77 K/uL  Auto Monocyte # : 1.26 K/uL  Auto Eosinophil # : 0.11 K/uL  Auto Basophil # : 0.06 K/uL  Auto Neutrophil % : 57.5 %  Auto Lymphocyte % : 22.7 %  Auto Monocyte % : 16.1 %  Auto Eosinophil % : 1.4 %  Auto Basophil % : 0.8 %    10    142  |  112<H>  |  23.0<H>  ----------------------------<  139<H>  4.1   |  20.0<L>  |  0.62    Ca    8.4<L>      02 Oct 2019 04:52  Phos  2.2     10-  Mg     2.2     10-          RECENT CULTURES:  10-01 .CSF XXXX   Few White blood cells  No organisms seen XXXX              CAPILLARY BLOOD GLUCOSE      RADIOLOGY & ADDITIONAL STUDIES:    ASSESSMENT/PLAN:  84yMale presenting with large left BGH, intraventricular extension , EVD inplace, draining CSF    Neuro: cont q1 neuro checks    CV: maintain -160 mmHg    Pulm: cont suction, supp O2    GI/Nutrition: restart tube feeds    /Renal: montejo    ID: csf cultures    Lines/Tubes: EVD, dobhoff    Endo: none    Skin: frequent movement per routine    Proph: SCD, lovenox    Dispo: SICU, high risk for neurological decline and acute pulmonary complications      CRITICAL CARE TIME SPENT: INTERVAL HPI/OVERNIGHT EVENTS/SUBJECTIVE: Continues to be febrile, Tmax 101.2, CSF sent for cultures, no organisms seen.  Continued need for oral suction, TF visualized in oropharynx, held TF.  Phos continues to be low    ICU Vital Signs Last 24 Hrs  T(C): 38.4 (02 Oct 2019 05:00), Max: 39.5 (01 Oct 2019 14:00)  T(F): 101.2 (02 Oct 2019 05:00), Max: 103.1 (01 Oct 2019 14:00)  HR: 66 (02 Oct 2019 07:00) (62 - 92)  BP: 148/65 (02 Oct 2019 07:00) (118/61 - 181/76)  BP(mean): 94 (02 Oct 2019 07:00) (81 - 117)  ABP: --  ABP(mean): --  RR: 20 (02 Oct 2019 07:00) (17 - 42)  SpO2: 96% (02 Oct 2019 07:00) (90% - 98%)      I&O's Detail    01 Oct 2019 07:01  -  02 Oct 2019 07:00  --------------------------------------------------------  IN:    Enteral Tube Flush: 560 mL    Pivot 1.5: 860 mL    sodium chloride 0.9%.: 1800 mL  Total IN: 3220 mL    OUT:    External Ventricular Device: 48 mL    Incontinent per Collection Ba mL    Indwelling Catheter - Urethral: 200 mL  Total OUT: 948 mL    Total NET: 2272 mL    MEDICATIONS  (STANDING):  ALBUTerol    0.083% 2.5 milliGRAM(s) Nebulizer every 6 hours  amantadine 100 milliGRAM(s) Oral <User Schedule>  chlorhexidine 2% Cloths 1 Application(s) Topical daily  enoxaparin Injectable 40 milliGRAM(s) SubCutaneous daily  melatonin 3 milliGRAM(s) Oral at bedtime  metoprolol tartrate Injectable 5 milliGRAM(s) IV Push every 6 hours  pantoprazole  Injectable 40 milliGRAM(s) IV Push daily  primidone 50 milliGRAM(s) Oral two times a day  sodium chloride 0.9%. 1000 milliLiter(s) (75 mL/Hr) IV Continuous <Continuous>    MEDICATIONS  (PRN):  acetaminophen    Suspension .. 650 milliGRAM(s) Oral every 6 hours PRN Temp greater or equal to 38C (100.4F)      NUTRITION/IVF:     CENTRAL LINE:  LOCATION:   DATE INSERTED:  CVP:  SCVO2:    MONTEJO:   DATE INSERTED:    A-LINE:    LOCATION:   DATE INSERTED:   SVV:  CO/CI:     CHEST TUBE:  LOCATION:  DATE INSERTED: OUTPUT/24 HRS:  SUCTION/WATER SEAL:     NG/OG TUBE:  DATE INSERTED:  OUTPUT/24 HRS:    MISC:     PHYSICAL EXAM:    Gen: NAD, resting in bed (HOB >30)    Neurological: pt intermittent following commands, opens eyes intermittently, tracking.    EENMT: secretions, EVD in place @20, PERRLA, sclera anicteric, conjunctiva pink, no discharge    Neck: supple, thyroid not palpable, no LAD    Pulmonary: rhonchi in upper bilateral fields anteriorly, on oxygen mask    Cardiovascular: NSR, no mrg, S1/S2 present, cap refill <2,     Gastrointestinal: abdomen soft, NT/ND    Extremities: no edema    Skin: warm, dry, no lesions noted      LABS:  CBC Full  -  ( 02 Oct 2019 04:52 )  WBC Count : 7.81 K/uL  RBC Count : 3.78 M/uL  Hemoglobin : 11.8 g/dL  Hematocrit : 35.9 %  Platelet Count - Automated : 146 K/uL  Mean Cell Volume : 95.0 fl  Mean Cell Hemoglobin : 31.2 pg  Mean Cell Hemoglobin Concentration : 32.9 gm/dL  Auto Neutrophil # : 4.49 K/uL  Auto Lymphocyte # : 1.77 K/uL  Auto Monocyte # : 1.26 K/uL  Auto Eosinophil # : 0.11 K/uL  Auto Basophil # : 0.06 K/uL  Auto Neutrophil % : 57.5 %  Auto Lymphocyte % : 22.7 %  Auto Monocyte % : 16.1 %  Auto Eosinophil % : 1.4 %  Auto Basophil % : 0.8 %    10-02    142  |  112<H>  |  23.0<H>  ----------------------------<  139<H>  4.1   |  20.0<L>  |  0.62    Ca    8.4<L>      02 Oct 2019 04:52  Phos  2.2     10-02  Mg     2.2     10-02          RECENT CULTURES:  10-01 .CSF XXXX   Few White blood cells  No organisms seen XXXX              CAPILLARY BLOOD GLUCOSE      RADIOLOGY & ADDITIONAL STUDIES:    ASSESSMENT/PLAN:  84yMale presenting with large left BGH, intraventricular extension , EVD inplace, draining CSF    Neuro: cont q1 neuro checks    CV: maintain -160 mmHg    Pulm: cont suction, supp O2    GI/Nutrition: restart tube feeds    /Renal: montejo    ID: csf cultures    Lines/Tubes: EVD, dobhoff    Endo: none    Skin: frequent movement per routine    Proph: SCD, lovenox    Dispo: SICU, high risk for neurological decline and acute pulmonary complications      CRITICAL CARE TIME SPENT:

## 2019-10-02 NOTE — PROGRESS NOTE ADULT - SUBJECTIVE AND OBJECTIVE BOX
85 yo M with PMH of HTN, presents as transfer from [facility] [place] with L BG ICH with IVH.   Last seen normal was 10 pm last night, wife found him confused, agitated. Possible seizure.  Received Keppra and mannitol.  Of note, takes ASA 81 daily, no other AC/antiplts.  On admission, the patient was:  GCS: 11t ([id number]).  ICH score: 3  EVD placed on admission, at 10.    O/n: high fever o/n again. CSF was sent    LABS: reviewed.  IMAGING: Recent imaging studies were reviewed.  MEDICATIONS: reviewed.    EXAMINATION:   General:  NAD, cooperative.  HEENT:  does not open eyes, when opened with help, PERRL.  Neuro: awakens briefly by verbal stimulus, follows commands, moves all extremities: LUE biceps 5/5 and triceps weaker, no drift,  RUE biceps 5/5 and triceps weaker; + drift;  LLE 4/5, RLE 3/5.  Cards:  S1S2 present.  Respiratory:  no respiratory distress, although gurgling sounds noted again. SaO2 93-94% on a monitor.  Abdomen:  soft  Skin:  warm/dry

## 2019-10-02 NOTE — PROGRESS NOTE ADULT - SUBJECTIVE AND OBJECTIVE BOX
Patient seen at bedside this morning.   Patient remains with eyes closed. Noted to have improved command following compared to yesterday.   On command patient was able to show 2 fingers on LUE and lift LLE; attempts to do so with RLE but unable  Patient noted to have fevers yesterday and tachycardia.  Wetness and gurgling improved compared to yesterday, however has poor management of secretions  CT head today showed stable left basal ganglia parenchymal hemorrhage, stable intraventricular hemorrhage in the bilateral lateral ventricles. Resolution of hemorrhage in the third and fourth ventricles. stable ventricular size and unchanged small bilateral paramedian frontoparietal subarachnoid hemorrhage     CURRENT FUNCTIONAL STATUS  PT  9/30  Bed Mobility  Bed Mobility Goal Established (dd-mmm-yy): 30-Sep-2019   Bed Mobility Time Frame for Goal: 1 wk  Bed Mobility Goal: moderate assist (50% patients effort);  1-person assist;  nonverbal cues (demo/gestures);  verbal cues    Transfer Training Goal  Transfer Training Goal Established (dd-mmm-yyyy): 30-Sep-2019   Transfer Training Time Frame for Goal: 1 wk  Transfer Training Transfer Type Goal: sit to stand/stand to sit  Transfer Training [place] Level Goal: moderate assist (50% patients effort)  Transfer Training Physical Assist Level Goal: 1-person assist;  nonverbal cues (demo/gestures);  verbal cues  Transfer Training Assistive Device Goal: least restrictive device     OT 9/30  Fine Motor Coordination:     Fine Motor Coordination:   · Left Hand Thumb/Finger Opposition Skills	not tested	  · Right Hand Thumb/Finger Opposition Skills	not tested	    Bathing Training:     · Level of [place]	dependent (less than 25% patients effort)	  · Physical Assist/Nonphysical Assist	1 person assist	    Upper Body Dressing Training:     · Level of [place]	dependent (less than 25% patients effort); to don gown due to decreased cognition and lethargy	    Lower Body Dressing Training:     · Level of [place]	dependent (less than 25% patients effort); to don socks secondary to decreased cognition	    Toilet Hygiene Training:     · Level of [place]	dependent (less than 25% patients effort)	    Grooming Training:     · Level of [place]	dependent (less than 25% patients effort); secondary to decreased cognition	    Eating/Self-Feeding Training:     · Level of [place]	to be assessed	      REVIEW OF SYSTEMS - limited due to patient's current cognitive status  General - +fevers  Respiratory - No cough,   Gastrointestinal -No vomiting, No diarrhea, No constipation  Neurological - + loss of strength,  No tremors  Skin - No itching, No rashes,     RECENT LABS/IMAGING  CBC Full  -  ( 02 Oct 2019 04:52 )  WBC Count : 7.81 K/uL  RBC Count : 3.78 M/uL  Hemoglobin : 11.8 g/dL  Hematocrit : 35.9 %  Platelet Count - Automated : 146 K/uL  Mean Cell Volume : 95.0 fl  Mean Cell Hemoglobin : 31.2 pg  Mean Cell Hemoglobin Concentration : 32.9 gm/dL  Auto Neutrophil # : 4.49 K/uL  Auto Lymphocyte # : 1.77 K/uL  Auto Monocyte # : 1.26 K/uL  Auto Eosinophil # : 0.11 K/uL  Auto Basophil # : 0.06 K/uL  Auto Neutrophil % : 57.5 %  Auto Lymphocyte % : 22.7 %  Auto Monocyte % : 16.1 %  Auto Eosinophil % : 1.4 %  Auto Basophil % : 0.8 %    10-02    142  |  112<H>  |  23.0<H>  ----------------------------<  139<H>  4.1   |  20.0<L>  |  0.62    Ca    8.4<L>      02 Oct 2019 04:52  Phos  2.2     10-02  Mg     2.2     10-02    < from: CT Head No Cont (10.02.19 @ 08:48) >  1.  Stable left basal ganglia parenchymal hemorrhage.   2.  Stable intraventricular hemorrhage in the bilateral lateral   ventricles. Resolution of hemorrhage in the third and fourth ventricles.   Stable ventricular size.   3.  Small bilateral paramedian frontoparietal subarachnoid hemorrhage is   grossly unchanged.     VITALS  T(C): 38.1 (10-02-19 @ 12:00), Max: 38.9 (10-01-19 @ 16:00)  HR: 76 (10-02-19 @ 14:10) (62 - 92)  BP: 150/66 (10-02-19 @ 12:00) (143/66 - 177/77)  RR: 28 (10-02-19 @ 12:00) (19 - 36)  SpO2: 96% (10-02-19 @ 14:10) (93% - 98%)  Wt(kg): --    ALLERGIES  No Known Allergies      MEDICATIONS   acetaminophen    Suspension .. 650 milliGRAM(s) Oral every 6 hours PRN  ALBUTerol    0.083% 2.5 milliGRAM(s) Nebulizer every 6 hours  amantadine Syrup 100 milliGRAM(s) Oral <User Schedule>  cefepime   IVPB 2000 milliGRAM(s) IV Intermittent every 8 hours  cefepime   IVPB      chlorhexidine 2% Cloths 1 Application(s) Topical daily  enoxaparin Injectable 40 milliGRAM(s) SubCutaneous daily  melatonin 3 milliGRAM(s) Oral at bedtime  metoprolol tartrate Injectable 5 milliGRAM(s) IV Push every 6 hours  metroNIDAZOLE  IVPB      metroNIDAZOLE  IVPB 500 milliGRAM(s) IV Intermittent every 8 hours  pantoprazole  Injectable 40 milliGRAM(s) IV Push daily  primidone 50 milliGRAM(s) Oral two times a day  sterile water 1000 milliLiter(s) IV Continuous <Continuous>  tobramycin for Nebulization 300 milliGRAM(s) Inhalation every 12 hours      ----------------------------------------------------------------------------------------  PHYSICAL EXAM  Constitutional - NAD, improved breathing compared to yesterday  HEENT - +EVD, EOMI, +NGT, PERRL  Neck - Supple,   Chest - +coarse upper airway sounds - improved, no wheezing  Cardiovascular - S1S2   Abdomen - Soft, NT ND  Extremities - No C/C/E,   Neurologic Exam -                    Cognitive - eyes closed, able to follow very limitted commands in Serbian; pt able to show 2 fingers on LUE and elevate LLE; pt unable to open eyes when asked     Communication - +expressive/receptive aphasia     Motor -                     LEFT    UE - ShAB 3/5, EF 3/5, EE 3/5,                     RIGHT UE - ShAB 0/5, EF 2/5, EE 2/5, WE 0/5,  2/5                    LEFT    LE - HF 2/5, KE 2/5,                     RIGHT LE - HF 1/5, KE 1/5,      Sensory - unreliable at this time     Reflexes - DTR Intact,      Coordination - unable to assess     OculoVestibular - unable to assess     Balance - unable to assess    ----------------------------------------------------------------------------------------  ASSESSMENT/PLAN    84y Male with functional deficits after L basal ganglia hemorrhage    L basal ganglia hemorrhage - +EVD - clamped, as per neurosurgical management  Acute respiratory failure with hypoxia/Pneumonia - pt started on cefepime, flagyl, albuterol  Arousal - Amantadine 100mg BID at 6AM/12PM, monitor for restlessness and agitation  HTN - lopressor  Tremor? - Primidone  Sleep - Melatonin 3mg qhs, maintain good sleep hygiene to assist with daytime arousal   Pain - Tylenol  Diet - NGT for feeds, protonix  DVT PPX - SCDs, Lovenox  Rehab -   Recommend mobilization in bed at this time to prevent contractures and decubiti.  Keep extremities elevated on pillows to assist with edema and positioning.  Frequent q2 turning of patient to prevent skin breakdown  OT to evaluate patient for b/l hand splints and b/l PRAFO to prevent contractures, assist with positioning, and prevent skin breakdown     Will continue to follow for ongoing rehab needs and recommendations. Patient seen at bedside this morning.   Patient remains with eyes closed. Noted to have improved command following compared to yesterday.   On command patient was able to show 2 fingers on LUE and lift LLE; attempts to do so with RLE but unable  Patient noted to have fevers yesterday and tachycardia.  Wetness and gurgling improved compared to yesterday, however has poor management of secretions  CT head today showed stable left basal ganglia parenchymal hemorrhage, stable intraventricular hemorrhage in the bilateral lateral ventricles. Resolution of hemorrhage in the third and fourth ventricles. stable ventricular size and unchanged small bilateral paramedian frontoparietal subarachnoid hemorrhage     CURRENT FUNCTIONAL STATUS  PT  9/30  Bed Mobility  Bed Mobility Goal Established (dd-mmm-yy): 30-Sep-2019   Bed Mobility Time Frame for Goal: 1 wk  Bed Mobility Goal: moderate assist (50% patients effort);  1-person assist;  nonverbal cues (demo/gestures);  verbal cues    Transfer Training Goal  Transfer Training Goal Established (dd-mmm-yyyy): 30-Sep-2019   Transfer Training Time Frame for Goal: 1 wk  Transfer Training Transfer Type Goal: sit to stand/stand to sit  Transfer Training [place] Level Goal: moderate assist (50% patients effort)  Transfer Training Physical Assist Level Goal: 1-person assist;  nonverbal cues (demo/gestures);  verbal cues  Transfer Training Assistive Device Goal: least restrictive device     OT 9/30  Fine Motor Coordination:     Fine Motor Coordination:   · Left Hand Thumb/Finger Opposition Skills	not tested	  · Right Hand Thumb/Finger Opposition Skills	not tested	    Bathing Training:     · Level of [place]	dependent (less than 25% patients effort)	  · Physical Assist/Nonphysical Assist	1 person assist	    Upper Body Dressing Training:     · Level of [place]	dependent (less than 25% patients effort); to don gown due to decreased cognition and lethargy	    Lower Body Dressing Training:     · Level of [place]	dependent (less than 25% patients effort); to don socks secondary to decreased cognition	    Toilet Hygiene Training:     · Level of [place]	dependent (less than 25% patients effort)	    Grooming Training:     · Level of [place]	dependent (less than 25% patients effort); secondary to decreased cognition	    Eating/Self-Feeding Training:     · Level of [place]	to be assessed	      REVIEW OF SYSTEMS - limited due to patient's current cognitive status  General - +fevers  Respiratory - No cough,   Gastrointestinal -No vomiting, No diarrhea, No constipation  Neurological - + loss of strength,  No tremors  Skin - No itching, No rashes,     RECENT LABS/IMAGING  CBC Full  -  ( 02 Oct 2019 04:52 )  WBC Count : 7.81 K/uL  RBC Count : 3.78 M/uL  Hemoglobin : 11.8 g/dL  Hematocrit : 35.9 %  Platelet Count - Automated : 146 K/uL  Mean Cell Volume : 95.0 fl  Mean Cell Hemoglobin : 31.2 pg  Mean Cell Hemoglobin Concentration : 32.9 gm/dL  Auto Neutrophil # : 4.49 K/uL  Auto Lymphocyte # : 1.77 K/uL  Auto Monocyte # : 1.26 K/uL  Auto Eosinophil # : 0.11 K/uL  Auto Basophil # : 0.06 K/uL  Auto Neutrophil % : 57.5 %  Auto Lymphocyte % : 22.7 %  Auto Monocyte % : 16.1 %  Auto Eosinophil % : 1.4 %  Auto Basophil % : 0.8 %    10-02    142  |  112<H>  |  23.0<H>  ----------------------------<  139<H>  4.1   |  20.0<L>  |  0.62    Ca    8.4<L>      02 Oct 2019 04:52  Phos  2.2     10-02  Mg     2.2     10-02    < from: CT Head No Cont (10.02.19 @ 08:48) >  1.  Stable left basal ganglia parenchymal hemorrhage.   2.  Stable intraventricular hemorrhage in the bilateral lateral   ventricles. Resolution of hemorrhage in the third and fourth ventricles.   Stable ventricular size.   3.  Small bilateral paramedian frontoparietal subarachnoid hemorrhage is   grossly unchanged.     VITALS  T(C): 38.1 (10-02-19 @ 12:00), Max: 38.9 (10-01-19 @ 16:00)  HR: 76 (10-02-19 @ 14:10) (62 - 92)  BP: 150/66 (10-02-19 @ 12:00) (143/66 - 177/77)  RR: 28 (10-02-19 @ 12:00) (19 - 36)  SpO2: 96% (10-02-19 @ 14:10) (93% - 98%)  Wt(kg): --    ALLERGIES  No Known Allergies      MEDICATIONS   acetaminophen    Suspension .. 650 milliGRAM(s) Oral every 6 hours PRN  ALBUTerol    0.083% 2.5 milliGRAM(s) Nebulizer every 6 hours  amantadine Syrup 100 milliGRAM(s) Oral <User Schedule>  cefepime   IVPB 2000 milliGRAM(s) IV Intermittent every 8 hours  cefepime   IVPB      chlorhexidine 2% Cloths 1 Application(s) Topical daily  enoxaparin Injectable 40 milliGRAM(s) SubCutaneous daily  melatonin 3 milliGRAM(s) Oral at bedtime  metoprolol tartrate Injectable 5 milliGRAM(s) IV Push every 6 hours  metroNIDAZOLE  IVPB      metroNIDAZOLE  IVPB 500 milliGRAM(s) IV Intermittent every 8 hours  pantoprazole  Injectable 40 milliGRAM(s) IV Push daily  primidone 50 milliGRAM(s) Oral two times a day  sterile water 1000 milliLiter(s) IV Continuous <Continuous>  tobramycin for Nebulization 300 milliGRAM(s) Inhalation every 12 hours      ----------------------------------------------------------------------------------------  PHYSICAL EXAM  Constitutional - NAD, improved breathing compared to yesterday  HEENT - +EVD, EOMI, +NGT, PERRL  Neck - Supple,   Chest - +coarse upper airway sounds - improved, no wheezing  Cardiovascular - S1S2   Abdomen - Soft, NT ND  Extremities - No C/C/E,   Neurologic Exam -                    Cognitive - eyes closed, able to follow very limitted commands in Togolese; pt able to show 2 fingers on LUE and elevate LLE; pt unable to open eyes when asked     Communication - +expressive/receptive aphasia     Motor -                     LEFT    UE - ShAB 3/5, EF 3/5, EE 3/5,                     RIGHT UE - ShAB 0/5, EF 2/5, EE 2/5, WE 0/5,  2/5                    LEFT    LE - HF 2/5, KE 2/5,                     RIGHT LE - HF 1/5, KE 1/5,      Sensory - unreliable at this time     Reflexes - DTR Intact,      Coordination - unable to assess     OculoVestibular - unable to assess     Balance - unable to assess    ----------------------------------------------------------------------------------------  ASSESSMENT/PLAN    84y Male with functional deficits after an acute left basal ganglia hemorrhage  Left BG hemorrhage - +EVD - clamped, repeat head CT 10/4  Acute respiratory failure with hypoxia/Pneumonia - pt started on cefepime, flagyl, albuterol  Arousal - Amantadine 100mg BID at 6AM/12PM, monitor for restlessness and agitation  HTN - lopressor  Tremor? - Primidone  Sleep - Melatonin 3mg qhs, maintain good sleep hygiene to assist with daytime arousal   Pain - Tylenol  Diet - NGT for feeds, protonix  DVT PPX - SCDs, Lovenox  Rehab -   Recommend mobilization in bed at this time to prevent contractures and decubiti.  Keep extremities elevated on pillows to assist with edema and positioning.  Frequent q2 turning of patient to prevent skin breakdown  OT to evaluate patient for b/l hand splints and b/l PRAFO to prevent contractures, assist with positioning, and prevent skin breakdown     Will continue to follow for ongoing rehab needs and recommendations.

## 2019-10-02 NOTE — PROGRESS NOTE ADULT - ADDITIONAL PE
Exam: in NAD, lethargic, doesn't open eyes to voice or pain. PERRL  Motor/strength 5/5 on the left, 4/5 on the Right UE, 2/5 right LE  follows simple comands in Montenegrin, able to show 2 fingers, lifted UE & Left LE, able to wiggle toes consistently  Wound: EVD patent, c/d/i,

## 2019-10-02 NOTE — PROGRESS NOTE ADULT - SUBJECTIVE AND OBJECTIVE BOX
[facility]                                        Neurology at [place]                                 Frieda Saunders, & Jose                                  [address]                                        [place], [place], [address]                                             [phone]        CC: intracranial hemorrhage     HPI:   The patient is a 84y Male who presented as a transfer to [facility] for management of ICH.  He apparently may have had seizure and right sided weakness and was brought to [facility].  The patient was transferred to [facility] for intracranial hemorrhage management and neurosurgical eval.  He was intubated for transfer.  An extraventricular drain was placed. (27 Sep 2019 17:30).    Interim history: In SICU, aphasic    ROS:   Unobtainable due to patient's aphasia.     MEDICATIONS  (STANDING):  ALBUTerol    0.083% 2.5 milliGRAM(s) Nebulizer every 6 hours  amantadine Syrup 100 milliGRAM(s) Oral <User Schedule>  cefepime   IVPB 2000 milliGRAM(s) IV Intermittent every 8 hours  cefepime   IVPB      chlorhexidine 2% Cloths 1 Application(s) Topical daily  enoxaparin Injectable 40 milliGRAM(s) SubCutaneous daily  melatonin 3 milliGRAM(s) Oral at bedtime  metoprolol tartrate Injectable 5 milliGRAM(s) IV Push every 6 hours  metroNIDAZOLE  IVPB      metroNIDAZOLE  IVPB 500 milliGRAM(s) IV Intermittent every 8 hours  pantoprazole  Injectable 40 milliGRAM(s) IV Push daily  primidone 50 milliGRAM(s) Oral two times a day  sterile water 1000 milliLiter(s) (84 mL/Hr) IV Continuous <Continuous>  tobramycin for Nebulization 300 milliGRAM(s) Inhalation every 12 hours    MEDICATIONS  (PRN):  acetaminophen    Suspension .. 650 milliGRAM(s) Oral every 6 hours PRN Temp greater or equal to 38C (100.4F)      Vital Signs Last 24 Hrs  T(C): 38.1 (02 Oct 2019 12:00), Max: 38.9 (01 Oct 2019 16:00)  T(F): 100.6 (02 Oct 2019 12:00), Max: 102.1 (01 Oct 2019 16:00)  HR: 93 (02 Oct 2019 15:00) (62 - 93)  BP: 151/65 (02 Oct 2019 15:00) (143/66 - 177/77)  BP(mean): 94 (02 Oct 2019 15:00) (86 - 110)  RR: 27 (02 Oct 2019 15:00) (19 - 36)  SpO2: 95% (02 Oct 2019 15:00) (93% - 98%)    Detailed Neurologic Exam:    Mental status: The patient is awake but non verbal. He forces eyes shut.  Not following instructions this morning.     Cranial nerves: Pupils equal and react symmetrically to light. There is no visual field deficit to threat. Extraocular motion is difficult to assess as he does not open eyes. Facial musculature is asymmetric with central right weakness. Palate and tongue are difficult to evaluate.     Motor/sensory: There is normal bulk and tone.  There is no tremor.  Moving right minimally (2-3/5) to stimuli.  Moving left 5/5 to stimuli.     Reflexes: 1+ throughout and plantar responses are flexor left, extensor right.    Cerebellar: Cannot be assessed.     Labs:                           11.8   7.81  )-----------( 146      ( 02 Oct 2019 04:52 )             35.9     10-02    142  |  112<H>  |  23.0<H>  ----------------------------<  139<H>  4.1   |  20.0<L>  |  0.62    Ca    8.4<L>      02 Oct 2019 04:52  Phos  2.2     10-02  Mg     2.2     10-02      Rad:   CT head 10/2- stable left BG ICH/ IVH in b/l lat vents, 3/4vent ICH improved, small b/l midline frontoparietal SAH  Repeat CT 9/30 . There is stable left basal ganglia intracranial hemorrhage with slightly improved intraventricular hemorrhage. There is some new trace bilateral subarachnoid hemorrhage in the frontal/parietal regions. Ventricular size is stable.

## 2019-10-02 NOTE — PROGRESS NOTE ADULT - SUBJECTIVE AND OBJECTIVE BOX
CC:  follow up  GOC   INTERVAL HPI/OVERNIGHT EVENTS:  discussed with Dr. Faust, increased O2 , possible PNA- started on abx    PRESENT SYMPTOMS: SOURCE:  Patient/Family/Team    PAIN SCALE:  0 = none  1 = mild   2 = moderate  3 = severe    Pain:     Dyspnea:  [ x] YES [ ] NO  Anxiety:  [ ] YES [ x] NO  Fatigue: [ x] YES [ ] NO  Nausea: [ ] YES [ x] NO    Loss of Appetite: [ ] YES [ ] NO  na on TF  Other symptoms: __________    MEDICATIONS  (STANDING):  ALBUTerol    0.083% 2.5 milliGRAM(s) Nebulizer every 6 hours  amantadine Syrup 100 milliGRAM(s) Oral <User Schedule>  cefepime   IVPB 2000 milliGRAM(s) IV Intermittent once  cefepime   IVPB 2000 milliGRAM(s) IV Intermittent every 8 hours  cefepime   IVPB      chlorhexidine 2% Cloths 1 Application(s) Topical daily  enoxaparin Injectable 40 milliGRAM(s) SubCutaneous daily  melatonin 3 milliGRAM(s) Oral at bedtime  metoprolol tartrate Injectable 5 milliGRAM(s) IV Push every 6 hours  metroNIDAZOLE  IVPB      metroNIDAZOLE  IVPB 500 milliGRAM(s) IV Intermittent once  metroNIDAZOLE  IVPB 500 milliGRAM(s) IV Intermittent every 8 hours  pantoprazole  Injectable 40 milliGRAM(s) IV Push daily  primidone 50 milliGRAM(s) Oral two times a day  sterile water 1000 milliLiter(s) (84 mL/Hr) IV Continuous <Continuous>  tobramycin for Nebulization 300 milliGRAM(s) Inhalation every 12 hours  vancomycin  IVPB 1250 milliGRAM(s) IV Intermittent once    MEDICATIONS  (PRN):  acetaminophen    Suspension .. 650 milliGRAM(s) Oral every 6 hours PRN Temp greater or equal to 38C (100.4F)      Allergies    No Known Allergies    Intolerances    Karnofsky Performance Score/Palliative Performance Status Version 2: 20  %    Vital Signs Last 24 Hrs  T(C): 37.7 (02 Oct 2019 08:00), Max: 39.5 (01 Oct 2019 14:00)  T(F): 99.8 (02 Oct 2019 08:00), Max: 103.1 (01 Oct 2019 14:00)  HR: 72 (02 Oct 2019 11:00) (62 - 92)  BP: 170/74 (02 Oct 2019 11:00) (143/66 - 181/76)  BP(mean): 106 (02 Oct 2019 11:00) (86 - 110)  RR: 19 (02 Oct 2019 11:00) (19 - 36)  SpO2: 94% (02 Oct 2019 11:00) (93% - 98%)    PHYSICAL EXAM:    General: responsive to verbal communication  HEENT: [ x] normal  [ ] dry mouth  [ ] ET tube/trach    Lungs: [x ] comfortable [ ] tachypnea/labored breathing  [ ] excessive secretions    CV: [ x] normal  [ ] tachycardia    GI: [ x] normal  [ ] distended  [ ] tender  [ ] no BS               [ x] NGtube  : [x ] normal  [ ] incontinent  [ ] oliguria/anuria  [ ] montejo    MSK: [ ] normal  [ xweakness  [ ] edema             [ ] ambulatory  [ x] bedbound/wheelchair bound    Skin: [ ] normal  [ ] pressure ulcers- Stage_____  [ x] no rash    LABS:                        11.8   7.81  )-----------( 146      ( 02 Oct 2019 04:52 )             35.9     10-02    142  |  112<H>  |  23.0<H>  ----------------------------<  139<H>  4.1   |  20.0<L>  |  0.62    Ca    8.4<L>      02 Oct 2019 04:52  Phos  2.2     10-02  Mg     2.2     10-02          I&O's Summary    01 Oct 2019 07:01  -  02 Oct 2019 07:00  --------------------------------------------------------  IN: 3220 mL / OUT: 948 mL / NET: 2272 mL    02 Oct 2019 07:01  -  02 Oct 2019 12:18  --------------------------------------------------------  IN: 225 mL / OUT: 0 mL / NET: 225 mL        RADIOLOGY & ADDITIONAL STUDIES:  < from: CT Head No Cont (10.02.19 @ 08:48) >   EXAM:  CT BRAIN                          PROCEDURE DATE:  10/02/2019          INTERPRETATION:  CLINICAL INFORMATION: interval CT. interval CT.   ADMDIAG1: I62.9 TRANSFER/.    TECHNIQUE: Sequential axial images were obtained from the vertex to the   skull base without intravenous contrast. Coronal and sagittal   reformations were obtained.     COMPARISON: Prior CT dated 9/30/2019. CTA head and neck 9/27/2019    FINDINGS:    Right frontal approach ventriculostomy catheter with tip in the left   frontal horn. A left basal ganglia parenchymal hemorrhage is unchanged.   Intraventricular hemorrhage in the bilateral lateral ventricles is again   noted, with resolution of hemorrhage in the third and fourth ventricles.   Stable ventricular size. Small bilateral paramedian frontoparietal   subarachnoid hemorrhage is grossly unchanged. No effacement of basal   cisterns. Retrocerebellar arachnoid cyst.    There is no displaced calvarial fracture. Status post bilateral   intraocular lens implants. Mild mucosal thickening in the paranasal   sinuses. The mastoid air cells are well aerated.    IMPRESSION:   1.  Stable left basal ganglia parenchymal hemorrhage.   2.  Stable intraventricular hemorrhage in the bilateral lateral   ventricles. Resolution of hemorrhage in the third and fourth ventricles.   Stable ventricular size.   3.  Small bilateral paramedian frontoparietal subarachnoid hemorrhage is   grossly unchanged.         < end of copied text >    Thank you for the opportunity to assist with the care of this patient.   [facility] [phone].

## 2019-10-02 NOTE — PROGRESS NOTE ADULT - PROBLEM SELECTOR PLAN 4
Discussed with wife advance directives CPR/intubation and mechanical ventilation.   She will speak to her daughters.

## 2019-10-02 NOTE — PROGRESS NOTE ADULT - ASSESSMENT
85 y/o male with large left BGH, intraventricular extension , EVD placed, draining CSF, extubated.     - Case discussed w/ Dr. Rock  - EVD clamped today   - Pt with low grade temp, blood cx neg to date, CSF sent yesterday cx neg to date  - maintain normothermia   - CT scan on Friday  - recommend face tent for O2 requirements  - recommend ICP goal< 22, CPP goal 60-70  - please continue q1 hr neuro checks

## 2019-10-02 NOTE — CHART NOTE - NSCHARTNOTEFT_GEN_A_CORE
Source: Patient [ ]  Family [ ]   other [x]    Current Diet: Diet, NPO with Tube Feed:   Tube Feeding Modality: Nasogastric  Pivot 1.5 Prince  Total Volume for 24 Hours (mL): 1440  Continuous  Starting Tube Feed Rate {mL per Hour}: 30  Increase Tube Feed Rate by (mL): 10     Every 4 hours  Until Goal Tube Feed Rate (mL per Hour): 60  Tube Feed Duration (in Hours): 24  Tube Feed Start Time: 14:00  No Carb Prosource (1pkg = 15gms Protein)     Qty per Day:  3 (10-01-19 @ 14:35)    Enteral /Parenteral Nutrition: Tube feeds of goal rate of 60 ml/hr (x20 hrs) + Prostat 30 ml TID provides 1290 ml, 2100 kcal, 139g protein, 911 ml free water, and >100% of RDIs for vitamins/minerals. Additional free water per MD discretion.    Current weight:  (10/2) 188 lbs  (10/1) 186.2 lbs  (9/30) 184.9 lbs  (9/27) 187.8 lbs  No edema documented    Pertinent Medications: MEDICATIONS  (STANDING):  ALBUTerol    0.083% 2.5 milliGRAM(s) Nebulizer every 6 hours  amantadine 100 milliGRAM(s) Oral <User Schedule>  chlorhexidine 2% Cloths 1 Application(s) Topical daily  enoxaparin Injectable 40 milliGRAM(s) SubCutaneous daily  melatonin 3 milliGRAM(s) Oral at bedtime  metoprolol tartrate Injectable 5 milliGRAM(s) IV Push every 6 hours  pantoprazole  Injectable 40 milliGRAM(s) IV Push daily  primidone 50 milliGRAM(s) Oral two times a day  sodium chloride 0.9%. 1000 milliLiter(s) (75 mL/Hr) IV Continuous <Continuous>    MEDICATIONS  (PRN):  acetaminophen    Suspension .. 650 milliGRAM(s) Oral every 6 hours PRN Temp greater or equal to 38C (100.4F)    Pertinent Labs: CBC Full  -  ( 02 Oct 2019 04:52 )  WBC Count : 7.81 K/uL  RBC Count : 3.78 M/uL  Hemoglobin : 11.8 g/dL  Hematocrit : 35.9 %  Platelet Count - Automated : 146 K/uL  Mean Cell Volume : 95.0 fl  Mean Cell Hemoglobin : 31.2 pg  Mean Cell Hemoglobin Concentration : 32.9 gm/dL  Auto Neutrophil # : 4.49 K/uL  Auto Lymphocyte # : 1.77 K/uL  Auto Monocyte # : 1.26 K/uL  Auto Eosinophil # : 0.11 K/uL  Auto Basophil # : 0.06 K/uL  Auto Neutrophil % : 57.5 %  Auto Lymphocyte % : 22.7 %  Auto Monocyte % : 16.1 %  Auto Eosinophil % : 1.4 %  Auto Basophil % : 0.8 %    10-02 Na142 mmol/L Glu 139 mg/dL<H> K+ 4.1 mmol/L Cr  0.62 mg/dL BUN 23.0 mg/dL<H> Phos 2.2 mg/dL<L> Alb n/a   PAB n/a       Skin: No skin breakdown documented     Nutrition focused physical exam not conducted at this time- found signs of malnutrition [ ]absent [ ]present    Subcutaneous fat loss: [ ] Orbital fat pads region, [ ]Buccal fat region, [ ]Triceps region,  [ ]Ribs region    Muscle wasting: [ ]Temples region, [ ]Clavicle region, [ ]Shoulder region, [ ]Scapula region, [ ]Interosseous region,  [ ]thigh region, [ ]Calf region    Estimated Needs:   [x] no change since previous assessment  [ ] recalculated:     Current Nutrition Diagnosis: Pt remains at nutrition risk secondary to increased nutrient needs related to increased physiological demand for nutrient as evidenced by large left BGH, intraventricular extension, and EVD placement. Pt remains with poor mental status. Tube feeds held, aware to be restarted. Last BM 10/1 per documentation.     Recommendations:   1) Continue diet regimen as ordered. Additional free water per MD discretion.  2) Rx: vitamin c 500mg daily.  3) Obtain daily weights to monitor trends.     Monitoring and Evaluation:   [ ] PO intake [x] Tolerance to diet prescription [X] Weights  [X] Follow up per protocol [X] Labs

## 2019-10-03 DIAGNOSIS — I10 ESSENTIAL (PRIMARY) HYPERTENSION: ICD-10-CM

## 2019-10-03 DIAGNOSIS — J69.0 PNEUMONITIS DUE TO INHALATION OF FOOD AND VOMIT: ICD-10-CM

## 2019-10-03 DIAGNOSIS — R13.10 DYSPHAGIA, UNSPECIFIED: ICD-10-CM

## 2019-10-03 LAB
ANION GAP SERPL CALC-SCNC: 15 MMOL/L — SIGNIFICANT CHANGE UP (ref 5–17)
BASE EXCESS BLDA CALC-SCNC: 5.8 MMOL/L — HIGH (ref -3–3)
BASOPHILS # BLD AUTO: 0.04 K/UL — SIGNIFICANT CHANGE UP (ref 0–0.2)
BASOPHILS NFR BLD AUTO: 0.4 % — SIGNIFICANT CHANGE UP (ref 0–2)
BLOOD GAS COMMENTS ARTERIAL: SIGNIFICANT CHANGE UP
BUN SERPL-MCNC: 24 MG/DL — HIGH (ref 8–20)
CALCIUM SERPL-MCNC: 8.2 MG/DL — LOW (ref 8.6–10.2)
CHLORIDE SERPL-SCNC: 101 MMOL/L — SIGNIFICANT CHANGE UP (ref 98–107)
CO2 SERPL-SCNC: 23 MMOL/L — SIGNIFICANT CHANGE UP (ref 22–29)
CREAT SERPL-MCNC: 0.84 MG/DL — SIGNIFICANT CHANGE UP (ref 0.5–1.3)
EOSINOPHIL # BLD AUTO: 0.06 K/UL — SIGNIFICANT CHANGE UP (ref 0–0.5)
EOSINOPHIL NFR BLD AUTO: 0.6 % — SIGNIFICANT CHANGE UP (ref 0–6)
GAS PNL BLDA: SIGNIFICANT CHANGE UP
GLUCOSE BLDC GLUCOMTR-MCNC: 129 MG/DL — HIGH (ref 70–99)
GLUCOSE BLDC GLUCOMTR-MCNC: 146 MG/DL — HIGH (ref 70–99)
GLUCOSE SERPL-MCNC: 151 MG/DL — HIGH (ref 70–115)
HCO3 BLDA-SCNC: 30 MMOL/L — HIGH (ref 20–26)
HCT VFR BLD CALC: 34 % — LOW (ref 39–50)
HGB BLD-MCNC: 11.1 G/DL — LOW (ref 13–17)
HOROWITZ INDEX BLDA+IHG-RTO: 40 — SIGNIFICANT CHANGE UP
IMM GRANULOCYTES NFR BLD AUTO: 1.2 % — SIGNIFICANT CHANGE UP (ref 0–1.5)
LYMPHOCYTES # BLD AUTO: 2.24 K/UL — SIGNIFICANT CHANGE UP (ref 1–3.3)
LYMPHOCYTES # BLD AUTO: 23.2 % — SIGNIFICANT CHANGE UP (ref 13–44)
MAGNESIUM SERPL-MCNC: 2.1 MG/DL — SIGNIFICANT CHANGE UP (ref 1.6–2.6)
MCHC RBC-ENTMCNC: 31.2 PG — SIGNIFICANT CHANGE UP (ref 27–34)
MCHC RBC-ENTMCNC: 32.6 GM/DL — SIGNIFICANT CHANGE UP (ref 32–36)
MCV RBC AUTO: 95.5 FL — SIGNIFICANT CHANGE UP (ref 80–100)
MONOCYTES # BLD AUTO: 1.44 K/UL — HIGH (ref 0–0.9)
MONOCYTES NFR BLD AUTO: 14.9 % — HIGH (ref 2–14)
NEUTROPHILS # BLD AUTO: 5.75 K/UL — SIGNIFICANT CHANGE UP (ref 1.8–7.4)
NEUTROPHILS NFR BLD AUTO: 59.7 % — SIGNIFICANT CHANGE UP (ref 43–77)
PCO2 BLDA: 35 MMHG — SIGNIFICANT CHANGE UP (ref 35–45)
PH BLDA: 7.52 — HIGH (ref 7.35–7.45)
PHOSPHATE SERPL-MCNC: 2.4 MG/DL — SIGNIFICANT CHANGE UP (ref 2.4–4.7)
PLATELET # BLD AUTO: 158 K/UL — SIGNIFICANT CHANGE UP (ref 150–400)
PO2 BLDA: 64 MMHG — LOW (ref 83–108)
POTASSIUM SERPL-MCNC: 4 MMOL/L — SIGNIFICANT CHANGE UP (ref 3.5–5.3)
POTASSIUM SERPL-SCNC: 4 MMOL/L — SIGNIFICANT CHANGE UP (ref 3.5–5.3)
RBC # BLD: 3.56 M/UL — LOW (ref 4.2–5.8)
RBC # FLD: 14.5 % — SIGNIFICANT CHANGE UP (ref 10.3–14.5)
SAO2 % BLDA: 95 % — SIGNIFICANT CHANGE UP (ref 95–99)
SODIUM SERPL-SCNC: 139 MMOL/L — SIGNIFICANT CHANGE UP (ref 135–145)
WBC # BLD: 9.65 K/UL — SIGNIFICANT CHANGE UP (ref 3.8–10.5)
WBC # FLD AUTO: 9.65 K/UL — SIGNIFICANT CHANGE UP (ref 3.8–10.5)

## 2019-10-03 PROCEDURE — 99233 SBSQ HOSP IP/OBS HIGH 50: CPT

## 2019-10-03 PROCEDURE — 99232 SBSQ HOSP IP/OBS MODERATE 35: CPT

## 2019-10-03 PROCEDURE — 99291 CRITICAL CARE FIRST HOUR: CPT

## 2019-10-03 PROCEDURE — 71045 X-RAY EXAM CHEST 1 VIEW: CPT | Mod: 26

## 2019-10-03 RX ORDER — ACETAMINOPHEN 500 MG
650 TABLET ORAL ONCE
Refills: 0 | Status: COMPLETED | OUTPATIENT
Start: 2019-10-03 | End: 2019-10-03

## 2019-10-03 RX ORDER — LABETALOL HCL 100 MG
10 TABLET ORAL ONCE
Refills: 0 | Status: COMPLETED | OUTPATIENT
Start: 2019-10-03 | End: 2019-10-03

## 2019-10-03 RX ADMIN — Medication 650 MILLIGRAM(S): at 18:45

## 2019-10-03 RX ADMIN — ENOXAPARIN SODIUM 40 MILLIGRAM(S): 100 INJECTION SUBCUTANEOUS at 12:29

## 2019-10-03 RX ADMIN — Medication 100 MILLIGRAM(S): at 05:27

## 2019-10-03 RX ADMIN — PRIMIDONE 50 MILLIGRAM(S): 250 TABLET ORAL at 05:27

## 2019-10-03 RX ADMIN — Medication 100 MILLIGRAM(S): at 05:59

## 2019-10-03 RX ADMIN — Medication 100 MILLIGRAM(S): at 12:28

## 2019-10-03 RX ADMIN — Medication 650 MILLIGRAM(S): at 03:15

## 2019-10-03 RX ADMIN — Medication 300 MILLIGRAM(S): at 19:52

## 2019-10-03 RX ADMIN — CEFEPIME 100 MILLIGRAM(S): 1 INJECTION, POWDER, FOR SOLUTION INTRAMUSCULAR; INTRAVENOUS at 21:16

## 2019-10-03 RX ADMIN — CEFEPIME 100 MILLIGRAM(S): 1 INJECTION, POWDER, FOR SOLUTION INTRAMUSCULAR; INTRAVENOUS at 14:35

## 2019-10-03 RX ADMIN — ALBUTEROL 2.5 MILLIGRAM(S): 90 AEROSOL, METERED ORAL at 03:16

## 2019-10-03 RX ADMIN — Medication 250 MILLIGRAM(S): at 00:57

## 2019-10-03 RX ADMIN — CHLORHEXIDINE GLUCONATE 1 APPLICATION(S): 213 SOLUTION TOPICAL at 12:27

## 2019-10-03 RX ADMIN — Medication 300 MILLIGRAM(S): at 08:44

## 2019-10-03 RX ADMIN — ALBUTEROL 2.5 MILLIGRAM(S): 90 AEROSOL, METERED ORAL at 19:51

## 2019-10-03 RX ADMIN — Medication 650 MILLIGRAM(S): at 12:28

## 2019-10-03 RX ADMIN — Medication 25 MILLIGRAM(S): at 05:27

## 2019-10-03 RX ADMIN — PANTOPRAZOLE SODIUM 40 MILLIGRAM(S): 20 TABLET, DELAYED RELEASE ORAL at 12:28

## 2019-10-03 RX ADMIN — Medication 100 MILLIGRAM(S): at 14:35

## 2019-10-03 RX ADMIN — ALBUTEROL 2.5 MILLIGRAM(S): 90 AEROSOL, METERED ORAL at 08:45

## 2019-10-03 RX ADMIN — Medication 100 MILLIGRAM(S): at 21:54

## 2019-10-03 RX ADMIN — CEFEPIME 100 MILLIGRAM(S): 1 INJECTION, POWDER, FOR SOLUTION INTRAMUSCULAR; INTRAVENOUS at 05:26

## 2019-10-03 RX ADMIN — Medication 3 MILLIGRAM(S): at 22:43

## 2019-10-03 RX ADMIN — Medication 10 MILLIGRAM(S): at 09:45

## 2019-10-03 RX ADMIN — PRIMIDONE 50 MILLIGRAM(S): 250 TABLET ORAL at 18:06

## 2019-10-03 RX ADMIN — Medication 84 MILLILITER(S): at 03:54

## 2019-10-03 RX ADMIN — Medication 250 MILLIGRAM(S): at 12:28

## 2019-10-03 RX ADMIN — ALBUTEROL 2.5 MILLIGRAM(S): 90 AEROSOL, METERED ORAL at 14:02

## 2019-10-03 NOTE — PROGRESS NOTE ADULT - SUBJECTIVE AND OBJECTIVE BOX
Patient seen with family at bedside and had discussion about current state and expectations for progress.  Patient progressively having increased pulmonary demands, now on mask.   Medically has elevated fevers. Concern for pulmonary source.   Maintains his eyes closed and needed assist with manual opening and was able to maintain them open for a period of time.   Demonstrating fixation and tracking. Attempting to verbalize.   Follow commands consistently but limited by weakness.     REVIEW OF SYSTEMS  Constitutional - +fever,  +fatigue  Neurological - + loss of strength    FUNCTIONAL PROGRESS  9/30  Bed Mobility: Rolling/Turning:     · Level of [place]	maximum assist (25% patients effort)	  · Physical Assist/Nonphysical Assist	2 person assist; nonverbal cues (demo/gestures); verbal cues	    Bed Mobility: Scooting/Bridging:     · Level of [place]	maximum assist (25% patients effort)	  · Physical Assist/Nonphysical Assist	2 person assist; nonverbal cues (demo/gestures); verbal cues	    Bed Mobility: Sit to Supine:     · Level of [place]	maximum assist (25% patients effort)	  · Physical Assist/Nonphysical Assist	2 person assist; nonverbal cues (demo/gestures); verbal cues	    Bed Mobility: Supine to Sit:     · Level of [place]	maximum assist (25% patients effort)	  · Physical Assist/Nonphysical Assist	2 person assist; verbal cues; nonverbal cues (demo/gestures)	    Bed Mobility Analysis:     · Bed Mobility Limitations	decreased ability to use arms for pushing/pulling; decreased ability to use legs for bridging/pushing	  · Impairments Contributing to Impaired Bed Mobility	cognition; decreased strength; impaired balance; decreased flexibility; impaired coordination	    Transfer: Sit to Stand:     · Level of [place]	maximum assist (25% patients effort)	  · Physical Assist/Nonphysical Assist	2 person assist; nonverbal cues (demo/gestures); verbal cues	  · Weight-Bearing Restrictions	full weight-bearing	  · Assistive Device	hand held assist	    Transfer: Stand to Sit:     · Level of [place]	maximum assist (25% patients effort)	  · Physical Assist/Nonphysical Assist	2 person assist; nonverbal cues (demo/gestures); verbal cues	  · Weight-Bearing Restrictions	full weight-bearing	  · Assistive Device	hand held assist	    Sit/Stand Transfer Safety Analysis:     · Transfer Safety Concerns Noted	decreased safety awareness; decreased balance during turns; decreased sequencing ability; decreased weight-shifting ability; in sagittal plane	  · Impairments Contributing to Impaired Transfers	impaired postural control; cognition; impaired balance; decreased strength	    Gait Skills:     · Level of [place]	unable to perform	    Stair Negotiation:     · Level of [place]	unable to perform	      VITALS  T(C): 39.3 (10-03-19 @ 12:00), Max: 39.3 (10-03-19 @ 12:00)  HR: 81 (10-03-19 @ 12:00) (63 - 93)  BP: 173/77 (10-03-19 @ 12:00) (133/104 - 191/83)  RR: 27 (10-03-19 @ 12:00) (19 - 39)  SpO2: 95% (10-03-19 @ 12:00) (90% - 99%)  Wt(kg): --    MEDICATIONS   acetaminophen    Suspension .. 650 milliGRAM(s) every 6 hours PRN  ALBUTerol    0.083% 2.5 milliGRAM(s) every 6 hours  amantadine Syrup 100 milliGRAM(s) <User Schedule>  cefepime   IVPB 2000 milliGRAM(s) every 8 hours  cefepime   IVPB     chlorhexidine 2% Cloths 1 Application(s) daily  enoxaparin Injectable 40 milliGRAM(s) daily  melatonin 3 milliGRAM(s) at bedtime  metroNIDAZOLE  IVPB 500 milliGRAM(s) every 8 hours  metroNIDAZOLE  IVPB     pantoprazole  Injectable 40 milliGRAM(s) daily  primidone 50 milliGRAM(s) two times a day  propranolol 20 milliGRAM(s) every 6 hours  sterile water 1000 milliLiter(s) <Continuous>  tobramycin for Nebulization 300 milliGRAM(s) every 12 hours  vancomycin  IVPB 1000 milliGRAM(s) every 12 hours      RECENT LABS - Reviewed                        11.1   9.65  )-----------( 158      ( 03 Oct 2019 04:12 )             34.0     10-03    139  |  101  |  24.0<H>  ----------------------------<  151<H>  4.0   |  23.0  |  0.84    Ca    8.2<L>      03 Oct 2019 04:12  Phos  2.4     10-03  Mg     2.1     10-03              ----------------------------------------------------------------------------------------  PHYSICAL EXAM  Constitutional - NAD, Restless/Tremors  HEENT - +EVD  Chest - O2 mask, increased breathing effort, wet voice  Extremities - mild edema BLE  Neurologic Exam -                    Cognitive - Maintains eyes opened briefly on own after manually assisted. Tracking/Fixating     Communication - Nonverbal, Able to follow simple commands with repeated cues     Motor -                     LEFT    UE - EF 3/5,  4/5                    RIGHT UE - EF 2/5,  2/5                    LEFT    LE - HF 1/5, KE 1/5                    RIGHT LE - HF 1/5, KE 1/5     ----------------------------------------------------------------------------------------  ASSESSMENT/PLAN  84y Male with functional deficits after an acute left basal ganglia hemorrhage  Left BG hemorrhage - +EVD clamped, repeat head CT 10/4  Acute respiratory failure with hypoxia/Pneumonia - Cefepime, Flagyl, Vanco, Albuterol  Arousal - Amantadine 100mg BID (10/2), Consider Provigil 100mg Q6AM   Sleep - Melatonin 3mg (10/1)  HTN - Propranolol  Tremor? - Primidone  Pain - Tylenol  Diet - NPO, +NGT   DVT PPX - SCDs, Lovenox  Rehab - Patient medically/surgically being optimized and in guarded state. Discussed with 3 daughters and wife that bleed and age as well as prolonged recovery/medical complication are complicating potential for recovery. At this time, needs total care and may need significant assist long term, and dependent on ongoing day to day progress.     Will continue to follow. Continue mobilization by staff to maintain cardiopulmonary function and prevention of secondary complications related to debility.     Discussed with ACS.

## 2019-10-03 NOTE — SPEECH LANGUAGE PATHOLOGY EVALUATION - SLP GENERAL OBSERVATIONS
Pt received & seen seated upright in bed, +eyes closed, +periods of snoring, +respiratory treatment, +wet upper airway breath sounds, 0/10 non-verbal pain scale

## 2019-10-03 NOTE — PROGRESS NOTE ADULT - ASSESSMENT
84y Male who is followed by neurology because of ICH    ICH  Likely hypertensive.  Avoid anti platelet medications.  Avoid anti coagulant medications.  Off keppra  Repeat head CT grossly stable.   EVD clamped, management per neurosurgery.  PT/OT when tolerated    Hypertension   Control blood pressure.     I had a detailed discussion with his family regarding prognosis.  He is unlikely to be independent again.  One daughter only wants him to be able to sit with his wife and watch TV, the other daughters do not seem to agree that this would be a quality of life that he would have wanted to have.  They also were told of the possiblity of reintubation and eventual need for possible trach/peg if his swallowing status reddy not improve.  They will discuss among family.    Case discussed with SICU team (Dr Faust attending).     will follow with you    Oscar Malave MD PhD   [id number]

## 2019-10-03 NOTE — SPEECH LANGUAGE PATHOLOGY EVALUATION - SLP DIAGNOSIS
Severe receptive & expressive language deficits. Unable to assess cognition at this time due to severity of communication deficits

## 2019-10-03 NOTE — PROGRESS NOTE ADULT - ASSESSMENT
83 y/o male with large left BGH, intraventricular extension, EVD placed 9/27 --> day 6, IVH improving   EVD @ 20 H2O clamped 10/2 and tolerating well w ICPs 9-15 overnight   opening eyes today   NGT fell out this AM   Speech saw and will cont to follow  fever/PNA treatment ongoing w ABX    - pt seen in AM w/ Dr. Rock/ Nsx team  - maintain normothermia   - CT scan on Friday 10/4  - recommend face tent for O2 requirements  - recommend ICP goal< 22, CPP goal 60-70  - please continue q1 hr neuro checks  - cont w chemical DVT PPx/ lovenox and b/l TCDs  - PT: acute rehab   - PMnR: following

## 2019-10-03 NOTE — PROGRESS NOTE ADULT - SUBJECTIVE AND OBJECTIVE BOX
[facility]                                        Ne[facility] at [place]                                 Frieda Saunders, & Jose                                  [address]                                        [place], [place], [address]                                             [phone]        CC: intracranial hemorrhage     HPI:   The patient is a 84y Male who presented as a transfer to [facility] for management of ICH.  He apparently may have had seizure and right sided weakness and was brought to [facility].  The patient was transferred to [facility] for intracranial hemorrhage management and neurosurgical eval.  He was intubated for transfer.  An extraventricular drain was placed. (27 Sep 2019 17:30).    Interim history: In SICU, aphasic    ROS:  Unobtainable due to patient's condition.     MEDICATIONS  (STANDING):  ALBUTerol    0.083% 2.5 milliGRAM(s) Nebulizer every 6 hours  amantadine Syrup 100 milliGRAM(s) Oral <User Schedule>  cefepime   IVPB 2000 milliGRAM(s) IV Intermittent every 8 hours  cefepime   IVPB      chlorhexidine 2% Cloths 1 Application(s) Topical daily  enoxaparin Injectable 40 milliGRAM(s) SubCutaneous daily  melatonin 3 milliGRAM(s) Oral at bedtime  metroNIDAZOLE  IVPB 500 milliGRAM(s) IV Intermittent every 8 hours  metroNIDAZOLE  IVPB      pantoprazole  Injectable 40 milliGRAM(s) IV Push daily  primidone 50 milliGRAM(s) Oral two times a day  propranolol 20 milliGRAM(s) Oral every 6 hours  sterile water 1000 milliLiter(s) (84 mL/Hr) IV Continuous <Continuous>  tobramycin for Nebulization 300 milliGRAM(s) Inhalation every 12 hours  vancomycin  IVPB 1000 milliGRAM(s) IV Intermittent every 12 hours    MEDICATIONS  (PRN):  acetaminophen    Suspension .. 650 milliGRAM(s) Oral every 6 hours PRN Temp greater or equal to 38C (100.4F)      Vital Signs Last 24 Hrs  T(C): 39.3 (03 Oct 2019 12:00), Max: 39.3 (03 Oct 2019 12:00)  T(F): 102.8 (03 Oct 2019 12:00), Max: 102.8 (03 Oct 2019 12:00)  HR: 80 (03 Oct 2019 14:03) (63 - 93)  BP: 127/82 (03 Oct 2019 14:00) (127/82 - 191/83)  BP(mean): 100 (03 Oct 2019 14:00) (89 - 119)  RR: 23 (03 Oct 2019 14:00) (19 - 39)  SpO2: 97% (03 Oct 2019 14:03) (90% - 99%)  Detailed Neurologic Exam:    Mental status: The patient is awake but non verbal. He forces eyes shut.  following simple  instructions this morning.     Cranial nerves: Pupils equal and react symmetrically to light. Extraocular motion is difficult to assess as he does not open eyes. Facial musculature is asymmetric with central right weakness. Palate and tongue are difficult to evaluate.     Motor/sensory: There is normal bulk and tone.  There is no tremor.  Moving right minimally (2/5) to stimuli.  Moving left 5/5 to stimuli.     Reflexes: 1+ throughout and plantar responses are flexor left, extensor right.    Cerebellar: Cannot be assessed.     Labs:                           11.1   9.65  )-----------( 158      ( 03 Oct 2019 04:12 )             34.0     10-03    139  |  101  |  24.0<H>  ----------------------------<  151<H>  4.0   |  23.0  |  0.84    Ca    8.2<L>      03 Oct 2019 04:12  Phos  2.4     10-03  Mg     2.1     10-03        Rad:   CT head 10/2- stable left BG ICH/ IVH in b/l lat vents, 3/4vent ICH improved, small b/l midline frontoparietal SAH  Repeat CT 9/30 . There is stable left basal ganglia intracranial hemorrhage with slightly improved intraventricular hemorrhage. There is some new trace bilateral subarachnoid hemorrhage in the frontal/parietal regions. Ventricular size is stable.

## 2019-10-03 NOTE — PROGRESS NOTE ADULT - SUBJECTIVE AND OBJECTIVE BOX
NEUROSURGERY PROGRESS NOTE:    Pt s/p EVD placement on 9/27 day # 6  no acute events overnight and ICP 9-15 overnight.   EVD clamped since yesterday AM, 4cc CSF prior clamping   pt seen in bed, + nasal trumpet/ O2 mask/ respiratory following   treated for PNA/ + sputum cultures   + NGT/ TFs  -headache reported  - Nausea / - Vomiting  + condom cath  neurochecks q1 hr       MEDICATIONS  (STANDING):  ALBUTerol    0.083% 2.5 milliGRAM(s) Nebulizer every 6 hours  amantadine Syrup 100 milliGRAM(s) Oral <User Schedule>  cefepime   IVPB 2000 milliGRAM(s) IV Intermittent every 8 hours  cefepime   IVPB      chlorhexidine 2% Cloths 1 Application(s) Topical daily  enoxaparin Injectable 40 milliGRAM(s) SubCutaneous daily  melatonin 3 milliGRAM(s) Oral at bedtime  metoprolol tartrate 25 milliGRAM(s) Oral two times a day  metroNIDAZOLE  IVPB 500 milliGRAM(s) IV Intermittent every 8 hours  metroNIDAZOLE  IVPB      pantoprazole  Injectable 40 milliGRAM(s) IV Push daily  primidone 50 milliGRAM(s) Oral two times a day  sterile water 1000 milliLiter(s) (84 mL/Hr) IV Continuous <Continuous>  tobramycin for Nebulization 300 milliGRAM(s) Inhalation every 12 hours  vancomycin  IVPB 1000 milliGRAM(s) IV Intermittent every 12 hours    MEDICATIONS  (PRN):  acetaminophen    Suspension .. 650 milliGRAM(s) Oral every 6 hours PRN Temp greater or equal to 38C (100.4F)    Allergies    No Known Allergies    Intolerances      Vital Signs Last 24 Hrs  T(C): 37.8 (03 Oct 2019 08:00), Max: 38.9 (02 Oct 2019 16:00)  T(F): 100 (03 Oct 2019 08:00), Max: 102.1 (02 Oct 2019 16:00)  HR: 68 (03 Oct 2019 08:45) (63 - 93)  BP: 162/71 (03 Oct 2019 08:00) (133/104 - 171/77)  BP(mean): 102 (03 Oct 2019 08:00) (89 - 115)  RR: 23 (03 Oct 2019 08:00) (19 - 34)  SpO2: 93% (03 Oct 2019 08:45) (90% - 99%)          PHYSICAL EXAM:  in NAD, lethargic, + EO to verbal call. PERRL b/l, FS  Motor/strength 5/5 on the left, 4/5 on the Right UE, 2/5 right LE  follows simple commands in German and English, able to show 2 fingers b/l, lifted UE & Left LE, able to wiggle toes consistently   Wound: EVD patent, c/d/i, incision prepped w chloraprep and air dried/ covered w sterile dressing, no erythema/swelling or oozing appreciated.        LABS:                        11.1   9.65  )-----------( 158      ( 03 Oct 2019 04:12 )             34.0     10-03    139  |  101  |  24.0<H>  ----------------------------<  151<H>  4.0   |  23.0  |  0.84    Ca    8.2<L>      03 Oct 2019 04:12  Phos  2.4     10-03  Mg     2.1     10-03        RADIOLOGY & ADDITIONAL TESTS:    < from: CT Head No Cont (10.02.19 @ 08:48) >   EXAM:  CT BRAIN                          PROCEDURE DATE:  10/02/2019      INTERPRETATION:  CLINICAL INFORMATION: interval CT. interval CT.  ADMDIAG1: I62.9 TRANSFER/.    TECHNIQUE: Sequential axial images were obtained from the vertex to the skull base without intravenous contrast. Coronal and sagittal reformations were obtained.     COMPARISON: Prior CT dated 9/30/2019. CTA head and neck 9/27/2019    FINDINGS:    Right frontal approach ventriculostomy catheter with tip in the left frontal horn. A left basal ganglia parenchymal hemorrhage is unchanged.   Intraventricular hemorrhage in the bilateral lateral ventricles is again noted, with resolution of hemorrhage in the third and fourth ventricles.   Stable ventricular size. Small bilateral paramedian frontoparietal subarachnoid hemorrhage is grossly unchanged. No effacement of basal cisterns. Retrocerebellar arachnoid cyst.    There is no displaced calvarial fracture. Status post bilateral intraocular lens implants. Mild mucosal thickening in the paranasal sinuses. The mastoid air cells are well aerated.    IMPRESSION:   1.  Stable left basal ganglia parenchymal hemorrhage.   2.  Stable intraventricular hemorrhage in the bilateral lateral ventricles. Resolution of hemorrhage in the third and fourth ventricles. Stable ventricular size.   3.  Small bilateral paramedian frontoparietal subarachnoid hemorrhage is grossly unchanged.     JARRELL RAMIREZ   This document has been electronically signed. Oct  2 2019  9:11AM    < end of copied text >      Time Spent with patient/ education: 15 mins

## 2019-10-03 NOTE — PROGRESS NOTE ADULT - SUBJECTIVE AND OBJECTIVE BOX
83 yo M with PMH of HTN, presents as transfer from [facility] with L BG ICH with IVH.   Last seen normal was 10 pm last night, wife found him confused, agitated. Possible seizure.  Received Keppra and mannitol.  Of note, takes ASA 81 daily, no other AC/antiplts.  On admission, the patient was:  GCS: 11t ([id number]).  ICH score: 3  EVD placed on admission, at 10.    O/n: high fever o/n again. Also, more hypoxic, required higher O2 supply.    LABS: reviewed.  IMAGING: Recent imaging studies were reviewed.  MEDICATIONS: reviewed.    EXAMINATION:   General:  NAD, cooperative.  HEENT:  opened eyes intermittently, when opened with help, PERRL, seems like L gaze preference, R facial.  Neuro: alert, awake, cannot assess orientation due to medical condition, follows commands, LUE biceps 5/5 and triceps weaker, no drift,  RUE biceps 4/5 and triceps weaker + drift;  LLE 4/5, RLE 2/5.  Cards:  S1S2 present.  Respiratory:  pathologic pattern of breathing, gurgling sounds noted again.   Abdomen:  soft  Skin:  warm/dry

## 2019-10-03 NOTE — PROGRESS NOTE ADULT - PROBLEM SELECTOR PLAN 6
Met with wife and 3 daughters.  They had an  earlier discussion with  NSICU regarding patient's condition.  Informed them though CT findings are " stable" there is injury to brain that will lead to disability.  Explained current  issue of inability to manage secretions causing hypoxia, PNA.  Family speaking with .   Daughter Maral states there is no HCP. They had tried to have father complete one, but would not. She states mother would be next surrogate and they are all involved with decision making.   Maral seems to have a some understanding of his overall outllook. She stated though the bleed is stable " the damage has been done".  Palliative Care to continue support.

## 2019-10-03 NOTE — SPEECH LANGUAGE PATHOLOGY EVALUATION - COMMENTS
As per MD note:   "84y Male with functional deficits after L basal ganglia hemorrhage" +attempted yes/no questions via head nod/thumbs up/down: pt responded x1 via thumbs up gesture, however, no further responses observed, given increased time & max multimodality input No attempts to verbalize, given models & max cues Will continue to assess Will assess, as appropriate Unable to assess: pt non-verbal

## 2019-10-03 NOTE — PROGRESS NOTE ADULT - ASSESSMENT
Assessment:  83 yo M with h/o HTN, presents with LBG ICH/IVH, possible seizures. Etiology - like HTN. CTA w/o signs of vascular pathology.  S/p R EVD, clamped 10/2. ICP acceptable <15.  Radiographically and clinically stable.  Acute hypoxic resp failure, requires O2 supplementation,  Aspiration PNA.    Plan:  -please, continue neurochecks q1 hr in ICU settings - hydro watch;   -remains clamped - possible removal tomorrow if WNL CTh and stable exam  -will plan CT on Fri, 10/4; earlier if any neuro changes  -pending CSF Cx; on Cefepime 2 q8hr and Vanco for HCAP - which provides empirical coverage for CNS infection  -please, maintain normotension  -would recommend the following goals: ICP goal< 22, CPP goal 60-70, Osat >92%  -off Keppra - possible sz on admission - if less responsive would consider EEG in addition to CTh  -monitor Na, would recommend 140-145; at goal  -as per SICU team, low threshold for intubation; high resp care requirements  -will follow  -ongoing GOC discussion with the family    Patient is critically ill and at high risk of death or neurologic complications due to re-bleeding, brain swelling/ compression/ herniation, cardiorespiratory failure.

## 2019-10-03 NOTE — PROGRESS NOTE ADULT - SUBJECTIVE AND OBJECTIVE BOX
CC:  follow up GOC  INTERVAL HPI/OVERNIGHT EVENTS:  on IV abx  PRESENT SYMPTOMS: SOURCE:  Patient/Family/Team    PAIN SCALE:  0 = none  1 = mild   2 = moderate  3 = severe    Pain:     Dyspnea:  [ x] YES on O2 mask  Anxiety:  [ ] YES [x ] NO  Fatigue: [x ] YES [ ] NO  Nausea: [ ] YES [x ] NO  Loss of Appetite: [ ] YES [ ] NO  na NPO  Other symptoms: __________    MEDICATIONS  (STANDING):  ALBUTerol    0.083% 2.5 milliGRAM(s) Nebulizer every 6 hours  amantadine Syrup 100 milliGRAM(s) Oral <User Schedule>  cefepime   IVPB 2000 milliGRAM(s) IV Intermittent every 8 hours  cefepime   IVPB      chlorhexidine 2% Cloths 1 Application(s) Topical daily  enoxaparin Injectable 40 milliGRAM(s) SubCutaneous daily  melatonin 3 milliGRAM(s) Oral at bedtime  metroNIDAZOLE  IVPB 500 milliGRAM(s) IV Intermittent every 8 hours  metroNIDAZOLE  IVPB      pantoprazole  Injectable 40 milliGRAM(s) IV Push daily  primidone 50 milliGRAM(s) Oral two times a day  propranolol 20 milliGRAM(s) Oral every 6 hours  sterile water 1000 milliLiter(s) (84 mL/Hr) IV Continuous <Continuous>  tobramycin for Nebulization 300 milliGRAM(s) Inhalation every 12 hours  vancomycin  IVPB 1000 milliGRAM(s) IV Intermittent every 12 hours    MEDICATIONS  (PRN):  acetaminophen    Suspension .. 650 milliGRAM(s) Oral every 6 hours PRN Temp greater or equal to 38C (100.4F)      Allergies    No Known Allergies    Intolerances    Karnofsky Performance Score/Palliative Performance Status Version 2: 20   %    Vital Signs Last 24 Hrs  T(C): 39.3 (03 Oct 2019 12:00), Max: 39.3 (03 Oct 2019 12:00)  T(F): 102.8 (03 Oct 2019 12:00), Max: 102.8 (03 Oct 2019 12:00)  HR: 80 (03 Oct 2019 14:03) (63 - 93)  BP: 173/77 (03 Oct 2019 12:00) (133/104 - 191/83)  BP(mean): 111 (03 Oct 2019 12:00) (89 - 119)  RR: 27 (03 Oct 2019 12:00) (19 - 39)  SpO2: 97% (03 Oct 2019 14:03) (90% - 99%)    PHYSICAL EXAM:    General: WD man NAD    HEENT: [x ] normal  [ ] dry mouth  [ ] ET tube/trach    Lungs: [x ] comfortable on O2 mask    CV: [ x] normal  [ ] tachycardia    GI: [ ] normal  [ ] distended  [ ] tender  [ ] no BS               [ ] PEG/NG/OG tube    : [ ] normal  [ ] incontinent  [ ] oliguria/anuria  [ ] montejo    MSK: [ ] normal  [ ] weakness  [ ] edema             [ ] ambulatory  [ ] bedbound/wheelchair bound    Skin: [ ] normal  [ ] pressure ulcers- Stage_____  [ ] no rash    LABS:                        11.1   9.65  )-----------( 158      ( 03 Oct 2019 04:12 )             34.0     10-03    139  |  101  |  24.0<H>  ----------------------------<  151<H>  4.0   |  23.0  |  0.84    Ca    8.2<L>      03 Oct 2019 04:12  Phos  2.4     10-03  Mg     2.1     10-03          I&O's Summary    02 Oct 2019 07:01  -  03 Oct 2019 07:00  --------------------------------------------------------  IN: 4342 mL / OUT: 1104 mL / NET: 3238 mL    03 Oct 2019 07:01  -  03 Oct 2019 14:30  --------------------------------------------------------  IN: 1718 mL / OUT: 0 mL / NET: 1718 mL        RADIOLOGY & ADDITIONAL STUDIES:    < from: CT Head No Cont (10.02.19 @ 08:48) >    IMPRESSION:   1.  Stable left basal ganglia parenchymal hemorrhage.   2.  Stable intraventricular hemorrhage in the bilateral lateral   ventricles. Resolution of hemorrhage in the third and fourth ventricles.   Stable ventricular size.   3.  Small bilateral paramedian frontoparietal subarachnoid hemorrhage is   grossly unchanged.     < end of copied text >    Thank you for the opportunity to assist with the care of this patient.   [facility] Consult Service [phone].

## 2019-10-03 NOTE — PROGRESS NOTE ADULT - SUBJECTIVE AND OBJECTIVE BOX
INTERVAL HPI/OVERNIGHT EVENTS/SUBJECTIVE: Continues to have intermittent fever < 102 F.  Procalcitonin slightly higher now.  Increased O2 demand.  Started on Abx for suspician of aspiration PNA.EVD Clamped.  ICP <20 consistently.  Still moderate-heavy secretions poorly managed by pt.  However able to be NT suctioned well.  Pt unable to participate in Hx due to AMS.      ICU Vital Signs Last 24 Hrs  T(C): 38.8 (03 Oct 2019 03:00), Max: 38.9 (02 Oct 2019 16:00)  T(F): 101.9 (03 Oct 2019 03:00), Max: 102.1 (02 Oct 2019 16:00)  HR: 69 (03 Oct 2019 03:17) (64 - 93)  BP: 166/71 (03 Oct 2019 03:00) (133/104 - 170/74)  BP(mean): 102 (03 Oct 2019 03:00) (89 - 115)  ABP: --  ABP(mean): --  RR: 21 (03 Oct 2019 03:00) (19 - 36)  SpO2: 95% (03 Oct 2019 03:17) (92% - 98%)      I&O's Detail    01 Oct 2019 07:01  -  02 Oct 2019 07:00  --------------------------------------------------------  IN:    Enteral Tube Flush: 560 mL    Pivot 1.5: 860 mL    sodium chloride 0.9%: 1800 mL    Solution: 500 mL  Total IN: 3720 mL    OUT:    External Ventricular Device: 48 mL    Incontinent per Collection Ba mL    Indwelling Catheter - Urethral: 200 mL  Total OUT: 948 mL    Total NET: 2772 mL      02 Oct 2019 07:01  -  03 Oct 2019 03:32  --------------------------------------------------------  IN:    Enteral Tube Flush: 160 mL    Pivot 1.5: 1140 mL    sodium chloride 0.9%: 300 mL    Solution: 250 mL    Solution: 100 mL    Solution: 200 mL    Solution: 250 mL    sterile water: 1260 mL  Total IN: 3660 mL    OUT:    External Ventricular Device: 4 mL    Incontinent per Collection Ba mL  Total OUT: 704 mL    Total NET: 2956 mL                MEDICATIONS  (STANDING):  ALBUTerol    0.083% 2.5 milliGRAM(s) Nebulizer every 6 hours  amantadine Syrup 100 milliGRAM(s) Oral <User Schedule>  cefepime   IVPB 2000 milliGRAM(s) IV Intermittent every 8 hours  cefepime   IVPB      chlorhexidine 2% Cloths 1 Application(s) Topical daily  enoxaparin Injectable 40 milliGRAM(s) SubCutaneous daily  melatonin 3 milliGRAM(s) Oral at bedtime  metoprolol tartrate 25 milliGRAM(s) Oral two times a day  metroNIDAZOLE  IVPB 500 milliGRAM(s) IV Intermittent every 8 hours  metroNIDAZOLE  IVPB      pantoprazole  Injectable 40 milliGRAM(s) IV Push daily  primidone 50 milliGRAM(s) Oral two times a day  sterile water 1000 milliLiter(s) (84 mL/Hr) IV Continuous <Continuous>  tobramycin for Nebulization 300 milliGRAM(s) Inhalation every 12 hours  vancomycin  IVPB 1000 milliGRAM(s) IV Intermittent every 12 hours    MEDICATIONS  (PRN):  acetaminophen    Suspension .. 650 milliGRAM(s) Oral every 6 hours PRN Temp greater or equal to 38C (100.4F)      NUTRITION/IVF: Pivot @ 60 / Sodium acetate @ 82    CASTELLANOS:   No.  Has Condom cath.    NG TUBE:  Yes    PHYSICAL EXAM:     Gen: NAD, occasional sonorous respirations.  No cyanosis, Pallor.    Eyes: PERRL ~ 3mm, EOMI,     Neurological: GCS 3/2/6. Appears to have some expressive aphasia. RUE ~3/5 strength . RLE ~2/5.  Mostly keeps eyes closed.    Neck: Supple. NT AT, FROM no pain.  No JVD. No meningeal signs    Pulmonary: NAD, CTA, = BL .      Cardiovascular:  S1, S2, No Murmurs, rubs or gallops noted.    Gastrointestinal: ND, Soft, NT.    Extremities: NT, AT, no edema, erythema or palpable cord noted.  FROM, = 2+ pulses throughout.      LABS:  CBC Full  -  ( 02 Oct 2019 04:52 )  WBC Count : 7.81 K/uL  RBC Count : 3.78 M/uL  Hemoglobin : 11.8 g/dL  Hematocrit : 35.9 %  Platelet Count - Automated : 146 K/uL  Mean Cell Volume : 95.0 fl  Mean Cell Hemoglobin : 31.2 pg  Mean Cell Hemoglobin Concentration : 32.9 gm/dL  Auto Neutrophil # : 4.49 K/uL  Auto Lymphocyte # : 1.77 K/uL  Auto Monocyte # : 1.26 K/uL  Auto Eosinophil # : 0.11 K/uL  Auto Basophil # : 0.06 K/uL  Auto Neutrophil % : 57.5 %  Auto Lymphocyte % : 22.7 %  Auto Monocyte % : 16.1 %  Auto Eosinophil % : 1.4 %  Auto Basophil % : 0.8 %    10-02    142  |  112<H>  |  23.0<H>  ----------------------------<  139<H>  4.1   |  20.0<L>  |  0.62    Ca    8.4<L>      02 Oct 2019 04:52  Phos  2.2     10-02  Mg     2.2     10-02          RECENT CULTURES:  10-02 .Sputum XXXX   Moderate White blood cells  Few Gram Negative Rods  Few Gram Positive Cocci in Pairs and Chains  Few Gram Positive Rods XXXX    10-01 .CSF XXXX   Few White blood cells  No organisms seen   No growth to date  Culture in progress              CAPILLARY BLOOD GLUCOSE      RADIOLOGY & ADDITIONAL STUDIES:    ASSESSMENT/PLAN:  84yMale presenting with: 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  Likely related to HTN.  Now with PNA likely from aspiration from pt not managing secretions well on his own.    Neurological: EVD plan per NSX.  Will CW Q 1 hr neuro checks.  Possible plan for Head CT tomorrow if remains stable.  Appreciated Neuro recs, PMR, PT.  Will get Speech eval for aphasia.  NIH Scale.    ENMT: NT Suction Q 2 hr via NPA    Pulmonary: Frequent NT Suctioning.  Albuterol Q 6 ATC.  At this time he is maintain saturation.  Will titrate O2 to keep Saturation > 94%.  If requiring high amounts of O2 or has resp difficulty then I will intubate this pt.  BiPAP is poor option since AMS and heavy secretions.  Hi Miguel NC may not work well since pt primarily breathes with mouth open.    Cardiovascular: I switched Lopressor to PO formulation.  Will maintain SBP < 180 and > 110    Gastrointestinal: CW TF at goal.      Genitourinary: Castellanos removed.  Voiding via condom cath.    Heme: Lovenox started in addition to SCD.    ID: Vanco/ Cefepime, flagyl, Tobra nebs.  This should cover aspiration PNA, HAP and possible ventriculitis.  FU Blood cx and CSF Cx    Skin: Frequent mobility and off loading to avoid skin breakdown.    Lines/ Tubes: EVD plan per NSX.    Dispo: Pt still critically ill.  At high risk of aspiration since unable to self manage secretions. Pt will require intubation if worsens and possibly even for just non-improvement.          CRITICAL CARE TIME SPENT: Critical care time spent: 61 minutes of critical care time spent providing medical care for patient's acute illness/conditions that impairs at least one vital organ system and/or poses a high risk of imminent or life threatening deterioration in the patient's condition. It includes time spent evaluating and treating the patient's acute illness as well as time spent reviewing labs, radiology, discussing goals of care with patient and/or patient's family, and discussing the case with a multidisciplinary team in an effort to prevent further life threatening deterioration or end organ damage. This time is independent of any procedures performed.

## 2019-10-04 PROBLEM — Z00.00 ENCOUNTER FOR PREVENTIVE HEALTH EXAMINATION: Status: ACTIVE | Noted: 2019-10-04

## 2019-10-04 LAB
-  AMIKACIN: SIGNIFICANT CHANGE UP
-  AMPICILLIN/SULBACTAM: SIGNIFICANT CHANGE UP
-  AMPICILLIN: SIGNIFICANT CHANGE UP
-  AZTREONAM: SIGNIFICANT CHANGE UP
-  CEFAZOLIN: SIGNIFICANT CHANGE UP
-  CEFEPIME: SIGNIFICANT CHANGE UP
-  CEFOXITIN: SIGNIFICANT CHANGE UP
-  CEFTRIAXONE: SIGNIFICANT CHANGE UP
-  CIPROFLOXACIN: SIGNIFICANT CHANGE UP
-  ERTAPENEM: SIGNIFICANT CHANGE UP
-  GENTAMICIN: SIGNIFICANT CHANGE UP
-  IMIPENEM: SIGNIFICANT CHANGE UP
-  LEVOFLOXACIN: SIGNIFICANT CHANGE UP
-  MEROPENEM: SIGNIFICANT CHANGE UP
-  PIPERACILLIN/TAZOBACTAM: SIGNIFICANT CHANGE UP
-  TOBRAMYCIN: SIGNIFICANT CHANGE UP
-  TRIMETHOPRIM/SULFAMETHOXAZOLE: SIGNIFICANT CHANGE UP
ANION GAP SERPL CALC-SCNC: 10 MMOL/L — SIGNIFICANT CHANGE UP (ref 5–17)
BASOPHILS # BLD AUTO: 0.04 K/UL — SIGNIFICANT CHANGE UP (ref 0–0.2)
BASOPHILS NFR BLD AUTO: 0.4 % — SIGNIFICANT CHANGE UP (ref 0–2)
BUN SERPL-MCNC: 25 MG/DL — HIGH (ref 8–20)
CALCIUM SERPL-MCNC: 8.1 MG/DL — LOW (ref 8.6–10.2)
CHLORIDE SERPL-SCNC: 97 MMOL/L — LOW (ref 98–107)
CO2 SERPL-SCNC: 28 MMOL/L — SIGNIFICANT CHANGE UP (ref 22–29)
CREAT SERPL-MCNC: 0.78 MG/DL — SIGNIFICANT CHANGE UP (ref 0.5–1.3)
CULTURE RESULTS: SIGNIFICANT CHANGE UP
EOSINOPHIL # BLD AUTO: 0.04 K/UL — SIGNIFICANT CHANGE UP (ref 0–0.5)
EOSINOPHIL NFR BLD AUTO: 0.4 % — SIGNIFICANT CHANGE UP (ref 0–6)
GLUCOSE BLDC GLUCOMTR-MCNC: 129 MG/DL — HIGH (ref 70–99)
GLUCOSE BLDC GLUCOMTR-MCNC: 135 MG/DL — HIGH (ref 70–99)
GLUCOSE BLDC GLUCOMTR-MCNC: 151 MG/DL — HIGH (ref 70–99)
GLUCOSE BLDC GLUCOMTR-MCNC: 268 MG/DL — HIGH (ref 70–99)
GLUCOSE SERPL-MCNC: 151 MG/DL — HIGH (ref 70–115)
HCT VFR BLD CALC: 30.5 % — LOW (ref 39–50)
HGB BLD-MCNC: 10.1 G/DL — LOW (ref 13–17)
IMM GRANULOCYTES NFR BLD AUTO: 1.7 % — HIGH (ref 0–1.5)
LYMPHOCYTES # BLD AUTO: 1.39 K/UL — SIGNIFICANT CHANGE UP (ref 1–3.3)
LYMPHOCYTES # BLD AUTO: 12.5 % — LOW (ref 13–44)
MAGNESIUM SERPL-MCNC: 2.1 MG/DL — SIGNIFICANT CHANGE UP (ref 1.6–2.6)
MCHC RBC-ENTMCNC: 31.4 PG — SIGNIFICANT CHANGE UP (ref 27–34)
MCHC RBC-ENTMCNC: 33.1 GM/DL — SIGNIFICANT CHANGE UP (ref 32–36)
MCV RBC AUTO: 94.7 FL — SIGNIFICANT CHANGE UP (ref 80–100)
METHOD TYPE: SIGNIFICANT CHANGE UP
MONOCYTES # BLD AUTO: 1.39 K/UL — HIGH (ref 0–0.9)
MONOCYTES NFR BLD AUTO: 12.5 % — SIGNIFICANT CHANGE UP (ref 2–14)
NEUTROPHILS # BLD AUTO: 8.04 K/UL — HIGH (ref 1.8–7.4)
NEUTROPHILS NFR BLD AUTO: 72.5 % — SIGNIFICANT CHANGE UP (ref 43–77)
ORGANISM # SPEC MICROSCOPIC CNT: SIGNIFICANT CHANGE UP
ORGANISM # SPEC MICROSCOPIC CNT: SIGNIFICANT CHANGE UP
OSMOLALITY SERPL: 294 MOSMOL/KG — SIGNIFICANT CHANGE UP (ref 280–301)
PHOSPHATE SERPL-MCNC: 2.3 MG/DL — LOW (ref 2.4–4.7)
PLATELET # BLD AUTO: 152 K/UL — SIGNIFICANT CHANGE UP (ref 150–400)
POTASSIUM SERPL-MCNC: 4 MMOL/L — SIGNIFICANT CHANGE UP (ref 3.5–5.3)
POTASSIUM SERPL-SCNC: 4 MMOL/L — SIGNIFICANT CHANGE UP (ref 3.5–5.3)
RBC # BLD: 3.22 M/UL — LOW (ref 4.2–5.8)
RBC # FLD: 14.3 % — SIGNIFICANT CHANGE UP (ref 10.3–14.5)
SODIUM SERPL-SCNC: 135 MMOL/L — SIGNIFICANT CHANGE UP (ref 135–145)
SPECIMEN SOURCE: SIGNIFICANT CHANGE UP
VANCOMYCIN TROUGH SERPL-MCNC: 10.6 UG/ML — SIGNIFICANT CHANGE UP (ref 10–20)
WBC # BLD: 11.09 K/UL — HIGH (ref 3.8–10.5)
WBC # FLD AUTO: 11.09 K/UL — HIGH (ref 3.8–10.5)

## 2019-10-04 PROCEDURE — 70450 CT HEAD/BRAIN W/O DYE: CPT | Mod: 26

## 2019-10-04 PROCEDURE — 99233 SBSQ HOSP IP/OBS HIGH 50: CPT | Mod: GC

## 2019-10-04 PROCEDURE — 99291 CRITICAL CARE FIRST HOUR: CPT

## 2019-10-04 PROCEDURE — 99497 ADVNCD CARE PLAN 30 MIN: CPT | Mod: 25

## 2019-10-04 PROCEDURE — 99232 SBSQ HOSP IP/OBS MODERATE 35: CPT

## 2019-10-04 PROCEDURE — 71045 X-RAY EXAM CHEST 1 VIEW: CPT | Mod: 26

## 2019-10-04 PROCEDURE — 99233 SBSQ HOSP IP/OBS HIGH 50: CPT

## 2019-10-04 RX ORDER — LIDOCAINE HCL 20 MG/ML
10 VIAL (ML) INJECTION ONCE
Refills: 0 | Status: COMPLETED | OUTPATIENT
Start: 2019-10-04 | End: 2019-10-04

## 2019-10-04 RX ORDER — ACETAMINOPHEN 500 MG
650 TABLET ORAL ONCE
Refills: 0 | Status: COMPLETED | OUTPATIENT
Start: 2019-10-04 | End: 2019-10-04

## 2019-10-04 RX ORDER — HYDRALAZINE HCL 50 MG
10 TABLET ORAL ONCE
Refills: 0 | Status: COMPLETED | OUTPATIENT
Start: 2019-10-04 | End: 2019-10-04

## 2019-10-04 RX ORDER — VANCOMYCIN HCL 1 G
1250 VIAL (EA) INTRAVENOUS
Refills: 0 | Status: DISCONTINUED | OUTPATIENT
Start: 2019-10-05 | End: 2019-10-09

## 2019-10-04 RX ORDER — MODAFINIL 200 MG/1
100 TABLET ORAL DAILY
Refills: 0 | Status: DISCONTINUED | OUTPATIENT
Start: 2019-10-04 | End: 2019-10-11

## 2019-10-04 RX ORDER — BROMOCRIPTINE MESYLATE 5 MG/1
5 CAPSULE ORAL
Refills: 0 | Status: DISCONTINUED | OUTPATIENT
Start: 2019-10-04 | End: 2019-10-14

## 2019-10-04 RX ADMIN — CEFEPIME 100 MILLIGRAM(S): 1 INJECTION, POWDER, FOR SOLUTION INTRAMUSCULAR; INTRAVENOUS at 13:00

## 2019-10-04 RX ADMIN — ALBUTEROL 2.5 MILLIGRAM(S): 90 AEROSOL, METERED ORAL at 15:17

## 2019-10-04 RX ADMIN — Medication 650 MILLIGRAM(S): at 04:41

## 2019-10-04 RX ADMIN — Medication 3 MILLIGRAM(S): at 21:52

## 2019-10-04 RX ADMIN — Medication 10 MILLIGRAM(S): at 11:00

## 2019-10-04 RX ADMIN — Medication 250 MILLIGRAM(S): at 00:22

## 2019-10-04 RX ADMIN — ALBUTEROL 2.5 MILLIGRAM(S): 90 AEROSOL, METERED ORAL at 20:26

## 2019-10-04 RX ADMIN — Medication 650 MILLIGRAM(S): at 00:23

## 2019-10-04 RX ADMIN — Medication 100 MILLIGRAM(S): at 06:23

## 2019-10-04 RX ADMIN — Medication 250 MILLIGRAM(S): at 13:40

## 2019-10-04 RX ADMIN — Medication 85 MILLIMOLE(S): at 05:53

## 2019-10-04 RX ADMIN — Medication 300 MILLIGRAM(S): at 20:37

## 2019-10-04 RX ADMIN — PRIMIDONE 50 MILLIGRAM(S): 250 TABLET ORAL at 17:39

## 2019-10-04 RX ADMIN — MODAFINIL 100 MILLIGRAM(S): 200 TABLET ORAL at 14:06

## 2019-10-04 RX ADMIN — Medication 10 MILLIGRAM(S): at 21:52

## 2019-10-04 RX ADMIN — ALBUTEROL 2.5 MILLIGRAM(S): 90 AEROSOL, METERED ORAL at 07:50

## 2019-10-04 RX ADMIN — Medication 650 MILLIGRAM(S): at 14:05

## 2019-10-04 RX ADMIN — Medication 100 MILLIGRAM(S): at 15:30

## 2019-10-04 RX ADMIN — Medication 650 MILLIGRAM(S): at 15:00

## 2019-10-04 RX ADMIN — Medication 650 MILLIGRAM(S): at 05:30

## 2019-10-04 RX ADMIN — CHLORHEXIDINE GLUCONATE 1 APPLICATION(S): 213 SOLUTION TOPICAL at 12:39

## 2019-10-04 RX ADMIN — Medication 300 MILLIGRAM(S): at 07:50

## 2019-10-04 RX ADMIN — PANTOPRAZOLE SODIUM 40 MILLIGRAM(S): 20 TABLET, DELAYED RELEASE ORAL at 12:39

## 2019-10-04 RX ADMIN — CEFEPIME 100 MILLIGRAM(S): 1 INJECTION, POWDER, FOR SOLUTION INTRAMUSCULAR; INTRAVENOUS at 05:08

## 2019-10-04 RX ADMIN — CEFEPIME 100 MILLIGRAM(S): 1 INJECTION, POWDER, FOR SOLUTION INTRAMUSCULAR; INTRAVENOUS at 21:52

## 2019-10-04 RX ADMIN — Medication 10 MILLILITER(S): at 12:39

## 2019-10-04 RX ADMIN — BROMOCRIPTINE MESYLATE 5 MILLIGRAM(S): 5 CAPSULE ORAL at 14:07

## 2019-10-04 RX ADMIN — Medication 650 MILLIGRAM(S): at 01:20

## 2019-10-04 RX ADMIN — Medication 100 MILLIGRAM(S): at 05:48

## 2019-10-04 RX ADMIN — PRIMIDONE 50 MILLIGRAM(S): 250 TABLET ORAL at 06:23

## 2019-10-04 RX ADMIN — Medication 100 MILLIGRAM(S): at 21:52

## 2019-10-04 RX ADMIN — ALBUTEROL 2.5 MILLIGRAM(S): 90 AEROSOL, METERED ORAL at 03:19

## 2019-10-04 RX ADMIN — Medication 650 MILLIGRAM(S): at 23:00

## 2019-10-04 NOTE — PROCEDURE NOTE - NSURITECHNIQUE_GU_A_CORE
A sterile drape was used to cover all adjacent areas/The catheter was appropriately lubricated/Proper hand hygiene was performed/The site was cleaned with soap/water and sterile solution (betadine)/The catheter was secured with a securement device (e.g. StatLock)/The collection bag is below the level of the patient and urinary bladder/All applicable medical record documentation is completed/Sterile gloves were worn for the duration of the procedure

## 2019-10-04 NOTE — CHART NOTE - NSCHARTNOTEFT_GEN_A_CORE
Discussed pt prognosis at length with family at bedside.  Family has decided on DNR/DNI for pt.  Family desires continued medical management, all family members presents expressed understanding of patients condition. Discussed pt prognosis at length with family at bedside.  Family has decided on DNR/DNI for pt.  Family desires continued medical management, all family members presents expressed understanding of patients condition.      Attending Attestation.     I was Physically Present for the above conversation.  Prolonged discussion with the patient's 4 children regarding the dx, progress, prognosis and plan, including my concern that the continued respiratory decline would be likely to require reintubation in the next day or two and what that would mean for potential long term recovery.  Also discussed worsening prognosis if cardiac arrest were to occur even with CPR.  All are in agreement that pt would never want to be kept alive on life support or live in a dependant manner.  As such request pt to be DNR/DNI.  Will otherwise continue aggressive care.  Understand that because of pts inability to protect his airway and presence of NG tube NPPV contraindicated and given need for very frequent NT suctioning high flow NC also will not be appropriate.

## 2019-10-04 NOTE — PROGRESS NOTE ADULT - ASSESSMENT
Assessment:  85 yo M with h/o HTN, presents with LBG ICH/IVH, possible seizures. Etiology - like HTN. CTA w/o signs of vascular pathology.  S/p R EVD, clamped 10/2. ICP acceptable <15.  Radiographically and clinically stable.  Acute hypoxic resp failure, requires O2 supplementation,  Aspiration PNA, on ABx.    Plan:  -please, continue neurochecks q1 hr in ICU settings - hydro watch;   -no clinical signs of hydro + stable CTh this am - will remove EVD   -will plan post-pull CT tomorrow in am; earlier if any clinical changes  -pending CSF Cx; on Cefepime 2 q8hr and Vanco for HCAP - which provides empirical coverage for CNS infection  -please, maintain normotension  -would recommend Osat >92%  -off Keppra - possible sz on admission - if less responsive would consider EEG in addition to CTh  -monitor Na, avoid hypoNa  -as per SICU team, low threshold for intubation; high resp care requirements  -will follow  -ongoing GOC discussion with the family    Patient is critically ill and at high risk of death or neurologic complications due to re-bleeding, brain swelling/ compression/ herniation, cardiorespiratory failure.

## 2019-10-04 NOTE — PROGRESS NOTE ADULT - SUBJECTIVE AND OBJECTIVE BOX
85 yo M with PMH of HTN, presents as transfer from [facility] with L BG ICH with IVH.   Last seen normal was 10 pm last night, wife found him confused, agitated. Possible seizure.  Received Keppra and mannitol.  Of note, takes ASA 81 daily, no other AC/antiplts.  On admission, the patient was:  GCS: 11t ([id number]).  ICH score: 3  EVD placed on admission, at 10.    O/n: high fever o/n again.   More hypoxic, required higher O2 supply.    LABS: reviewed.  IMAGING: Recent imaging studies were reviewed.  MEDICATIONS: reviewed.    EXAMINATION:   General:  NAD, cooperative.  HEENT:  opened eyes intermittently, when opened with help, PERRL, L gaze preference, R facial.  Neuro: alert, awake, cannot assess orientation due to medical condition, follows commands, weaker in general today - LUE biceps 4/5 and triceps weaker, no drift,  RUE 2/5 with biceps 3/5 and triceps weaker;  LLE 2/5, RLE 1/5.  Cards:  S1S2 present.  Respiratory:  pathologic pattern of breathing, gurgling sounds noted again.   Abdomen:  soft  Skin:  warm/dry

## 2019-10-04 NOTE — PROGRESS NOTE ADULT - PROBLEM SELECTOR PLAN 5
Discussed with daughters possibility of reintubation if O2 needs continue to increase. May need a trach thereafter  for secretion issues causing aspiration.   They state they are leaning towards DNR.  Clarified DNR directive in the case of a cardiopulmonary arrest ( no  cardiac resuscitation or intubation)  vs just DNI for respiratory difficulty/hypoxia.   Discussed potential PEG given his dysphagia

## 2019-10-04 NOTE — PROGRESS NOTE ADULT - SUBJECTIVE AND OBJECTIVE BOX
CC:  follow up GOC  INTERVAL HPI/OVERNIGHT EVENTS:  discussed with SICU - increase O2, issue of secretions  pulled out NG tube  PRESENT SYMPTOMS: SOURCE:  Patient/Family/Team    PAIN SCALE:  0 = none  1 = mild   2 = moderate  3 = severe    Pain: appears comfortable    Dyspnea:  [ x] YES [ ] NO  on O2  Anxiety:  [ ] YES [ x] NO  Fatigue: [x ] YES [ ] NO  Nausea: [ ] YES [ ] NO  Loss of Appetite: [ ] YES [ ] NO na on TF  Other symptoms: __________    MEDICATIONS  (STANDING):  ALBUTerol    0.083% 2.5 milliGRAM(s) Nebulizer every 6 hours  bromocriptine Tablet 5 milliGRAM(s) Oral <User Schedule>  cefepime   IVPB 2000 milliGRAM(s) IV Intermittent every 8 hours  cefepime   IVPB      chlorhexidine 2% Cloths 1 Application(s) Topical daily  enoxaparin Injectable 40 milliGRAM(s) SubCutaneous daily  hydrALAZINE Injectable 10 milliGRAM(s) IV Push once  lidocaine 2% Jelly 10 milliLiter(s) IntraUrethral once  melatonin 3 milliGRAM(s) Oral at bedtime  metroNIDAZOLE  IVPB 500 milliGRAM(s) IV Intermittent every 8 hours  metroNIDAZOLE  IVPB      modafinil 100 milliGRAM(s) Oral daily  pantoprazole  Injectable 40 milliGRAM(s) IV Push daily  primidone 50 milliGRAM(s) Oral two times a day  propranolol 20 milliGRAM(s) Oral every 6 hours  tobramycin for Nebulization 300 milliGRAM(s) Inhalation every 12 hours  vancomycin  IVPB 1000 milliGRAM(s) IV Intermittent every 12 hours    MEDICATIONS  (PRN):  acetaminophen    Suspension .. 650 milliGRAM(s) Oral every 6 hours PRN Temp greater or equal to 38C (100.4F)      Allergies    No Known Allergies    Intolerances    Karnofsky Performance Score/Palliative Performance Status Version 2:  30  %    Vital Signs Last 24 Hrs  T(C): 38.8 (04 Oct 2019 12:00), Max: 39.3 (04 Oct 2019 00:11)  T(F): 101.8 (04 Oct 2019 12:00), Max: 102.8 (04 Oct 2019 00:11)  HR: 64 (04 Oct 2019 11:00) (57 - 91)  BP: 184/79 (04 Oct 2019 11:00) (115/56 - 184/79)  BP(mean): 114 (04 Oct 2019 11:00) (81 - 134)  RR: 26 (04 Oct 2019 11:00) (20 - 34)  SpO2: 96% (04 Oct 2019 11:00) (94% - 99%)    PHYSICAL EXAM:    General: WD man     HEENT: [ x] normal  drain site, clean intact    Lungs: [x ] comfortable O2 mask    CV: [ x] normal  [ ] tachycardia    GI: [x ] normal  [ ] distended  [ ] tender  [ ] no BS               [ ] PEG/NG/OG tube    : [ x] normal  [ ] incontinent  [ ] oliguria/anuria  [ ] montejo    MSK: [ ] normal  [x ] weakness  [ ] edema             [ ] ambulatory  [x ] bedbound/wheelchair bound    Skin: warm, dry_  [ x] no rash    LABS:                        10.1   11.09 )-----------( 152      ( 04 Oct 2019 03:25 )             30.5     10-04    135  |  97<L>  |  25.0<H>  ----------------------------<  151<H>  4.0   |  28.0  |  0.78    Ca    8.1<L>      04 Oct 2019 03:25  Phos  2.3     10-04  Mg     2.1     10-04          I&O's Summary    03 Oct 2019 07:01  -  04 Oct 2019 07:00  --------------------------------------------------------  IN: 5022 mL / OUT: 1550 mL / NET: 3472 mL        RADIOLOGY & ADDITIONAL STUDIES:  < from: CT Head No Cont (10.04.19 @ 09:05) >   EXAM:  CT BRAIN                          PROCEDURE DATE:  10/04/2019          INTERPRETATION:  CLINICAL Indications:  Intracranial hemorrhage, follow-up    COMPARISON: CT head dated 10/2/2019    TECHNIQUE: Noncontrast CT of the head. Multiplanar reformations are   submitted.    FINDINGS:  Right frontal portion catheter is redemonstrated step in the left frontal   horn. Stable ventricular size. Improving left basal ganglia hemorrhage   with dissection into the lateral ventricles. Improved moderate   intraventricular hemorrhage. No hydrocephalus. No new hemorrhage. Stable   mild edema in left basal ganglia. Tiny scattered areas of bilateral   subarachnoid hemorrhage, unchanged.    Chronic small right basal ganglia lacunar. 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 Right-sided nasal airway is noted.      IMPRESSION: Improving left basal ganglia hemorrhage. Mildly improved   moderate intraventricular hemorrhage. Stable ventricular size. No new   hemorrhage. No hydrocephalus.      < end of copied text >        Thank you for the opportunity to assist with the care of this patient.   [facility] [phone].

## 2019-10-04 NOTE — CHART NOTE - NSCHARTNOTEFT_GEN_A_CORE
Per nurse, bladder scan >600 cc, will insert Morris for urinary retention for 3 days, reassess for retention

## 2019-10-04 NOTE — PROGRESS NOTE ADULT - SUBJECTIVE AND OBJECTIVE BOX
Patient seen at bedside with wife and daughter present  Patient remains with eyes closed; less command following today compared to prior exam; pt was able to show me 2 fingers when asked   Patient noted to have fevers, now with cooling blanket  EVD removed today s/p CT Head improving left basal ganglia hemorrhage (mildly improved), moderate intraventricular hemorrhage. Stable ventricular size. No new   hemorrhage. No hydrocephalus.      REVIEW OF SYSTEMS - limited due to cognitive status  Constitutional - +fever,  +fatigue  Neurological - + loss of strength    CURRENT FUNCTIONAL STATUS  Speech 10/3  Behavioral Exam:   · Behavioral Observations	lethargic	  · Orientation	unable to assess	  · Describe How Visual Impairment is Affecting Function	pt with eyes closed t/o assessment	    Auditory Comprehension:   · 1-step	50% accuracy given increased time without cues; 100% with cues	  · Body Part ID	70% given increased time	  · Comments	+attempted yes/no questions via head nod/thumbs up/down: pt responded x1 via thumbs up gesture, however, no further responses observed, given increased time & max multimodality input	    Verbal Expression:   · Comments	No attempts to verbalize, given models & max cues	    Non-Verbal Expressions:   · Comments	Will continue to assess	    Cognitive Linguistic Status Examination:   · Comments	Will assess, as appropriate	    Motor Speech:   · Comments	Unable to assess: pt non-verbal	    PT 10/3  Therapeutic Exercise  Therapeutic Exercise Rehab Effort: poor  Therapeutic Exercise Symptoms Noted During/After Treatment: none  Therapeutic Exercise Detail: in semifowler position Vitaly UE shoulder flexion, abduction/adduction, elbow flexion/extension, Vitaly LE hip flexion/extension, abduction/adduction, knee flexion/extension, dorsiflexion/plantarflexion 1 set 10 reps       RECENT LABS/IMAGING  CBC Full  -  ( 04 Oct 2019 03:25 )  WBC Count : 11.09 K/uL  RBC Count : 3.22 M/uL  Hemoglobin : 10.1 g/dL  Hematocrit : 30.5 %  Platelet Count - Automated : 152 K/uL  Mean Cell Volume : 94.7 fl  Mean Cell Hemoglobin : 31.4 pg  Mean Cell Hemoglobin Concentration : 33.1 gm/dL  Auto Neutrophil # : 8.04 K/uL  Auto Lymphocyte # : 1.39 K/uL  Auto Monocyte # : 1.39 K/uL  Auto Eosinophil # : 0.04 K/uL  Auto Basophil # : 0.04 K/uL  Auto Neutrophil % : 72.5 %  Auto Lymphocyte % : 12.5 %  Auto Monocyte % : 12.5 %  Auto Eosinophil % : 0.4 %  Auto Basophil % : 0.4 %    10-04    135  |  97<L>  |  25.0<H>  ----------------------------<  151<H>  4.0   |  28.0  |  0.78    Ca    8.1<L>      04 Oct 2019 03:25  Phos  2.3     10-04  Mg     2.1     10-04    < from: CT Head No Cont (10.04.19 @ 09:05) >  Improving left basal ganglia hemorrhage. Mildly improved   moderate intraventricular hemorrhage. Stable ventricular size. No new   hemorrhage. No hydrocephalus.    VITALS  T(C): 38.8 (10-04-19 @ 12:00), Max: 39.3 (10-04-19 @ 00:11)  HR: 73 (10-04-19 @ 13:00) (57 - 75)  BP: 167/74 (10-04-19 @ 13:00) (115/56 - 184/79)  RR: 29 (10-04-19 @ 13:00) (20 - 34)  SpO2: 96% (10-04-19 @ 13:00) (94% - 99%)  Wt(kg): --    ALLERGIES  No Known Allergies      MEDICATIONS   acetaminophen    Suspension .. 650 milliGRAM(s) Oral every 6 hours PRN  ALBUTerol    0.083% 2.5 milliGRAM(s) Nebulizer every 6 hours  bromocriptine Tablet 5 milliGRAM(s) Oral <User Schedule>  cefepime   IVPB 2000 milliGRAM(s) IV Intermittent every 8 hours  cefepime   IVPB      chlorhexidine 2% Cloths 1 Application(s) Topical daily  enoxaparin Injectable 40 milliGRAM(s) SubCutaneous daily  melatonin 3 milliGRAM(s) Oral at bedtime  metroNIDAZOLE  IVPB 500 milliGRAM(s) IV Intermittent every 8 hours  metroNIDAZOLE  IVPB      modafinil 100 milliGRAM(s) Oral daily  pantoprazole  Injectable 40 milliGRAM(s) IV Push daily  primidone 50 milliGRAM(s) Oral two times a day  propranolol 20 milliGRAM(s) Oral every 6 hours  tobramycin for Nebulization 300 milliGRAM(s) Inhalation every 12 hours      ----------------------------------------------------------------------------------------  PHYSICAL EXAM  Constitutional - NAD, does not open eyes  HEENT - does not open eyes, attempted to open pt eyes but resisting  Chest - remains with O2 mask for oxygen support  Abdomen - BS+, Soft, ND  Extremities - + edema in b/l LE  Neurologic Exam -                    Cognitive - does not open eyes today; intermittently poor command following today compared to prior exam     Communication - nonverbal at this time, was able to show me 2 fingers on left hand when asked     Sensory - unreliable at this time    ----------------------------------------------------------------------------------------  ASSESSMENT/PLAN    85 yo Male with functional deficits after an acute left basal ganglia hemorrhage    Left BG hemorrhage - +EVD removed after repeat CT head today  Acute respiratory failure with hypoxia/Pneumonia/Fevers - Cefepime, Flagyl, Albuterol, O2 mask for oxygen demand  Arousal - Bromocriptine 5mg BID 6AM/12PM (10/4), Amantadine 100mg BID (10/2 - d/fernando 10/4), Provigil 100mg daily (10/4)  Sleep - Melatonin 3mg (10/1)  HTN - Propranolol  Tremor? - Primidone  Pain - Tylenol  Diet - NPO, +NGT   DVT PPX - SCDs, Lovenox  Rehab - Patient medically/surgically being optimized and in guarded state. Patient continues to have intermittent fevers. Family is considering DNR/DNI.    Continue mobilization by staff to maintain cardiopulmonary function and prevention of secondary complications related to debility. Will continue to follow for ongoing rehab needs and recommendations. . Functional progress will determine ongoing rehab dispo recommendations, which may change. Patient seen at bedside with wife and daughter present  Patient remains with eyes closed; less command following today compared to prior exam; pt was able to show me 2 fingers when asked   Patient noted to have fevers, now with cooling blanket  EVD removed today s/p CT Head improving left basal ganglia hemorrhage (mildly improved), moderate intraventricular hemorrhage. Stable ventricular size. No new   hemorrhage. No hydrocephalus.      REVIEW OF SYSTEMS - limited due to cognitive status  Constitutional - +fever,  +fatigue  Neurological - + loss of strength    CURRENT FUNCTIONAL STATUS  Speech 10/3  Behavioral Exam:   · Behavioral Observations	lethargic	  · Orientation	unable to assess	  · Describe How Visual Impairment is Affecting Function	pt with eyes closed t/o assessment	    Auditory Comprehension:   · 1-step	50% accuracy given increased time without cues; 100% with cues	  · Body Part ID	70% given increased time	  · Comments	+attempted yes/no questions via head nod/thumbs up/down: pt responded x1 via thumbs up gesture, however, no further responses observed, given increased time & max multimodality input	    Verbal Expression:   · Comments	No attempts to verbalize, given models & max cues	    Non-Verbal Expressions:   · Comments	Will continue to assess	    Cognitive Linguistic Status Examination:   · Comments	Will assess, as appropriate	    Motor Speech:   · Comments	Unable to assess: pt non-verbal	    PT 10/3  Therapeutic Exercise  Therapeutic Exercise Rehab Effort: poor  Therapeutic Exercise Symptoms Noted During/After Treatment: none  Therapeutic Exercise Detail: in semifowler position Vitaly UE shoulder flexion, abduction/adduction, elbow flexion/extension, Vitaly LE hip flexion/extension, abduction/adduction, knee flexion/extension, dorsiflexion/plantarflexion 1 set 10 reps       RECENT LABS/IMAGING  CBC Full  -  ( 04 Oct 2019 03:25 )  WBC Count : 11.09 K/uL  RBC Count : 3.22 M/uL  Hemoglobin : 10.1 g/dL  Hematocrit : 30.5 %  Platelet Count - Automated : 152 K/uL  Mean Cell Volume : 94.7 fl  Mean Cell Hemoglobin : 31.4 pg  Mean Cell Hemoglobin Concentration : 33.1 gm/dL  Auto Neutrophil # : 8.04 K/uL  Auto Lymphocyte # : 1.39 K/uL  Auto Monocyte # : 1.39 K/uL  Auto Eosinophil # : 0.04 K/uL  Auto Basophil # : 0.04 K/uL  Auto Neutrophil % : 72.5 %  Auto Lymphocyte % : 12.5 %  Auto Monocyte % : 12.5 %  Auto Eosinophil % : 0.4 %  Auto Basophil % : 0.4 %    10-04    135  |  97<L>  |  25.0<H>  ----------------------------<  151<H>  4.0   |  28.0  |  0.78    Ca    8.1<L>      04 Oct 2019 03:25  Phos  2.3     10-04  Mg     2.1     10-04    < from: CT Head No Cont (10.04.19 @ 09:05) >  Improving left basal ganglia hemorrhage. Mildly improved   moderate intraventricular hemorrhage. Stable ventricular size. No new   hemorrhage. No hydrocephalus.    VITALS  T(C): 38.8 (10-04-19 @ 12:00), Max: 39.3 (10-04-19 @ 00:11)  HR: 73 (10-04-19 @ 13:00) (57 - 75)  BP: 167/74 (10-04-19 @ 13:00) (115/56 - 184/79)  RR: 29 (10-04-19 @ 13:00) (20 - 34)  SpO2: 96% (10-04-19 @ 13:00) (94% - 99%)  Wt(kg): --    ALLERGIES  No Known Allergies      MEDICATIONS   acetaminophen    Suspension .. 650 milliGRAM(s) Oral every 6 hours PRN  ALBUTerol    0.083% 2.5 milliGRAM(s) Nebulizer every 6 hours  bromocriptine Tablet 5 milliGRAM(s) Oral <User Schedule>  cefepime   IVPB 2000 milliGRAM(s) IV Intermittent every 8 hours  cefepime   IVPB      chlorhexidine 2% Cloths 1 Application(s) Topical daily  enoxaparin Injectable 40 milliGRAM(s) SubCutaneous daily  melatonin 3 milliGRAM(s) Oral at bedtime  metroNIDAZOLE  IVPB 500 milliGRAM(s) IV Intermittent every 8 hours  metroNIDAZOLE  IVPB      modafinil 100 milliGRAM(s) Oral daily  pantoprazole  Injectable 40 milliGRAM(s) IV Push daily  primidone 50 milliGRAM(s) Oral two times a day  propranolol 20 milliGRAM(s) Oral every 6 hours  tobramycin for Nebulization 300 milliGRAM(s) Inhalation every 12 hours      ----------------------------------------------------------------------------------------  PHYSICAL EXAM  Constitutional - NAD, does not open eyes  HEENT - does not open eyes, attempted to open pt eyes but resisting  Chest - remains with O2 mask for oxygen support  Abdomen - BS+, Soft, ND  Extremities - + edema in b/l LE  Neurologic Exam -                    Cognitive - does not open eyes today; intermittently poor command following today compared to prior exam     Communication - nonverbal at this time, was able to show me 2 fingers on left hand when asked     Sensory - unreliable at this time    ----------------------------------------------------------------------------------------  ASSESSMENT/PLAN    83 yo Male with functional deficits after an acute left basal ganglia hemorrhage    Left BG hemorrhage - +EVD removed after repeat CT head today  Acute respiratory failure with hypoxia/Pneumonia/Fevers - Cefepime, Flagyl, Albuterol, O2 mask for oxygen demand  Arousal - Bromocriptine 5mg BID 6AM/12PM (10/4), Amantadine 100mg BID (DC 10/4), Provigil 100mg daily (10/4)  Sleep - Melatonin 3mg (10/1)  HTN - Propranolol  Tremor? - Primidone  Pain - Tylenol  Diet - NPO, +NGT   DVT PPX - SCDs, Lovenox  Rehab - Patient medically/surgically being optimized and in guarded state. Patient continues to have intermittent fevers. Family is considering DNR/DNI.    Continue mobilization by staff to maintain cardiopulmonary function and prevention of secondary complications related to debility. Will continue to follow for ongoing rehab needs and recommendations. . Functional progress will determine ongoing rehab dispo recommendations, which may change.

## 2019-10-04 NOTE — PROGRESS NOTE ADULT - PROBLEM SELECTOR PLAN 6
Spoke to wife yesterday in the later afternoon. She appeared sad, reminiscing over their 57 years of marriage. She verbalized that she knew her 's condition was not good and will have significant changes to their lives.  Offered emotional support.   Discussed advance directives as above. Daughters will discuss with rest of family.  Will cont to follow.

## 2019-10-04 NOTE — PROGRESS NOTE ADULT - SUBJECTIVE AND OBJECTIVE BOX
· Subjective and Objective: 	  83 yo M with PMH of HTN, [place] [facility] [place] with IVH.   Last seen normal was 10 pm last night, wife found him confused, agitated. Possible seizure.  Received Keppra and mannitol.  Of note, takes ASA 81 daily, no other AC/antiplts.  On admission, the patient was:  GCS: 11t ([id number]).  ICH score: 3  EVD placed on admission, at 10, EVD clamped x 48 hrs  ICP have been 3-11 overnight, CT just done s/p 48 hr clamp trial       8SURGERY:   PAST MEDICAL HISTORY: Hypothyroid  HTN (hypertension)    PAST SURGICAL HISTORY:   FAMILY HISTORY:    ALLERGIES: No Known Allergies    **************************************  **************************************    OVERNIGHT EVENTS: [] None    ROS  Unobtainable due to mental status    ICU Vital Signs Last 24 Hrs  T(C): 37.1 (04 Oct 2019 08:00), Max: 39.3 (03 Oct 2019 12:00)  T(F): 98.8 (04 Oct 2019 08:00), Max: 102.8 (03 Oct 2019 12:00)  HR: 60 (04 Oct 2019 10:00) (57 - 91)  BP: 165/118 (04 Oct 2019 10:00) (115/56 - 179/80)  BP(mean): 134 (04 Oct 2019 10:00) (81 - 134)  RR: 24 (04 Oct 2019 10:00) (20 - 34)  SpO2: 96% (04 Oct 2019 10:00) (94% - 99%)     10-03 @ 07:01  -  10-04 @ 07:00  --------------------------------------------------------  IN: 5022 mL / OUT: 1550 mL / NET: 3472 mL           DEVICES: EVD clamped x 48 hours    NEUROIMAGING: CT done this am s/p EVD clamped x 48 hrs, looks stable to my exam, will follow up official and remove EVD     EEG REPORT: n/a     MEDICATIONS:  acetaminophen    Suspension .. 650 milliGRAM(s) Oral every 6 hours PRN  ALBUTerol    0.083% 2.5 milliGRAM(s) Nebulizer every 6 hours  bromocriptine Tablet 5 milliGRAM(s) Oral <User Schedule>  cefepime   IVPB 2000 milliGRAM(s) IV Intermittent every 8 hours  cefepime   IVPB      chlorhexidine 2% Cloths 1 Application(s) Topical daily  enoxaparin Injectable 40 milliGRAM(s) SubCutaneous daily  hydrALAZINE Injectable 10 milliGRAM(s) IV Push once  lidocaine 2% Jelly 10 milliLiter(s) IntraUrethral once  melatonin 3 milliGRAM(s) Oral at bedtime  metroNIDAZOLE  IVPB 500 milliGRAM(s) IV Intermittent every 8 hours  metroNIDAZOLE  IVPB      modafinil 100 milliGRAM(s) Oral daily  pantoprazole  Injectable 40 milliGRAM(s) IV Push daily  primidone 50 milliGRAM(s) Oral two times a day  propranolol 20 milliGRAM(s) Oral every 6 hours  tobramycin for Nebulization 300 milliGRAM(s) Inhalation every 12 hours  vancomycin  IVPB 1000 milliGRAM(s) IV Intermittent every 12 hours      PHYSICAL EXAM:  appear comfortable     Neurological: keeps eyes closed, resists opening them, does intermittently follow some commands like sticking out tongue, and thumbs up, he moves his forearms purposefully off the bed to command, unable to lift elbows, wiggles toes bilaterally  Lungs:  copious oral secretions on venti mask   Heart:  S1S2 RRR  Abdomen: soft  Extremities: no edema  Skin: intact       LABS:                        10.1   11.09 )-----------( 152      ( 04 Oct 2019 03:25 )             30.5    10-04    135  |  97<L>  |  25.0<H>  ----------------------------<  151<H>  4.0   |  28.0  |  0.78    Ca    8.1<L>      04 Oct 2019 03:25  Phos  2.3     10-04  Mg     2.1     10-04     ABG - ( 03 Oct 2019 11:43 )  pH, Arterial: 7.52  pH, Blood: x     /  pCO2: 35    /  pO2: 64    / HCO3: 30    / Base Excess: 5.8   /  SaO2: 95                                10-03-19 @ 07:01  -  10-04-19 @ 07:00  --------------------------------------------------------  IN: 5022 mL / OUT: 1550 mL / NET: 3472 mL        ASSESSMENT/PLAN:  85 y/o male s/p Left BG ICH with hydro requiring drain.   EVD now clamped x 48 hours.  Active ICU needs include frequent suctioning and pulmonary toilet      NEURO:  Activity: [x] mobilize as tolerated [] Bedrest [x] PT [x] OT [] PMNR  frequent neuro checks  will likely remove EVD today, hold SQL for now  continue modifinil   no need for AEDs    PULM:   aggressive pulm toilet  antibiotics per ICU     CV:  SBP goal < 160     RENAL: IVK  Fluids:    GI:  Diet: per ICU   GI prophylaxis [x] not indicated [] PPI [] other:      ENDO:   Goal euglycemia (-180)    HEME/ONC:  VTE prophylaxis: [] SCDs [x] chemoprophylaxis [] high risk of DVT/PE on admission due to:    ID: febrile yesterday, CSF NTG from 10/1    MISC:    SOCIAL/FAMILY:  [] awaiting [x] updated at bedside [] family meeting    CODE STATUS:  [] Full Code [] DNR [] DNI [] Palliative/Comfort Care                Assessment:  Please Check When Present   []  GCS  E   V  M     Heart Failure: []Acute, [] acute on chronic , []chronic  Heart Failure:  [] Diastolic (HFpEF), [] Systolic (HFrEF), []Combined (HFpEF and HFrEF), [] RHF, [] Pulm HTN, [] Other    [] KORI, [] ATN, [] AIN, [] other  [] CKD1, [] CKD2, [] CKD 3, [] CKD 4, [] CKD 5, []ESRD    Encephalopathy: [] Metabolic, [] Hepatic, [] toxic, [] Neurological, [] Other    Abnormal Nurtitional Status: [] malnurtition (see nutrition note), [ ]underweight: BMI < 19, [] morbid obesity: BMI >40, [] Cachexia    [] Sepsis  [] hypovolemic shock,[] cardiogenic shock, [] hemorrhagic shock, [] neuogenic shock  [] Acute Respiratory Failure  []Cerebral edema, [x] Brain compression/ herniation,   [] Functional quadriplegia  [] Acute blood loss anemia

## 2019-10-04 NOTE — PROGRESS NOTE ADULT - SUBJECTIVE AND OBJECTIVE BOX
[facility]                                        Ne[facility] [place]                                 Frieda Saunders, & Jose                                  [address]                                        [place], [place], [address]                                             [phone]        CC: intracranial hemorrhage     HPI:   The patient is a 84y Male who presented as a transfer to [facility] for management of ICH.  He apparently may have had seizure and right sided weakness and was brought to [facility].  The patient was transferred to [facility] for intracranial hemorrhage management and neurosurgical eval.  He was intubated for transfer.  An extraventricular drain was placed. (27 Sep 2019 17:30).    Interim history: In SICU, eyes closed, follows commands    ROS:  Unobtainable due to patient's condition.     MEDICATIONS  (STANDING):  ALBUTerol    0.083% 2.5 milliGRAM(s) Nebulizer every 6 hours  bromocriptine Tablet 5 milliGRAM(s) Oral <User Schedule>  cefepime   IVPB 2000 milliGRAM(s) IV Intermittent every 8 hours  cefepime   IVPB      chlorhexidine 2% Cloths 1 Application(s) Topical daily  enoxaparin Injectable 40 milliGRAM(s) SubCutaneous daily  melatonin 3 milliGRAM(s) Oral at bedtime  metroNIDAZOLE  IVPB 500 milliGRAM(s) IV Intermittent every 8 hours  metroNIDAZOLE  IVPB      modafinil 100 milliGRAM(s) Oral daily  pantoprazole  Injectable 40 milliGRAM(s) IV Push daily  primidone 50 milliGRAM(s) Oral two times a day  propranolol 20 milliGRAM(s) Oral every 6 hours  tobramycin for Nebulization 300 milliGRAM(s) Inhalation every 12 hours    MEDICATIONS  (PRN):  acetaminophen    Suspension .. 650 milliGRAM(s) Oral every 6 hours PRN Temp greater or equal to 38C (100.4F)      Vital Signs Last 24 Hrs  T(C): 38.8 (04 Oct 2019 12:00), Max: 39.3 (04 Oct 2019 00:11)  T(F): 101.8 (04 Oct 2019 12:00), Max: 102.8 (04 Oct 2019 00:11)  HR: 73 (04 Oct 2019 15:18) (57 - 75)  BP: 167/74 (04 Oct 2019 13:00) (115/56 - 184/79)  BP(mean): 106 (04 Oct 2019 13:00) (81 - 134)  RR: 29 (04 Oct 2019 13:00) (20 - 34)  SpO2: 96% (04 Oct 2019 15:18) (94% - 99%)  Detailed Neurologic Exam:    Mental status: The patient is awake but non verbal. He forces eyes shut.  following simple  instructions     Cranial nerves: Pupils equal and react symmetrically to light. Extraocular motion is difficult to assess as he does not open eyes/keep eyes open. Facial musculature is asymmetric with central right weakness. Palate and tongue are difficult to evaluate.     Motor/sensory: There is normal bulk and tone.  There is no tremor.  Moving right minimally (2/5) to stimuli.  Moving left 5/5 to stimuli.     Reflexes: 1+ throughout and plantar responses are flexor left, extensor right.    Cerebellar: Cannot be assessed.     Labs:                           10.1   11.09 )-----------( 152      ( 04 Oct 2019 03:25 )             30.5     10-04    135  |  97<L>  |  25.0<H>  ----------------------------<  151<H>  4.0   |  28.0  |  0.78    Ca    8.1<L>      04 Oct 2019 03:25  Phos  2.3     10-04  Mg     2.1     10-04      Rad:   CT head 10/4- left BG, slightly smaller, mild improved IVH, no hydrocephalus, no mass  CT head 10/2- stable left BG ICH/ IVH in b/l lat vents, 3/4vent ICH improved, small b/l midline frontoparietal SAH  Repeat CT 9/30 . There is stable left basal ganglia intracranial hemorrhage with slightly improved intraventricular hemorrhage. There is some new trace bilateral subarachnoid hemorrhage in the frontal/parietal regions. Ventricular size is stable.

## 2019-10-04 NOTE — PROCEDURE NOTE - GENERAL PROCEDURE DETAILS
3 staples and suture x1 that secured EVD removed w/o resistance/ tip intact, EVD exit site stapled x2, no oozing/erythema or swelling appreciated/ no CSF leakage.
Kocher's point identified. Under aseptic techniques skin incision, kane hole created, dura punctured. Catheter inserted, 6 cm at inner plate. Good CSF flow. Low opening pressure. Drain fixated in place.

## 2019-10-04 NOTE — PROGRESS NOTE ADULT - ASSESSMENT
84y Male who is followed by neurology because of ICH    ICH  Likely hypertensive.  Avoid anti platelet medications.  Avoid anti coagulant medications.  Off keppra  Repeat head CT grossly stable/improved  PT/OT when tolerated    Hypertension   Control blood pressure.     Poor prognosis for meaningful recovery    will follow with you    Oscar Malave MD PhD   [id number]

## 2019-10-04 NOTE — PROGRESS NOTE ADULT - SUBJECTIVE AND OBJECTIVE BOX
INTERVAL HPI/OVERNIGHT EVENTS/SUBJECTIVE:    ICU Vital Signs Last 24 Hrs  T(C): 38.9 (04 Oct 2019 05:00), Max: 39.3 (03 Oct 2019 12:00)  T(F): 102 (04 Oct 2019 05:00), Max: 102.8 (03 Oct 2019 12:00)  HR: 69 (04 Oct 2019 06:00) (57 - 91)  BP: 153/70 (04 Oct 2019 06:00) (115/56 - 191/83)  BP(mean): 100 (04 Oct 2019 06:00) (81 - 132)  ABP: --  ABP(mean): --  RR: 24 (04 Oct 2019 06:00) (20 - 39)  SpO2: 94% (04 Oct 2019 06:00) (92% - 99%)      I&O's Detail    03 Oct 2019 07:01  -  04 Oct 2019 07:00  --------------------------------------------------------  IN:    Enteral Tube Flush: 480 mL    Pivot 1.5: 1380 mL    Solution: 250 mL    Solution: 100 mL    Solution: 300 mL    Solution: 166 mL    Solution: 750 mL    sterile water: 1596 mL  Total IN: 5022 mL    OUT:    Incontinent per Collection Ba mL  Total OUT: 1550 mL    Total NET: 3472 mL            ABG - ( 03 Oct 2019 11:43 )  pH, Arterial: 7.52  pH, Blood: x     /  pCO2: 35    /  pO2: 64    / HCO3: 30    / Base Excess: 5.8   /  SaO2: 95                  MEDICATIONS  (STANDING):  ALBUTerol    0.083% 2.5 milliGRAM(s) Nebulizer every 6 hours  amantadine Syrup 100 milliGRAM(s) Oral <User Schedule>  cefepime   IVPB 2000 milliGRAM(s) IV Intermittent every 8 hours  cefepime   IVPB      chlorhexidine 2% Cloths 1 Application(s) Topical daily  enoxaparin Injectable 40 milliGRAM(s) SubCutaneous daily  melatonin 3 milliGRAM(s) Oral at bedtime  metroNIDAZOLE  IVPB 500 milliGRAM(s) IV Intermittent every 8 hours  metroNIDAZOLE  IVPB      pantoprazole  Injectable 40 milliGRAM(s) IV Push daily  primidone 50 milliGRAM(s) Oral two times a day  propranolol 20 milliGRAM(s) Oral every 6 hours  tobramycin for Nebulization 300 milliGRAM(s) Inhalation every 12 hours  vancomycin  IVPB 1000 milliGRAM(s) IV Intermittent every 12 hours    MEDICATIONS  (PRN):  acetaminophen    Suspension .. 650 milliGRAM(s) Oral every 6 hours PRN Temp greater or equal to 38C (100.4F)      NUTRITION/IVF:     CENTRAL LINE:  LOCATION:   DATE INSERTED:  CVP:  SCVO2:    CASTELLANOS:   DATE INSERTED:    A-LINE:    LOCATION:   DATE INSERTED:   SVV:  CO/CI:     CHEST TUBE:  LOCATION:  DATE INSERTED: OUTPUT/24 HRS:  SUCTION/WATER SEAL:     NG/OG TUBE:  DATE INSERTED:  OUTPUT/24 HRS:    MISC:     PHYSICAL EXAM:    Gen:    Eyes:    Neurological:    ENMT:    Neck:    Pulmonary:    Cardiovascular:    Gastrointestinal:    Genitourinary:    Back:    Extremities:    Skin:    Musculoskeletal:          LABS:  CBC Full  -  ( 04 Oct 2019 03:25 )  WBC Count : 11.09 K/uL  RBC Count : 3.22 M/uL  Hemoglobin : 10.1 g/dL  Hematocrit : 30.5 %  Platelet Count - Automated : 152 K/uL  Mean Cell Volume : 94.7 fl  Mean Cell Hemoglobin : 31.4 pg  Mean Cell Hemoglobin Concentration : 33.1 gm/dL  Auto Neutrophil # : 8.04 K/uL  Auto Lymphocyte # : 1.39 K/uL  Auto Monocyte # : 1.39 K/uL  Auto Eosinophil # : 0.04 K/uL  Auto Basophil # : 0.04 K/uL  Auto Neutrophil % : 72.5 %  Auto Lymphocyte % : 12.5 %  Auto Monocyte % : 12.5 %  Auto Eosinophil % : 0.4 %  Auto Basophil % : 0.4 %    10-04    135  |  97<L>  |  25.0<H>  ----------------------------<  151<H>  4.0   |  28.0  |  0.78    Ca    8.1<L>      04 Oct 2019 03:25  Phos  2.3     10-04  Mg     2.1     10-          RECENT CULTURES:  10-02 .Sputum XXXX   Moderate White blood cells  Few Gram Negative Rods  Few Gram Positive Cocci in Pairs and Chains  Few Gram Positive Rods   Few Gram Negative Rods Identification and susceptibility to follow.  Numerous Routine respiratory maine present  Culture in progress    10-01 .CSF XXXX   Few White blood cells  No organisms seen   No growth at 1 day.  Culture in progress    10-01 .Blood XXXX XXXX   No growth at 48 hours              CAPILLARY BLOOD GLUCOSE      RADIOLOGY & ADDITIONAL STUDIES:    ASSESSMENT/PLAN:  84yMale presenting with:    Neuro:    CV:    Pulm:    GI/Nutrition:    /Renal:    ID:    Lines/Tubes:    Endo:    Skin:    Proph:    Dispo:      CRITICAL CARE TIME SPENT: INTERVAL HPI/OVERNIGHT EVENTS/SUBJECTIVE: Continued fever spikes ~102, placed cooling blanket, discussion w/family concerning prognosis.    ICU Vital Signs Last 24 Hrs  T(C): 38.9 (04 Oct 2019 05:00), Max: 39.3 (03 Oct 2019 12:00)  T(F): 102 (04 Oct 2019 05:00), Max: 102.8 (03 Oct 2019 12:00)  HR: 69 (04 Oct 2019 06:00) (57 - 91)  BP: 153/70 (04 Oct 2019 06:00) (115/56 - 191/83)  BP(mean): 100 (04 Oct 2019 06:00) (81 - 132)  ABP: --  ABP(mean): --  RR: 24 (04 Oct 2019 06:00) (20 - 39)  SpO2: 94% (04 Oct 2019 06:00) (92% - 99%)      I&O's Detail    03 Oct 2019 07:01  -  04 Oct 2019 07:00  --------------------------------------------------------  IN:    Enteral Tube Flush: 480 mL    Pivot 1.5: 1380 mL    Solution: 250 mL    Solution: 100 mL    Solution: 300 mL    Solution: 166 mL    Solution: 750 mL    sterile water: 1596 mL  Total IN: 5022 mL    OUT:    Incontinent per Collection Ba mL  Total OUT: 1550 mL    Total NET: 3472 mL            ABG - ( 03 Oct 2019 11:43 )  pH, Arterial: 7.52  pH, Blood: x     /  pCO2: 35    /  pO2: 64    / HCO3: 30    / Base Excess: 5.8   /  SaO2: 95                  MEDICATIONS  (STANDING):  ALBUTerol    0.083% 2.5 milliGRAM(s) Nebulizer every 6 hours  amantadine Syrup 100 milliGRAM(s) Oral <User Schedule>  cefepime   IVPB 2000 milliGRAM(s) IV Intermittent every 8 hours  cefepime   IVPB      chlorhexidine 2% Cloths 1 Application(s) Topical daily  enoxaparin Injectable 40 milliGRAM(s) SubCutaneous daily  melatonin 3 milliGRAM(s) Oral at bedtime  metroNIDAZOLE  IVPB 500 milliGRAM(s) IV Intermittent every 8 hours  metroNIDAZOLE  IVPB      pantoprazole  Injectable 40 milliGRAM(s) IV Push daily  primidone 50 milliGRAM(s) Oral two times a day  propranolol 20 milliGRAM(s) Oral every 6 hours  tobramycin for Nebulization 300 milliGRAM(s) Inhalation every 12 hours  vancomycin  IVPB 1000 milliGRAM(s) IV Intermittent every 12 hours    MEDICATIONS  (PRN):  acetaminophen    Suspension .. 650 milliGRAM(s) Oral every 6 hours PRN Temp greater or equal to 38C (100.4F)      NUTRITION/IVF:     CENTRAL LINE:  LOCATION:   DATE INSERTED:  CVP:  SCVO2:    CASTELLANOS:   DATE INSERTED:    A-LINE:    LOCATION:   DATE INSERTED:   SVV:  CO/CI:     CHEST TUBE:  LOCATION:  DATE INSERTED: OUTPUT/24 HRS:  SUCTION/WATER SEAL:     NG/OG TUBE:  DATE INSERTED:  OUTPUT/24 HRS:    MISC:     PHYSICAL EXAM:    Gen: NAD, laying in bed, on venti    Eyes: PERRLA, 3 mm, EOMI, anicteric, conjunctiva pink, opening eyes to command    Neurological: 5/5  B/L, not following commands for UE/LE movement otherwise, expressive aphasia    ENMT: neck supple, trachea midline, MMM    Pulmonary: rhonchi upper fields    Cardiovascular: NSR, S1/S2 present, no mrg    Gastrointestinal: abdomen soft NT/ND, no recound/guarding    Extremities: no cyanosis, no edema        LABS:  CBC Full  -  ( 04 Oct 2019 03:25 )  WBC Count : 11.09 K/uL  RBC Count : 3.22 M/uL  Hemoglobin : 10.1 g/dL  Hematocrit : 30.5 %  Platelet Count - Automated : 152 K/uL  Mean Cell Volume : 94.7 fl  Mean Cell Hemoglobin : 31.4 pg  Mean Cell Hemoglobin Concentration : 33.1 gm/dL  Auto Neutrophil # : 8.04 K/uL  Auto Lymphocyte # : 1.39 K/uL  Auto Monocyte # : 1.39 K/uL  Auto Eosinophil # : 0.04 K/uL  Auto Basophil # : 0.04 K/uL  Auto Neutrophil % : 72.5 %  Auto Lymphocyte % : 12.5 %  Auto Monocyte % : 12.5 %  Auto Eosinophil % : 0.4 %  Auto Basophil % : 0.4 %    10-04    135  |  97<L>  |  25.0<H>  ----------------------------<  151<H>  4.0   |  28.0  |  0.78    Ca    8.1<L>      04 Oct 2019 03:25  Phos  2.3     10-04  Mg     2.1     10-04          RECENT CULTURES:  10-02 .Sputum XXXX   Moderate White blood cells  Few Gram Negative Rods  Few Gram Positive Cocci in Pairs and Chains  Few Gram Positive Rods   Few Gram Negative Rods Identification and susceptibility to follow.  Numerous Routine respiratory maine present  Culture in progress    10-01 .CSF XXXX   Few White blood cells  No organisms seen   No growth at 1 day.  Culture in progress    10-01 .Blood XXXX XXXX   No growth at 48 hours              CAPILLARY BLOOD GLUCOSE      RADIOLOGY & ADDITIONAL STUDIES:    ASSESSMENT/PLAN:  84yMale presenting with:    Neuro: continue q1 neuro, pending head CT this AM, d/c EVD, speech therapy consult for aphasia    CV: lopressor, -180    Pulm: venti @40%, continue pulm toilet, suction q2 via NPA, percussion    GI/Nutrition: TF @60    /Renal: condom cath    ID: Vanco/Cefepime, flagyl, Tobra nebs.  This should cover aspiration PNA, HAP and possible ventriculitis.    Lines/Tubes: EVD per neurosurgery    Endo: none    Skin: frequent bed shifts    Proph: lovenox    Dispo: SICU, pt at high risk for aspiration PNA, potential for intubation if no improvement      CRITICAL CARE TIME SPENT:

## 2019-10-05 LAB
ANION GAP SERPL CALC-SCNC: 8 MMOL/L — SIGNIFICANT CHANGE UP (ref 5–17)
ANISOCYTOSIS BLD QL: SLIGHT — SIGNIFICANT CHANGE UP
BASOPHILS # BLD AUTO: 0.22 K/UL — HIGH (ref 0–0.2)
BASOPHILS NFR BLD AUTO: 1.7 % — SIGNIFICANT CHANGE UP (ref 0–2)
BUN SERPL-MCNC: 33 MG/DL — HIGH (ref 8–20)
CALCIUM SERPL-MCNC: 8.4 MG/DL — LOW (ref 8.6–10.2)
CHLORIDE SERPL-SCNC: 100 MMOL/L — SIGNIFICANT CHANGE UP (ref 98–107)
CO2 SERPL-SCNC: 28 MMOL/L — SIGNIFICANT CHANGE UP (ref 22–29)
CREAT SERPL-MCNC: 0.76 MG/DL — SIGNIFICANT CHANGE UP (ref 0.5–1.3)
CULTURE RESULTS: SIGNIFICANT CHANGE UP
EOSINOPHIL # BLD AUTO: 0.12 K/UL — SIGNIFICANT CHANGE UP (ref 0–0.5)
EOSINOPHIL NFR BLD AUTO: 0.9 % — SIGNIFICANT CHANGE UP (ref 0–6)
GIANT PLATELETS BLD QL SMEAR: PRESENT — SIGNIFICANT CHANGE UP
GLUCOSE BLDC GLUCOMTR-MCNC: 137 MG/DL — HIGH (ref 70–99)
GLUCOSE SERPL-MCNC: 143 MG/DL — HIGH (ref 70–115)
HCT VFR BLD CALC: 35.2 % — LOW (ref 39–50)
HGB BLD-MCNC: 11.4 G/DL — LOW (ref 13–17)
LYMPHOCYTES # BLD AUTO: 1.87 K/UL — SIGNIFICANT CHANGE UP (ref 1–3.3)
LYMPHOCYTES # BLD AUTO: 14.4 % — SIGNIFICANT CHANGE UP (ref 13–44)
MAGNESIUM SERPL-MCNC: 2.3 MG/DL — SIGNIFICANT CHANGE UP (ref 1.6–2.6)
MANUAL SMEAR VERIFICATION: SIGNIFICANT CHANGE UP
MCHC RBC-ENTMCNC: 31.1 PG — SIGNIFICANT CHANGE UP (ref 27–34)
MCHC RBC-ENTMCNC: 32.4 GM/DL — SIGNIFICANT CHANGE UP (ref 32–36)
MCV RBC AUTO: 96.2 FL — SIGNIFICANT CHANGE UP (ref 80–100)
METAMYELOCYTES # FLD: 0.8 % — HIGH (ref 0–0)
MONOCYTES # BLD AUTO: 1.43 K/UL — HIGH (ref 0–0.9)
MONOCYTES NFR BLD AUTO: 11 % — SIGNIFICANT CHANGE UP (ref 2–14)
MYELOCYTES NFR BLD: 0.8 % — HIGH (ref 0–0)
NEUTROPHILS # BLD AUTO: 9.17 K/UL — HIGH (ref 1.8–7.4)
NEUTROPHILS NFR BLD AUTO: 69.5 % — SIGNIFICANT CHANGE UP (ref 43–77)
NEUTS BAND # BLD: 0.9 % — SIGNIFICANT CHANGE UP (ref 0–8)
OSMOLALITY SERPL: 291 MOSMOL/KG — SIGNIFICANT CHANGE UP (ref 280–301)
OVALOCYTES BLD QL SMEAR: SLIGHT — SIGNIFICANT CHANGE UP
PHOSPHATE SERPL-MCNC: 3.2 MG/DL — SIGNIFICANT CHANGE UP (ref 2.4–4.7)
PLAT MORPH BLD: NORMAL — SIGNIFICANT CHANGE UP
PLATELET # BLD AUTO: 140 K/UL — LOW (ref 150–400)
POIKILOCYTOSIS BLD QL AUTO: SLIGHT — SIGNIFICANT CHANGE UP
POLYCHROMASIA BLD QL SMEAR: SLIGHT — SIGNIFICANT CHANGE UP
POTASSIUM SERPL-MCNC: 4.2 MMOL/L — SIGNIFICANT CHANGE UP (ref 3.5–5.3)
POTASSIUM SERPL-SCNC: 4.2 MMOL/L — SIGNIFICANT CHANGE UP (ref 3.5–5.3)
RBC # BLD: 3.66 M/UL — LOW (ref 4.2–5.8)
RBC # FLD: 14.5 % — SIGNIFICANT CHANGE UP (ref 10.3–14.5)
RBC BLD AUTO: ABNORMAL
SODIUM SERPL-SCNC: 136 MMOL/L — SIGNIFICANT CHANGE UP (ref 135–145)
SPECIMEN SOURCE: SIGNIFICANT CHANGE UP
WBC # BLD: 13.02 K/UL — HIGH (ref 3.8–10.5)
WBC # FLD AUTO: 13.02 K/UL — HIGH (ref 3.8–10.5)

## 2019-10-05 PROCEDURE — 99232 SBSQ HOSP IP/OBS MODERATE 35: CPT

## 2019-10-05 PROCEDURE — 70450 CT HEAD/BRAIN W/O DYE: CPT | Mod: 26

## 2019-10-05 RX ORDER — SODIUM CHLORIDE 9 MG/ML
1000 INJECTION, SOLUTION INTRAVENOUS
Refills: 0 | Status: DISCONTINUED | OUTPATIENT
Start: 2019-10-05 | End: 2019-10-08

## 2019-10-05 RX ADMIN — Medication 650 MILLIGRAM(S): at 12:25

## 2019-10-05 RX ADMIN — CEFEPIME 100 MILLIGRAM(S): 1 INJECTION, POWDER, FOR SOLUTION INTRAMUSCULAR; INTRAVENOUS at 23:05

## 2019-10-05 RX ADMIN — CEFEPIME 100 MILLIGRAM(S): 1 INJECTION, POWDER, FOR SOLUTION INTRAMUSCULAR; INTRAVENOUS at 06:06

## 2019-10-05 RX ADMIN — BROMOCRIPTINE MESYLATE 5 MILLIGRAM(S): 5 CAPSULE ORAL at 12:51

## 2019-10-05 RX ADMIN — Medication 100 MILLIGRAM(S): at 06:07

## 2019-10-05 RX ADMIN — Medication 650 MILLIGRAM(S): at 07:00

## 2019-10-05 RX ADMIN — ALBUTEROL 2.5 MILLIGRAM(S): 90 AEROSOL, METERED ORAL at 08:07

## 2019-10-05 RX ADMIN — PANTOPRAZOLE SODIUM 40 MILLIGRAM(S): 20 TABLET, DELAYED RELEASE ORAL at 12:51

## 2019-10-05 RX ADMIN — Medication 650 MILLIGRAM(S): at 06:20

## 2019-10-05 RX ADMIN — PRIMIDONE 50 MILLIGRAM(S): 250 TABLET ORAL at 17:14

## 2019-10-05 RX ADMIN — Medication 300 MILLIGRAM(S): at 08:17

## 2019-10-05 RX ADMIN — Medication 650 MILLIGRAM(S): at 00:00

## 2019-10-05 RX ADMIN — PRIMIDONE 50 MILLIGRAM(S): 250 TABLET ORAL at 06:07

## 2019-10-05 RX ADMIN — Medication 3 MILLIGRAM(S): at 23:04

## 2019-10-05 RX ADMIN — Medication 650 MILLIGRAM(S): at 21:33

## 2019-10-05 RX ADMIN — Medication 300 MILLIGRAM(S): at 20:25

## 2019-10-05 RX ADMIN — ALBUTEROL 2.5 MILLIGRAM(S): 90 AEROSOL, METERED ORAL at 03:17

## 2019-10-05 RX ADMIN — Medication 166.67 MILLIGRAM(S): at 13:53

## 2019-10-05 RX ADMIN — MODAFINIL 100 MILLIGRAM(S): 200 TABLET ORAL at 12:52

## 2019-10-05 RX ADMIN — Medication 650 MILLIGRAM(S): at 12:55

## 2019-10-05 RX ADMIN — SODIUM CHLORIDE 42 MILLILITER(S): 9 INJECTION, SOLUTION INTRAVENOUS at 14:31

## 2019-10-05 RX ADMIN — CEFEPIME 100 MILLIGRAM(S): 1 INJECTION, POWDER, FOR SOLUTION INTRAMUSCULAR; INTRAVENOUS at 13:53

## 2019-10-05 RX ADMIN — ALBUTEROL 2.5 MILLIGRAM(S): 90 AEROSOL, METERED ORAL at 20:25

## 2019-10-05 RX ADMIN — Medication 166.67 MILLIGRAM(S): at 02:02

## 2019-10-05 RX ADMIN — BROMOCRIPTINE MESYLATE 5 MILLIGRAM(S): 5 CAPSULE ORAL at 06:07

## 2019-10-05 RX ADMIN — ALBUTEROL 2.5 MILLIGRAM(S): 90 AEROSOL, METERED ORAL at 14:30

## 2019-10-05 RX ADMIN — CHLORHEXIDINE GLUCONATE 1 APPLICATION(S): 213 SOLUTION TOPICAL at 12:52

## 2019-10-05 RX ADMIN — ENOXAPARIN SODIUM 40 MILLIGRAM(S): 100 INJECTION SUBCUTANEOUS at 12:51

## 2019-10-05 RX ADMIN — Medication 650 MILLIGRAM(S): at 23:04

## 2019-10-05 NOTE — PROGRESS NOTE ADULT - SUBJECTIVE AND OBJECTIVE BOX
24 HOUR EVENTS: Patient continues on venti mask 50%, sating well.  Requiring frequent NT suctioning and chest percussion.  Continues on antibiotic therapy for pneumonia.  EVD d/fernando yesterday, repeat head ct with stable hemorrhagic stroke, with small amount of pneumocephalus noted.  IVC US 40% variations, without b lines, given rising bun/cr and active pneumonia will restart IVF.  Otherwise VS remain stable.     SUBJECTIVE/ROS:  [ ] A ten-point review of systems was otherwise negative except as noted.  [x ] Due to altered mental status/intubation, subjective information were not able to be obtained from the patient. History was obtained, to the extent possible, from review of the chart and collateral sources of information.      NEURO  RASS:     GCS:     CAM ICU:  Exam: see below   Meds: acetaminophen    Suspension .. 650 milliGRAM(s) Oral every 6 hours PRN Temp greater or equal to 38C (100.4F)  bromocriptine Tablet 5 milliGRAM(s) Oral <User Schedule>  melatonin 3 milliGRAM(s) Oral at bedtime  modafinil 100 milliGRAM(s) Oral daily  primidone 50 milliGRAM(s) Oral two times a day    [x] Adequacy of sedation and pain control has been assessed and adjusted      RESPIRATORY  RR: 22 (10-05-19 @ 13:00) (12 - 47)  SpO2: 99% (10-05-19 @ 13:00) (89% - 100%)  Wt(kg): --  Exam: unlabored, accessory muscle use intermittently, sating well on venti mask, rhonchi b/l R>L   Mechanical Ventilation:     [ ] Extubation Readiness Assessed  Meds: ALBUTerol    0.083% 2.5 milliGRAM(s) Nebulizer every 6 hours        CARDIOVASCULAR  HR: 63 (10-05-19 @ 13:00) (56 - 82)  BP: 151/67 (10-05-19 @ 13:00) (112/54 - 185/74)  BP(mean): 97 (10-05-19 @ 13:00) (77 - 112)  ABP: --  ABP(mean): --  Wt(kg): --  CVP(cm H2O): --      Exam: NSR   Cardiac Rhythm: RRR  Perfusion     [ x]Adequate   [ ]Inadequate  Mentation   [ ]Normal       [x ]Reduced  Extremities  [x ]Warm         [ ]Cool  Volume Status [ ]Hypervolemic [x ]Euvolemic [ ]Hypovolemic  Meds: propranolol 20 milliGRAM(s) Oral every 6 hours        GI/NUTRITION  Exam: soft, nttp, nd  Diet: TF at goal   Meds: pantoprazole  Injectable 40 milliGRAM(s) IV Push daily      GENITOURINARY  I&O's Detail    10-04 @ 07:01  -  10-05 @ 07:00  --------------------------------------------------------  IN:    Enteral Tube Flush: 730 mL    Pivot 1.5: 1200 mL    Solution: 300 mL    Solution: 150 mL    Solution: 332 mL    Solution: 250 mL    Solution: 250 mL  Total IN: 3212 mL    OUT:    Indwelling Catheter - Urethral: 2335 mL  Total OUT: 2335 mL    Total NET: 877 mL      10-05 @ 07:01  -  10-05 @ 13:36  --------------------------------------------------------  IN:    Enteral Tube Flush: 100 mL    Pivot 1.5: 330 mL  Total IN: 430 mL    OUT:    Indwelling Catheter - Urethral: 325 mL  Total OUT: 325 mL    Total NET: 105 mL          10-05    136  |  100  |  33.0<H>  ----------------------------<  143<H>  4.2   |  28.0  |  0.76    Ca    8.4<L>      05 Oct 2019 05:42  Phos  3.2     10-05  Mg     2.3     10-05      [ x] Morris catheter, indication: urinary retention   Meds:       PHYSICAL EXAM:    Gen: NAD, laying in bed, on venti    Eyes: PERRLA, 3 mm, EOMI, anicteric, conjunctiva pink, opening eyes to command    Neurological: 5/5  B/L, not following commands for UE/LE movement otherwise, expressive aphasia    ENMT: neck supple, trachea midline, MMM    Pulmonary: rhonchi upper fields    Cardiovascular: NSR, S1/S2 present, no mrg    Gastrointestinal: abdomen soft NT/ND, no recound/guarding    Extremities: no cyanosis, no edema    HEMATOLOGIC  Meds: enoxaparin Injectable 40 milliGRAM(s) SubCutaneous daily    [x] VTE Prophylaxis                        11.4   13.02 )-----------( 140      ( 05 Oct 2019 05:42 )             35.2       Transfusion     [ ] PRBC   [ ] Platelets   [ ] FFP   [ ] Cryoprecipitate      INFECTIOUS DISEASES  T(C): 38.5 (10-05-19 @ 12:00), Max: 39.4 (10-04-19 @ 23:00)  Wt(kg): --  WBC Count: 13.02 K/uL (10-05 @ 05:42)    Recent Cultures:  Specimen Source: .Sputum, 10-02 @ 14:11; Results   Moderate Enterobacter cloacae  Numerous Routine respiratory maine present; Gram Stain:   Moderate White blood cells  Few Gram Negative Rods  Few Gram Positive Cocci in Pairs and Chains  Few Gram Positive Rods; Organism: Enterobacter cloacae  Specimen Source: .CSF, 10-01 @ 18:13; Results   No growth at 3 days.; Gram Stain:   Few White blood cells  No organisms seen; Organism: --  Specimen Source: .Blood, 10-01 @ 10:00; Results   No growth at 48 hours; Gram Stain: --; Organism: --    Meds: cefepime   IVPB 2000 milliGRAM(s) IV Intermittent every 8 hours  cefepime   IVPB      tobramycin for Nebulization 300 milliGRAM(s) Inhalation every 12 hours  vancomycin  IVPB 1250 milliGRAM(s) IV Intermittent <User Schedule>        ENDOCRINE  Capillary Blood Glucose    Meds:       ACCESS DEVICES:  [ ] Peripheral IV  [ ] Central Venous Line	[ ] R	[ ] L	[ ] IJ	[ ] Fem	[ ] SC	Placed:   [ ] Arterial Line		[ ] R	[ ] L	[ ] Fem	[ ] Rad	[ ] Ax	Placed:   [ ] PICC:					[ ] Mediport  [ ] Urinary Catheter, Date Placed:   [ ] Necessity of urinary, arterial, and venous catheters discussed    OTHER MEDICATIONS:  chlorhexidine 2% Cloths 1 Application(s) Topical daily      CODE STATUS: Yes      IMAGING:  Stable left basal ganglia hemorrhage. Stable  moderate intraventricular hemorrhage. Stable ventricular size. No new   hemorrhage. No hydrocephalus.   Small pneumocephalus following removal of ventriculostomy catheter   through RIGHT frontal kane hole.

## 2019-10-05 NOTE — PROGRESS NOTE ADULT - SUBJECTIVE AND OBJECTIVE BOX
[facility]                                        [facility] [place]                                 Frieda Saunders, & Jose                                  [address]                                        [place], [place], [address]                                             [phone]        CC: intracranial hemorrhage     HPI:   The patient is a 84y Male who presented as a transfer to [facility] for management of ICH.  He apparently may have had seizure and right sided weakness and was brought to [facility].  The patient was transferred to [facility] for intracranial hemorrhage management and neurosurgical eval.  He was intubated for transfer.  An extraventricular drain was placed. (27 Sep 2019 17:30).    Interim history: In SICU, eyes closed, febrile, minimally follows commands, EVD removed    ROS:  Unobtainable due to patient's condition.     MEDICATIONS  (STANDING):  ALBUTerol    0.083% 2.5 milliGRAM(s) Nebulizer every 6 hours  bromocriptine Tablet 5 milliGRAM(s) Oral <User Schedule>  cefepime   IVPB 2000 milliGRAM(s) IV Intermittent every 8 hours  cefepime   IVPB      chlorhexidine 2% Cloths 1 Application(s) Topical daily  enoxaparin Injectable 40 milliGRAM(s) SubCutaneous daily  melatonin 3 milliGRAM(s) Oral at bedtime  modafinil 100 milliGRAM(s) Oral daily  pantoprazole  Injectable 40 milliGRAM(s) IV Push daily  primidone 50 milliGRAM(s) Oral two times a day  propranolol 20 milliGRAM(s) Oral every 6 hours  tobramycin for Nebulization 300 milliGRAM(s) Inhalation every 12 hours  vancomycin  IVPB 1250 milliGRAM(s) IV Intermittent <User Schedule>    MEDICATIONS  (PRN):  acetaminophen    Suspension .. 650 milliGRAM(s) Oral every 6 hours PRN Temp greater or equal to 38C (100.4F)      Vital Signs Last 24 Hrs  T(C): 38.5 (05 Oct 2019 12:00), Max: 39.4 (04 Oct 2019 23:00)  T(F): 101.3 (05 Oct 2019 12:00), Max: 102.9 (04 Oct 2019 23:00)  HR: 60 (05 Oct 2019 10:00) (56 - 82)  BP: 127/58 (05 Oct 2019 10:00) (112/54 - 185/74)  BP(mean): 84 (05 Oct 2019 10:00) (77 - 112)  RR: 24 (05 Oct 2019 10:00) (12 - 47)  SpO2: 97% (05 Oct 2019 10:00) (89% - 99%)    Detailed Neurologic Exam:    Mental status: The patient is awake but non verbal. He forces eyes shut.  following simple  instructions     Cranial nerves: Pupils equal and react symmetrically to light. Extraocular motion is difficult to assess as he does not open eyes/keep eyes open. Facial musculature is asymmetric with central right weakness. Palate and tongue are difficult to evaluate.     Motor/sensory: There is normal bulk and tone.  There is no tremor.  Moving right minimally (3/5) to stimuli.  Moving left 5/5 to stimuli.     Reflexes: diminished throughout and plantar responses are flexor left, extensor right.    Cerebellar: Cannot be assessed.     Labs:                             11.4   13.02 )-----------( 140      ( 05 Oct 2019 05:42 )             35.2     10-05    136  |  100  |  33.0<H>  ----------------------------<  143<H>  4.2   |  28.0  |  0.76    Ca    8.4<L>      05 Oct 2019 05:42  Phos  3.2     10-05  Mg     2.3     10-05      Rad:   CT head 10/5- stable left BG ICH and IVH. no mass no sig HCP  CT head 10/4- left BG, slightly smaller, mild improved IVH, no hydrocephalus, no mass  CT head 10/2- stable left BG ICH/ IVH in b/l lat vents, 3/4vent ICH improved, small b/l midline frontoparietal SAH  Repeat CT 9/30 . There is stable left basal ganglia intracranial hemorrhage with slightly improved intraventricular hemorrhage. There is some new trace bilateral subarachnoid hemorrhage in the frontal/parietal regions. Ventricular size is stable.

## 2019-10-05 NOTE — PROGRESS NOTE ADULT - ASSESSMENT
ASSESSMENT/PLAN:  84yMale presenting with large L basal ganglia hemorrahge stroke     Neuro: continue q2 neuro, repeat ct with small amount of pneumocephalus, nsg on board will downgrade to q4hr if okay     CV: continue lopressor- -180    Pulm: DNI. venti @40%, continue pulm toilet, suction q2 via NPA, percussion    GI/Nutrition: TF @60 goal (25kcal    /Renal: montejo in place with coude for urinary retention likely neurogenic    ID: HAP- enterbacter cont Vanco/Cefepime, Tobra nebs.  Tx fevers w/ tylenol     Lines/Tubes:     Endo: none    Skin: frequent bed shifts    Proph: lovenox    Dispo: SICU

## 2019-10-05 NOTE — PROGRESS NOTE ADULT - ASSESSMENT
84y Male who is followed by neurology because of ICH    ICH  Likely hypertensive.  Avoid anti platelet medications.  Avoid anti coagulant medications.  Off keppra  Repeat head CT 10/5 grossly stable  PT/OT when tolerated    Hypertension   Control blood pressure.     Poor prognosis for meaningful recovery    will follow with you    Oscar Malave MD PhD   [id number]

## 2019-10-06 LAB
ANION GAP SERPL CALC-SCNC: 11 MMOL/L — SIGNIFICANT CHANGE UP (ref 5–17)
BASOPHILS # BLD AUTO: 0.05 K/UL — SIGNIFICANT CHANGE UP (ref 0–0.2)
BASOPHILS NFR BLD AUTO: 0.4 % — SIGNIFICANT CHANGE UP (ref 0–2)
BUN SERPL-MCNC: 32 MG/DL — HIGH (ref 8–20)
CALCIUM SERPL-MCNC: 8.3 MG/DL — LOW (ref 8.6–10.2)
CHLORIDE SERPL-SCNC: 99 MMOL/L — SIGNIFICANT CHANGE UP (ref 98–107)
CO2 SERPL-SCNC: 27 MMOL/L — SIGNIFICANT CHANGE UP (ref 22–29)
CREAT SERPL-MCNC: 0.73 MG/DL — SIGNIFICANT CHANGE UP (ref 0.5–1.3)
CULTURE RESULTS: SIGNIFICANT CHANGE UP
CULTURE RESULTS: SIGNIFICANT CHANGE UP
EOSINOPHIL # BLD AUTO: 0.27 K/UL — SIGNIFICANT CHANGE UP (ref 0–0.5)
EOSINOPHIL NFR BLD AUTO: 2 % — SIGNIFICANT CHANGE UP (ref 0–6)
GLUCOSE BLDC GLUCOMTR-MCNC: 125 MG/DL — HIGH (ref 70–99)
GLUCOSE SERPL-MCNC: 131 MG/DL — HIGH (ref 70–115)
HCT VFR BLD CALC: 33.5 % — LOW (ref 39–50)
HGB BLD-MCNC: 10.8 G/DL — LOW (ref 13–17)
IMM GRANULOCYTES NFR BLD AUTO: 2.3 % — HIGH (ref 0–1.5)
LYMPHOCYTES # BLD AUTO: 1.57 K/UL — SIGNIFICANT CHANGE UP (ref 1–3.3)
LYMPHOCYTES # BLD AUTO: 11.9 % — LOW (ref 13–44)
MAGNESIUM SERPL-MCNC: 2.3 MG/DL — SIGNIFICANT CHANGE UP (ref 1.6–2.6)
MCHC RBC-ENTMCNC: 31.2 PG — SIGNIFICANT CHANGE UP (ref 27–34)
MCHC RBC-ENTMCNC: 32.2 GM/DL — SIGNIFICANT CHANGE UP (ref 32–36)
MCV RBC AUTO: 96.8 FL — SIGNIFICANT CHANGE UP (ref 80–100)
MONOCYTES # BLD AUTO: 1.45 K/UL — HIGH (ref 0–0.9)
MONOCYTES NFR BLD AUTO: 11 % — SIGNIFICANT CHANGE UP (ref 2–14)
NEUTROPHILS # BLD AUTO: 9.58 K/UL — HIGH (ref 1.8–7.4)
NEUTROPHILS NFR BLD AUTO: 72.4 % — SIGNIFICANT CHANGE UP (ref 43–77)
PHOSPHATE SERPL-MCNC: 2.7 MG/DL — SIGNIFICANT CHANGE UP (ref 2.4–4.7)
PLATELET # BLD AUTO: 216 K/UL — SIGNIFICANT CHANGE UP (ref 150–400)
POTASSIUM SERPL-MCNC: 4.6 MMOL/L — SIGNIFICANT CHANGE UP (ref 3.5–5.3)
POTASSIUM SERPL-SCNC: 4.6 MMOL/L — SIGNIFICANT CHANGE UP (ref 3.5–5.3)
RBC # BLD: 3.46 M/UL — LOW (ref 4.2–5.8)
RBC # FLD: 14.6 % — HIGH (ref 10.3–14.5)
SODIUM SERPL-SCNC: 137 MMOL/L — SIGNIFICANT CHANGE UP (ref 135–145)
SPECIMEN SOURCE: SIGNIFICANT CHANGE UP
SPECIMEN SOURCE: SIGNIFICANT CHANGE UP
VANCOMYCIN TROUGH SERPL-MCNC: 14.5 UG/ML — SIGNIFICANT CHANGE UP (ref 10–20)
WBC # BLD: 13.23 K/UL — HIGH (ref 3.8–10.5)
WBC # FLD AUTO: 13.23 K/UL — HIGH (ref 3.8–10.5)

## 2019-10-06 PROCEDURE — 99231 SBSQ HOSP IP/OBS SF/LOW 25: CPT

## 2019-10-06 PROCEDURE — 99232 SBSQ HOSP IP/OBS MODERATE 35: CPT

## 2019-10-06 RX ORDER — SCOPALAMINE 1 MG/3D
1 PATCH, EXTENDED RELEASE TRANSDERMAL
Refills: 0 | Status: DISCONTINUED | OUTPATIENT
Start: 2019-10-06 | End: 2019-10-11

## 2019-10-06 RX ADMIN — CEFEPIME 100 MILLIGRAM(S): 1 INJECTION, POWDER, FOR SOLUTION INTRAMUSCULAR; INTRAVENOUS at 05:08

## 2019-10-06 RX ADMIN — Medication 62.5 MILLIMOLE(S): at 08:31

## 2019-10-06 RX ADMIN — ALBUTEROL 2.5 MILLIGRAM(S): 90 AEROSOL, METERED ORAL at 20:34

## 2019-10-06 RX ADMIN — SCOPALAMINE 1 PATCH: 1 PATCH, EXTENDED RELEASE TRANSDERMAL at 17:06

## 2019-10-06 RX ADMIN — ALBUTEROL 2.5 MILLIGRAM(S): 90 AEROSOL, METERED ORAL at 08:20

## 2019-10-06 RX ADMIN — CEFEPIME 100 MILLIGRAM(S): 1 INJECTION, POWDER, FOR SOLUTION INTRAMUSCULAR; INTRAVENOUS at 16:00

## 2019-10-06 RX ADMIN — ENOXAPARIN SODIUM 40 MILLIGRAM(S): 100 INJECTION SUBCUTANEOUS at 11:31

## 2019-10-06 RX ADMIN — PANTOPRAZOLE SODIUM 40 MILLIGRAM(S): 20 TABLET, DELAYED RELEASE ORAL at 11:31

## 2019-10-06 RX ADMIN — Medication 300 MILLIGRAM(S): at 20:34

## 2019-10-06 RX ADMIN — BROMOCRIPTINE MESYLATE 5 MILLIGRAM(S): 5 CAPSULE ORAL at 11:31

## 2019-10-06 RX ADMIN — ALBUTEROL 2.5 MILLIGRAM(S): 90 AEROSOL, METERED ORAL at 04:01

## 2019-10-06 RX ADMIN — SODIUM CHLORIDE 42 MILLILITER(S): 9 INJECTION, SOLUTION INTRAVENOUS at 11:32

## 2019-10-06 RX ADMIN — BROMOCRIPTINE MESYLATE 5 MILLIGRAM(S): 5 CAPSULE ORAL at 05:08

## 2019-10-06 RX ADMIN — Medication 300 MILLIGRAM(S): at 08:53

## 2019-10-06 RX ADMIN — MODAFINIL 100 MILLIGRAM(S): 200 TABLET ORAL at 11:31

## 2019-10-06 RX ADMIN — ALBUTEROL 2.5 MILLIGRAM(S): 90 AEROSOL, METERED ORAL at 15:40

## 2019-10-06 RX ADMIN — Medication 650 MILLIGRAM(S): at 11:31

## 2019-10-06 RX ADMIN — Medication 650 MILLIGRAM(S): at 13:00

## 2019-10-06 RX ADMIN — Medication 166.67 MILLIGRAM(S): at 00:45

## 2019-10-06 RX ADMIN — Medication 166.67 MILLIGRAM(S): at 14:26

## 2019-10-06 RX ADMIN — PRIMIDONE 50 MILLIGRAM(S): 250 TABLET ORAL at 17:06

## 2019-10-06 RX ADMIN — PRIMIDONE 50 MILLIGRAM(S): 250 TABLET ORAL at 05:08

## 2019-10-06 RX ADMIN — CHLORHEXIDINE GLUCONATE 1 APPLICATION(S): 213 SOLUTION TOPICAL at 11:32

## 2019-10-06 RX ADMIN — SCOPALAMINE 1 PATCH: 1 PATCH, EXTENDED RELEASE TRANSDERMAL at 22:15

## 2019-10-06 NOTE — PROGRESS NOTE ADULT - SUBJECTIVE AND OBJECTIVE BOX
24 HOUR EVENTS: No acute events overnight.  Still requiring frequent NT suctioning.  Remains on venti mask tolearting well.  Unchanged neurological exam. Tolerating tube feeds. UOP adequate 75/hr.      SUBJECTIVE/ROS:  [ ] A ten-point review of systems was otherwise negative except as noted.  [x ] Due to altered mental status/intubation, subjective information were not able to be obtained from the patient. History was obtained, to the extent possible, from review of the chart and collateral sources of information.      NEURO  RASS:     GCS:     [facility] ICU:  Exam: see below   Meds: acetaminophen    Suspension .. 650 milliGRAM(s) Oral every 6 hours PRN Temp greater or equal to 38C (100.4F)  bromocriptine Tablet 5 milliGRAM(s) Oral <User Schedule>  melatonin 3 milliGRAM(s) Oral at bedtime  modafinil 100 milliGRAM(s) Oral daily  primidone 50 milliGRAM(s) Oral two times a day    [x] Adequacy of sedation and pain control has been assessed and adjusted      RESPIRATORY  RR: 19 (10-06-19 @ 12:00) (19 - 44)  SpO2: 96% (10-06-19 @ 12:00) (95% - 100%)  Wt(kg): --  Exam: unlabored, see below   Mechanical Ventilation:     [ ] Extubation Readiness Assessed  Meds: ALBUTerol    0.083% 2.5 milliGRAM(s) Nebulizer every 6 hours        CARDIOVASCULAR  HR: 58 (10-06-19 @ 12:00) (56 - 78)  BP: 133/63 (10-06-19 @ 12:00) (125/69 - 184/77)  BP(mean): 90 (10-06-19 @ 12:00) (89 - 125)  ABP: --  ABP(mean): --  Wt(kg): --  CVP(cm H2O): --      Exam: NSR    Cardiac Rhythm: RRR  Perfusion     [x ]Adequate   [ ]Inadequate  Mentation   [x ]Normal       [ ]Reduced  Extremities  [ x]Warm         [ ]Cool  Volume Status [ ]Hypervolemic [x ]Euvolemic [ ]Hypovolemic  Meds: propranolol 20 milliGRAM(s) Oral every 6 hours        GI/NUTRITION  Exam: soft, nttp, nd  Diet: TF at goal   Meds: pantoprazole  Injectable 40 milliGRAM(s) IV Push daily      GENITOURINARY  I&O's Detail    10-05 @ 07:01  -  10-06 @ 07:00  --------------------------------------------------------  IN:    Enteral Tube Flush: 200 mL    multiple electrolytes Injection Type 1: 714 mL    Pivot 1.5: 1410 mL    Solution: 150 mL    Solution: 500 mL  Total IN: 2974 mL    OUT:    Indwelling Catheter - Urethral: 1680 mL  Total OUT: 1680 mL    Total NET: 1294 mL      10-06 @ 07:01  -  10-06 @ 13:51  --------------------------------------------------------  IN:    multiple electrolytes Injection Type 1: 168 mL    Pivot 1.5: 240 mL    Solution: 50 mL  Total IN: 458 mL    OUT:    Indwelling Catheter - Urethral: 130 mL  Total OUT: 130 mL    Total NET: 328 mL          10-06    137  |  99  |  32.0<H>  ----------------------------<  131<H>  4.6   |  27.0  |  0.73    Ca    8.3<L>      06 Oct 2019 04:18  Phos  2.7     10-06  Mg     2.3     10-06      [ x] Morris catheter, indication: Neurogenic   Meds: multiple electrolytes Injection Type 1 1000 milliLiter(s) IV Continuous <Continuous>    PHYSICAL EXAM:    Gen: NAD, laying in bed, on venti    Eyes: PERRLA, 3 mm, EOMI, anicteric, conjunctiva pink, opening eyes to command    Neurological: 5/5  B/L, not following commands for UE/LE movement otherwise, expressive aphasia    ENMT: neck supple, trachea midline, MMM    Pulmonary: rhonchi upper fields    Cardiovascular: NSR, S1/S2 present, no mrg    Gastrointestinal: abdomen soft NT/ND, no recound/guarding    Extremities: no cyanosis, no edema      HEMATOLOGIC  Meds: enoxaparin Injectable 40 milliGRAM(s) SubCutaneous daily    [x] VTE Prophylaxis                        10.8   13.23 )-----------( 216      ( 06 Oct 2019 04:18 )             33.5       Transfusion     [ ] PRBC   [ ] Platelets   [ ] FFP   [ ] Cryoprecipitate      INFECTIOUS DISEASES  T(C): 38.2 (10-06-19 @ 12:15), Max: 38.4 (10-05-19 @ 16:00)  Wt(kg): --  WBC Count: 13.23 K/uL (10-06 @ 04:18)    Recent Cultures:  Specimen Source: .Sputum, 10-02 @ 14:11; Results   Moderate Enterobacter cloacae  Numerous Routine respiratory maine present; Gram Stain:   Moderate White blood cells  Few Gram Negative Rods  Few Gram Positive Cocci in Pairs and Chains  Few Gram Positive Rods; Organism: Enterobacter cloacae  Specimen Source: .CSF, 10-01 @ 18:13; Results   No growth at 3 days.; Gram Stain:   Few White blood cells  No organisms seen; Organism: --  Specimen Source: .Blood, 10-01 @ 10:00; Results   No growth at 5 days.; Gram Stain: --; Organism: --    Meds: cefepime   IVPB 2000 milliGRAM(s) IV Intermittent every 8 hours  cefepime   IVPB      tobramycin for Nebulization 300 milliGRAM(s) Inhalation every 12 hours  vancomycin  IVPB 1250 milliGRAM(s) IV Intermittent <User Schedule>        ENDOCRINE  Capillary Blood Glucose    Meds:       ACCESS DEVICES:  [ ] Peripheral IV  [ ] Central Venous Line	[ ] R	[ ] L	[ ] IJ	[ ] Fem	[ ] SC	Placed:   [ ] Arterial Line		[ ] R	[ ] L	[ ] Fem	[ ] Rad	[ ] Ax	Placed:   [ ] PICC:					[ ] Mediport  [ ] Urinary Catheter, Date Placed:   [ ] Necessity of urinary, arterial, and venous catheters discussed    OTHER MEDICATIONS:  chlorhexidine 2% Cloths 1 Application(s) Topical daily      CODE STATUS: Yes      IMAGING:

## 2019-10-06 NOTE — PROGRESS NOTE ADULT - ASSESSMENT
ASSESSMENT/PLAN:  84yMale presenting with large L basal ganglia hemorrahge stroke     Neuro: continue q4 neuro as per nsg, no further imaging at this time as per nsg. Avoid deliriogenic meds. OOB to chair at daytimes hours.     CV: Cont hemodynamic monitoring. continue lopressor- -180    Pulm: DNI. Frequent NT suctioning. venti @40%, continue pulm toilet, suction q2 via NPA, percussion    GI/Nutrition: TF @60 goal     /Renal: montejo in place with coude for urinary retention likely neurogenic    ID: HAP- enterbacter cont Vanco/Cefepime, Tobra nebs.  Blood cultures negative. Tx fevers w/ tylenol     Endo: none    Skin: frequent bed shifts    Proph: lovenox    Dispo: SICU

## 2019-10-06 NOTE — PROGRESS NOTE ADULT - SUBJECTIVE AND OBJECTIVE BOX
[facility]                                        Neurology at [facility]                                 Frieda Saunders, & Jose                                  [address]                                        [place], [place], [address]                                             [phone]        CC: intracranial hemorrhage     HPI:  The patient is a 84y Male who presented as a transfer to [facility] for management of ICH.  He apparently may have had seizure and right sided weakness and was brought to [facility].  The patient was transferred to [facility] for intracranial hemorrhage management and neurosurgical eval.  He was intubated for transfer.  An extraventricular drain was placed. (27 Sep 2019 17:30).    Interim history: In SICU, out of bed in chair now, eyes closed, minimally follows commands, EVD removed    ROS:  Unobtainable due to patient's condition.     MEDICATIONS  (STANDING):  ALBUTerol    0.083% 2.5 milliGRAM(s) Nebulizer every 6 hours  bromocriptine Tablet 5 milliGRAM(s) Oral <User Schedule>  cefepime   IVPB 2000 milliGRAM(s) IV Intermittent every 8 hours  cefepime   IVPB      chlorhexidine 2% Cloths 1 Application(s) Topical daily  enoxaparin Injectable 40 milliGRAM(s) SubCutaneous daily  melatonin 3 milliGRAM(s) Oral at bedtime  modafinil 100 milliGRAM(s) Oral daily  multiple electrolytes Injection Type 1 1000 milliLiter(s) (42 mL/Hr) IV Continuous <Continuous>  pantoprazole  Injectable 40 milliGRAM(s) IV Push daily  primidone 50 milliGRAM(s) Oral two times a day  propranolol 20 milliGRAM(s) Oral every 6 hours  tobramycin for Nebulization 300 milliGRAM(s) Inhalation every 12 hours  vancomycin  IVPB 1250 milliGRAM(s) IV Intermittent <User Schedule>    MEDICATIONS  (PRN):  acetaminophen    Suspension .. 650 milliGRAM(s) Oral every 6 hours PRN Temp greater or equal to 38C (100.4F)      Vital Signs Last 24 Hrs  T(C): 37.7 (06 Oct 2019 08:00), Max: 38.5 (05 Oct 2019 12:00)  T(F): 99.9 (06 Oct 2019 08:00), Max: 101.3 (05 Oct 2019 12:00)  HR: 58 (06 Oct 2019 10:00) (56 - 78)  BP: 141/65 (06 Oct 2019 10:00) (125/69 - 184/77)  BP(mean): 94 (06 Oct 2019 10:00) (89 - 125)  RR: 20 (06 Oct 2019 10:00) (20 - 44)  SpO2: 95% (06 Oct 2019 10:00) (93% - 100%)    Detailed Neurologic Exam:    Mental status: The patient is awake but non verbal. He forces eyes shut.  following simple  instructions like squeeze/release hands      Cranial nerves: Pupils equal and react symmetrically to light. Extraocular motion is difficult to assess as he does not open eyes/keep eyes open. Facial musculature is asymmetric with central right weakness. Palate and tongue are difficult to evaluate.     Motor/sensory: There is normal bulk and tone.  There is no tremor.  Moving right minimally (3/5) to stimuli.  Moving left 5/5 to stimuli.     Reflexes: diminished throughout and plantar responses are flexor left, extensor right.    Cerebellar: Cannot be assessed.     Labs:                           10.8   13.23 )-----------( 216      ( 06 Oct 2019 04:18 )             33.5     10-06    137  |  99  |  32.0<H>  ----------------------------<  131<H>  4.6   |  27.0  |  0.73    Ca    8.3<L>      06 Oct 2019 04:18  Phos  2.7     10-06  Mg     2.3     10-06      Rad:   CT head 10/5- stable left BG ICH and IVH. no mass no sig HCP  CT head 10/4- left BG, slightly smaller, mild improved IVH, no hydrocephalus, no mass  CT head 10/2- stable left BG ICH/ IVH in b/l lat vents, 3/4vent ICH improved, small b/l midline frontoparietal SAH  Repeat CT 9/30 . There is stable left basal ganglia intracranial hemorrhage with slightly improved intraventricular hemorrhage. There is some new trace bilateral subarachnoid hemorrhage in the frontal/parietal regions. Ventricular size is stable.

## 2019-10-06 NOTE — PROGRESS NOTE ADULT - ASSESSMENT
84y Male who is followed by neurology because of ICH    ICH  Likely hypertensive.  Avoid anti platelet medications.  Avoid anti coagulant medications.  Off keppra  Repeat head CT 10/5 grossly stable BG ICH and IVH, no hydrocephalus  PT/OT when tolerated, advance activity as tolerated    Hypertension   Control blood pressure.     Guarded prognosis for meaningful recovery    will follow with you    Oscar Malave MD PhD   [phone]

## 2019-10-07 LAB
ANION GAP SERPL CALC-SCNC: 11 MMOL/L — SIGNIFICANT CHANGE UP (ref 5–17)
BUN SERPL-MCNC: 27 MG/DL — HIGH (ref 8–20)
CALCIUM SERPL-MCNC: 8.2 MG/DL — LOW (ref 8.6–10.2)
CHLORIDE SERPL-SCNC: 96 MMOL/L — LOW (ref 98–107)
CO2 SERPL-SCNC: 27 MMOL/L — SIGNIFICANT CHANGE UP (ref 22–29)
CREAT SERPL-MCNC: 0.75 MG/DL — SIGNIFICANT CHANGE UP (ref 0.5–1.3)
GLUCOSE BLDC GLUCOMTR-MCNC: 139 MG/DL — HIGH (ref 70–99)
GLUCOSE BLDC GLUCOMTR-MCNC: 140 MG/DL — HIGH (ref 70–99)
GLUCOSE SERPL-MCNC: 161 MG/DL — HIGH (ref 70–115)
HCT VFR BLD CALC: 35.2 % — LOW (ref 39–50)
HGB BLD-MCNC: 11.3 G/DL — LOW (ref 13–17)
MAGNESIUM SERPL-MCNC: 2.3 MG/DL — SIGNIFICANT CHANGE UP (ref 1.6–2.6)
MCHC RBC-ENTMCNC: 31.2 PG — SIGNIFICANT CHANGE UP (ref 27–34)
MCHC RBC-ENTMCNC: 32.1 GM/DL — SIGNIFICANT CHANGE UP (ref 32–36)
MCV RBC AUTO: 97.2 FL — SIGNIFICANT CHANGE UP (ref 80–100)
PHOSPHATE SERPL-MCNC: 2.7 MG/DL — SIGNIFICANT CHANGE UP (ref 2.4–4.7)
PLATELET # BLD AUTO: 203 K/UL — SIGNIFICANT CHANGE UP (ref 150–400)
POTASSIUM SERPL-MCNC: 4.5 MMOL/L — SIGNIFICANT CHANGE UP (ref 3.5–5.3)
POTASSIUM SERPL-SCNC: 4.5 MMOL/L — SIGNIFICANT CHANGE UP (ref 3.5–5.3)
RBC # BLD: 3.62 M/UL — LOW (ref 4.2–5.8)
RBC # FLD: 14.3 % — SIGNIFICANT CHANGE UP (ref 10.3–14.5)
SODIUM SERPL-SCNC: 134 MMOL/L — LOW (ref 135–145)
WBC # BLD: 12.12 K/UL — HIGH (ref 3.8–10.5)
WBC # FLD AUTO: 12.12 K/UL — HIGH (ref 3.8–10.5)

## 2019-10-07 PROCEDURE — 99233 SBSQ HOSP IP/OBS HIGH 50: CPT | Mod: GC

## 2019-10-07 PROCEDURE — 99232 SBSQ HOSP IP/OBS MODERATE 35: CPT

## 2019-10-07 PROCEDURE — 70450 CT HEAD/BRAIN W/O DYE: CPT | Mod: 26

## 2019-10-07 PROCEDURE — 99233 SBSQ HOSP IP/OBS HIGH 50: CPT

## 2019-10-07 PROCEDURE — 71045 X-RAY EXAM CHEST 1 VIEW: CPT | Mod: 26

## 2019-10-07 PROCEDURE — 99291 CRITICAL CARE FIRST HOUR: CPT

## 2019-10-07 RX ORDER — DOXAZOSIN MESYLATE 4 MG
1 TABLET ORAL AT BEDTIME
Refills: 0 | Status: DISCONTINUED | OUTPATIENT
Start: 2019-10-07 | End: 2019-10-14

## 2019-10-07 RX ORDER — SCOPALAMINE 1 MG/3D
1 PATCH, EXTENDED RELEASE TRANSDERMAL
Refills: 0 | Status: DISCONTINUED | OUTPATIENT
Start: 2019-10-07 | End: 2019-10-15

## 2019-10-07 RX ADMIN — BROMOCRIPTINE MESYLATE 5 MILLIGRAM(S): 5 CAPSULE ORAL at 14:26

## 2019-10-07 RX ADMIN — PANTOPRAZOLE SODIUM 40 MILLIGRAM(S): 20 TABLET, DELAYED RELEASE ORAL at 11:42

## 2019-10-07 RX ADMIN — Medication 166.67 MILLIGRAM(S): at 01:32

## 2019-10-07 RX ADMIN — CEFEPIME 100 MILLIGRAM(S): 1 INJECTION, POWDER, FOR SOLUTION INTRAMUSCULAR; INTRAVENOUS at 00:13

## 2019-10-07 RX ADMIN — Medication 650 MILLIGRAM(S): at 00:20

## 2019-10-07 RX ADMIN — ENOXAPARIN SODIUM 40 MILLIGRAM(S): 100 INJECTION SUBCUTANEOUS at 12:19

## 2019-10-07 RX ADMIN — CEFEPIME 100 MILLIGRAM(S): 1 INJECTION, POWDER, FOR SOLUTION INTRAMUSCULAR; INTRAVENOUS at 14:03

## 2019-10-07 RX ADMIN — Medication 650 MILLIGRAM(S): at 18:38

## 2019-10-07 RX ADMIN — ALBUTEROL 2.5 MILLIGRAM(S): 90 AEROSOL, METERED ORAL at 08:36

## 2019-10-07 RX ADMIN — PRIMIDONE 50 MILLIGRAM(S): 250 TABLET ORAL at 18:00

## 2019-10-07 RX ADMIN — Medication 166.67 MILLIGRAM(S): at 12:15

## 2019-10-07 RX ADMIN — Medication 650 MILLIGRAM(S): at 09:17

## 2019-10-07 RX ADMIN — Medication 300 MILLIGRAM(S): at 20:24

## 2019-10-07 RX ADMIN — Medication 300 MILLIGRAM(S): at 08:34

## 2019-10-07 RX ADMIN — Medication 650 MILLIGRAM(S): at 18:00

## 2019-10-07 RX ADMIN — Medication 1 MILLIGRAM(S): at 21:17

## 2019-10-07 RX ADMIN — CHLORHEXIDINE GLUCONATE 1 APPLICATION(S): 213 SOLUTION TOPICAL at 11:56

## 2019-10-07 RX ADMIN — ALBUTEROL 2.5 MILLIGRAM(S): 90 AEROSOL, METERED ORAL at 15:21

## 2019-10-07 RX ADMIN — SCOPALAMINE 1 PATCH: 1 PATCH, EXTENDED RELEASE TRANSDERMAL at 09:55

## 2019-10-07 RX ADMIN — Medication 650 MILLIGRAM(S): at 01:34

## 2019-10-07 RX ADMIN — Medication 3 MILLIGRAM(S): at 00:13

## 2019-10-07 RX ADMIN — Medication 650 MILLIGRAM(S): at 09:52

## 2019-10-07 RX ADMIN — PRIMIDONE 50 MILLIGRAM(S): 250 TABLET ORAL at 05:22

## 2019-10-07 RX ADMIN — Medication 3 MILLIGRAM(S): at 21:17

## 2019-10-07 RX ADMIN — MODAFINIL 100 MILLIGRAM(S): 200 TABLET ORAL at 14:26

## 2019-10-07 RX ADMIN — CEFEPIME 100 MILLIGRAM(S): 1 INJECTION, POWDER, FOR SOLUTION INTRAMUSCULAR; INTRAVENOUS at 21:17

## 2019-10-07 RX ADMIN — SCOPALAMINE 1 PATCH: 1 PATCH, EXTENDED RELEASE TRANSDERMAL at 21:12

## 2019-10-07 RX ADMIN — CEFEPIME 100 MILLIGRAM(S): 1 INJECTION, POWDER, FOR SOLUTION INTRAMUSCULAR; INTRAVENOUS at 05:22

## 2019-10-07 RX ADMIN — BROMOCRIPTINE MESYLATE 5 MILLIGRAM(S): 5 CAPSULE ORAL at 05:22

## 2019-10-07 RX ADMIN — ALBUTEROL 2.5 MILLIGRAM(S): 90 AEROSOL, METERED ORAL at 04:00

## 2019-10-07 RX ADMIN — Medication 62.5 MILLIMOLE(S): at 08:34

## 2019-10-07 RX ADMIN — ALBUTEROL 2.5 MILLIGRAM(S): 90 AEROSOL, METERED ORAL at 20:24

## 2019-10-07 NOTE — PROGRESS NOTE ADULT - ASSESSMENT
84y Male who is followed by neurology because of ICH    ICH  Likely hypertensive.  Avoid anti platelet medications.  Avoid anti coagulant medications.  Off Keppra  Repeat head CT 10/5 grossly stable basal ganglia ICH and intraventricular hemorrhage, no hydrocephalus.  Now DNR.    Hypertension   Control blood pressure.     Case discussed with SICU team. Dr Faust attending.

## 2019-10-07 NOTE — PROGRESS NOTE ADULT - SUBJECTIVE AND OBJECTIVE BOX
INTERVAL HPI/OVERNIGHT EVENTS/SUBJECTIVE:    ICU Vital Signs Last 24 Hrs  T(C): 37.6 (07 Oct 2019 04:00), Max: 38.2 (06 Oct 2019 12:15)  T(F): 99.7 (07 Oct 2019 04:00), Max: 100.8 (06 Oct 2019 12:15)  HR: 60 (07 Oct 2019 07:00) (52 - 70)  BP: 160/63 (07 Oct 2019 07:00) (114/57 - 219/154)  BP(mean): 91 (07 Oct 2019 07:00) (81 - 159)  ABP: --  ABP(mean): --  RR: 20 (07 Oct 2019 07:00) (18 - 44)  SpO2: 96% (07 Oct 2019 07:00) (93% - 100%)      I&O's Detail    06 Oct 2019 07:01  -  07 Oct 2019 07:00  --------------------------------------------------------  IN:    Enteral Tube Flush: 350 mL    multiple electrolytes Injection Type 1: 1008 mL    Pivot 1.5: 1380 mL    Solution: 50 mL    Solution: 250 mL    Solution: 250 mL    Solution: 150 mL  Total IN: 3438 mL    OUT:    Indwelling Catheter - Urethral: 2080 mL  Total OUT: 2080 mL    Total NET: 1358 mL                MEDICATIONS  (STANDING):  ALBUTerol    0.083% 2.5 milliGRAM(s) Nebulizer every 6 hours  bromocriptine Tablet 5 milliGRAM(s) Oral <User Schedule>  cefepime   IVPB 2000 milliGRAM(s) IV Intermittent every 8 hours  cefepime   IVPB      chlorhexidine 2% Cloths 1 Application(s) Topical daily  enoxaparin Injectable 40 milliGRAM(s) SubCutaneous daily  melatonin 3 milliGRAM(s) Oral at bedtime  modafinil 100 milliGRAM(s) Oral daily  multiple electrolytes Injection Type 1 1000 milliLiter(s) (42 mL/Hr) IV Continuous <Continuous>  pantoprazole  Injectable 40 milliGRAM(s) IV Push daily  primidone 50 milliGRAM(s) Oral two times a day  propranolol 20 milliGRAM(s) Oral every 6 hours  scopolamine   Patch 1 Patch Transdermal every 72 hours  scopolamine   Patch 1 Patch Transdermal every 72 hours  sodium phosphate IVPB 15 milliMole(s) IV Intermittent once  tobramycin for Nebulization 300 milliGRAM(s) Inhalation every 12 hours  vancomycin  IVPB 1250 milliGRAM(s) IV Intermittent <User Schedule>    MEDICATIONS  (PRN):  acetaminophen    Suspension .. 650 milliGRAM(s) Oral every 6 hours PRN Temp greater or equal to 38C (100.4F)      NUTRITION/IVF:     CENTRAL LINE:  LOCATION:   DATE INSERTED:  CVP:  SCVO2:    CASTELLANOS:   DATE INSERTED:    A-LINE:    LOCATION:   DATE INSERTED:   SVV:  CO/CI:     CHEST TUBE:  LOCATION:  DATE INSERTED: OUTPUT/24 HRS:  SUCTION/WATER SEAL:     NG/OG TUBE:  DATE INSERTED:  OUTPUT/24 HRS:    MISC:     PHYSICAL EXAM:    Gen:    Eyes:    Neurological:    ENMT:    Neck:    Pulmonary:    Cardiovascular:    Gastrointestinal:    Genitourinary:    Back:    Extremities:    Skin:    Musculoskeletal:          LABS:  CBC Full  -  ( 07 Oct 2019 04:53 )  WBC Count : 12.12 K/uL  RBC Count : 3.62 M/uL  Hemoglobin : 11.3 g/dL  Hematocrit : 35.2 %  Platelet Count - Automated : 203 K/uL  Mean Cell Volume : 97.2 fl  Mean Cell Hemoglobin : 31.2 pg  Mean Cell Hemoglobin Concentration : 32.1 gm/dL  Auto Neutrophil # : x  Auto Lymphocyte # : x  Auto Monocyte # : x  Auto Eosinophil # : x  Auto Basophil # : x  Auto Neutrophil % : x  Auto Lymphocyte % : x  Auto Monocyte % : x  Auto Eosinophil % : x  Auto Basophil % : x    10-07    134<L>  |  96<L>  |  27.0<H>  ----------------------------<  161<H>  4.5   |  27.0  |  0.75    Ca    8.2<L>      07 Oct 2019 04:53  Phos  2.7     10-07  Mg     2.3     10-07          RECENT CULTURES:  10-02 .Sputum Enterobacter cloacae   Moderate White blood cells  Few Gram Negative Rods  Few Gram Positive Cocci in Pairs and Chains  Few Gram Positive Rods   Moderate Enterobacter cloacae  Numerous Routine respiratory maine present    10-01 .CSF XXXX   Few White blood cells  No organisms seen   No growth at 3 days.    10-01 .Blood XXXX XXXX   No growth at 5 days.              CAPILLARY BLOOD GLUCOSE      RADIOLOGY & ADDITIONAL STUDIES:    ASSESSMENT/PLAN:  84yMale presenting with:    Neuro:    CV:    Pulm:    GI/Nutrition:    /Renal:    ID:    Lines/Tubes:    Endo:    Skin:    Proph:    Dispo:      CRITICAL CARE TIME SPENT: INTERVAL HPI/OVERNIGHT EVENTS/SUBJECTIVE:    ICU Vital Signs Last 24 Hrs  T(C): 37.6 (07 Oct 2019 04:00), Max: 38.2 (06 Oct 2019 12:15)  T(F): 99.7 (07 Oct 2019 04:00), Max: 100.8 (06 Oct 2019 12:15)  HR: 60 (07 Oct 2019 07:00) (52 - 70)  BP: 160/63 (07 Oct 2019 07:00) (114/57 - 219/154)  BP(mean): 91 (07 Oct 2019 07:00) (81 - 159)  ABP: --  ABP(mean): --  RR: 20 (07 Oct 2019 07:00) (18 - 44)  SpO2: 96% (07 Oct 2019 07:00) (93% - 100%)      I&O's Detail    06 Oct 2019 07:01  -  07 Oct 2019 07:00  --------------------------------------------------------  IN:    Enteral Tube Flush: 350 mL    multiple electrolytes Injection Type 1: 1008 mL    Pivot 1.5: 1380 mL    Solution: 50 mL    Solution: 250 mL    Solution: 250 mL    Solution: 150 mL  Total IN: 3438 mL    OUT:    Indwelling Catheter - Urethral: 2080 mL  Total OUT: 2080 mL    Total NET: 1358 mL                MEDICATIONS  (STANDING):  ALBUTerol    0.083% 2.5 milliGRAM(s) Nebulizer every 6 hours  bromocriptine Tablet 5 milliGRAM(s) Oral <User Schedule>  cefepime   IVPB 2000 milliGRAM(s) IV Intermittent every 8 hours  cefepime   IVPB      chlorhexidine 2% Cloths 1 Application(s) Topical daily  enoxaparin Injectable 40 milliGRAM(s) SubCutaneous daily  melatonin 3 milliGRAM(s) Oral at bedtime  modafinil 100 milliGRAM(s) Oral daily  multiple electrolytes Injection Type 1 1000 milliLiter(s) (42 mL/Hr) IV Continuous <Continuous>  pantoprazole  Injectable 40 milliGRAM(s) IV Push daily  primidone 50 milliGRAM(s) Oral two times a day  propranolol 20 milliGRAM(s) Oral every 6 hours  scopolamine   Patch 1 Patch Transdermal every 72 hours  scopolamine   Patch 1 Patch Transdermal every 72 hours  sodium phosphate IVPB 15 milliMole(s) IV Intermittent once  tobramycin for Nebulization 300 milliGRAM(s) Inhalation every 12 hours  vancomycin  IVPB 1250 milliGRAM(s) IV Intermittent <User Schedule>    MEDICATIONS  (PRN):  acetaminophen    Suspension .. 650 milliGRAM(s) Oral every 6 hours PRN Temp greater or equal to 38C (100.4F)      NUTRITION/IVF:     CENTRAL LINE:  LOCATION:   DATE INSERTED:  CVP:  SCVO2:    MONTEJO:   YES DATE INSERTED: 10/4/2019    A-LINE:    LOCATION:   DATE INSERTED:   SVV:  CO/CI:     CHEST TUBE:  LOCATION:  DATE INSERTED: OUTPUT/24 HRS:  SUCTION/WATER SEAL:     NG/OG TUBE:  DATE INSERTED:  OUTPUT/24 HRS:    MISC:     PHYSICAL EXAM:    Gen: NAD, laying in bed, mouth closed, breathing through nose    Eyes: PERRLA, conjuctiva pink, anicteric, left eye more difficult to exam due to pt refusing to open    Neurological: not following commands, reacts to stimulus, does not move LE B/L, babinski absent    ENMT: mmm, two staples overlying well healing surgical scar right scalp, nasal trumpet in place, dobhoff in place, on venti mask    Neck: supple, trachea midline     Pulmonary: rhonchi in upper fields B/L, improving, no increased WOB, no accessory muscle use    Cardiovascular: NSR, S1/S2 present, no mrg, cap refill <2sec    Gastrointestinal: +bs, abdomen soft, mildly distended, obese    Genitourinary: montejo in place, urine light in color, no gross hematuria    Extremities: no edema, no cyanosis    Skin: warm, dry, intact          LABS:  CBC Full  -  ( 07 Oct 2019 04:53 )  WBC Count : 12.12 K/uL  RBC Count : 3.62 M/uL  Hemoglobin : 11.3 g/dL  Hematocrit : 35.2 %  Platelet Count - Automated : 203 K/uL  Mean Cell Volume : 97.2 fl  Mean Cell Hemoglobin : 31.2 pg  Mean Cell Hemoglobin Concentration : 32.1 gm/dL  Auto Neutrophil # : x  Auto Lymphocyte # : x  Auto Monocyte # : x  Auto Eosinophil # : x  Auto Basophil # : x  Auto Neutrophil % : x  Auto Lymphocyte % : x  Auto Monocyte % : x  Auto Eosinophil % : x  Auto Basophil % : x    10-07    134<L>  |  96<L>  |  27.0<H>  ----------------------------<  161<H>  4.5   |  27.0  |  0.75    Ca    8.2<L>      07 Oct 2019 04:53  Phos  2.7     10-07  Mg     2.3     10-07          RECENT CULTURES:  10-02 .Sputum Enterobacter cloacae   Moderate White blood cells  Few Gram Negative Rods  Few Gram Positive Cocci in Pairs and Chains  Few Gram Positive Rods   Moderate Enterobacter cloacae  Numerous Routine respiratory maine present    10-01 .CSF XXXX   Few White blood cells  No organisms seen   No growth at 3 days.    10-01 .Blood XXXX XXXX   No growth at 5 days.              CAPILLARY BLOOD GLUCOSE      RADIOLOGY & ADDITIONAL STUDIES:    ASSESSMENT/PLAN:  84yMale presenting with:    Neuro:    CV:    Pulm:    GI/Nutrition:    /Renal:    ID:    Lines/Tubes:    Endo:    Skin:    Proph:    Dispo:      CRITICAL CARE TIME SPENT: INTERVAL HPI/OVERNIGHT EVENTS/SUBJECTIVE:    ICU Vital Signs Last 24 Hrs  T(C): 37.6 (07 Oct 2019 04:00), Max: 38.2 (06 Oct 2019 12:15)  T(F): 99.7 (07 Oct 2019 04:00), Max: 100.8 (06 Oct 2019 12:15)  HR: 60 (07 Oct 2019 07:00) (52 - 70)  BP: 160/63 (07 Oct 2019 07:00) (114/57 - 219/154)  BP(mean): 91 (07 Oct 2019 07:00) (81 - 159)  ABP: --  ABP(mean): --  RR: 20 (07 Oct 2019 07:00) (18 - 44)  SpO2: 96% (07 Oct 2019 07:00) (93% - 100%)      I&O's Detail    06 Oct 2019 07:01  -  07 Oct 2019 07:00  --------------------------------------------------------  IN:    Enteral Tube Flush: 350 mL    multiple electrolytes Injection Type 1: 1008 mL    Pivot 1.5: 1380 mL    Solution: 50 mL    Solution: 250 mL    Solution: 250 mL    Solution: 150 mL  Total IN: 3438 mL    OUT:    Indwelling Catheter - Urethral: 2080 mL  Total OUT: 2080 mL    Total NET: 1358 mL                MEDICATIONS  (STANDING):  ALBUTerol    0.083% 2.5 milliGRAM(s) Nebulizer every 6 hours  bromocriptine Tablet 5 milliGRAM(s) Oral <User Schedule>  cefepime   IVPB 2000 milliGRAM(s) IV Intermittent every 8 hours  cefepime   IVPB      chlorhexidine 2% Cloths 1 Application(s) Topical daily  enoxaparin Injectable 40 milliGRAM(s) SubCutaneous daily  melatonin 3 milliGRAM(s) Oral at bedtime  modafinil 100 milliGRAM(s) Oral daily  multiple electrolytes Injection Type 1 1000 milliLiter(s) (42 mL/Hr) IV Continuous <Continuous>  pantoprazole  Injectable 40 milliGRAM(s) IV Push daily  primidone 50 milliGRAM(s) Oral two times a day  propranolol 20 milliGRAM(s) Oral every 6 hours  scopolamine   Patch 1 Patch Transdermal every 72 hours  scopolamine   Patch 1 Patch Transdermal every 72 hours  sodium phosphate IVPB 15 milliMole(s) IV Intermittent once  tobramycin for Nebulization 300 milliGRAM(s) Inhalation every 12 hours  vancomycin  IVPB 1250 milliGRAM(s) IV Intermittent <User Schedule>    MEDICATIONS  (PRN):  acetaminophen    Suspension .. 650 milliGRAM(s) Oral every 6 hours PRN Temp greater or equal to 38C (100.4F)      NUTRITION/IVF:     CENTRAL LINE:  LOCATION:   DATE INSERTED:  CVP:  SCVO2:    MONTEJO:   YES DATE INSERTED: 10/4/2019    A-LINE:    LOCATION:   DATE INSERTED:   SVV:  CO/CI:     CHEST TUBE:  LOCATION:  DATE INSERTED: OUTPUT/24 HRS:  SUCTION/WATER SEAL:     NG/OG TUBE:  DATE INSERTED:  OUTPUT/24 HRS:    MISC:     PHYSICAL EXAM:    Gen: NAD, laying in bed, mouth closed, breathing through nose    Eyes: PERRLA, conjuctiva pink, anicteric, left eye more difficult to exam due to pt refusing to open    Neurological: not following commands, reacts to stimulus, does not move LE B/L, babinski absent    ENMT: mmm, two staples overlying well healing surgical scar right scalp, nasal trumpet in place, dobhoff in place, on venti mask    Neck: supple, trachea midline     Pulmonary: rhonchi in upper fields B/L, improving, no increased WOB, no accessory muscle use    Cardiovascular: NSR, S1/S2 present, no mrg, cap refill <2sec    Gastrointestinal: +bs, abdomen soft, mildly distended, obese    Genitourinary: montejo in place, urine light in color, no gross hematuria    Extremities: no edema, no cyanosis    Skin: warm, dry, intact          LABS:  CBC Full  -  ( 07 Oct 2019 04:53 )  WBC Count : 12.12 K/uL  RBC Count : 3.62 M/uL  Hemoglobin : 11.3 g/dL  Hematocrit : 35.2 %  Platelet Count - Automated : 203 K/uL  Mean Cell Volume : 97.2 fl  Mean Cell Hemoglobin : 31.2 pg  Mean Cell Hemoglobin Concentration : 32.1 gm/dL  Auto Neutrophil # : x  Auto Lymphocyte # : x  Auto Monocyte # : x  Auto Eosinophil # : x  Auto Basophil # : x  Auto Neutrophil % : x  Auto Lymphocyte % : x  Auto Monocyte % : x  Auto Eosinophil % : x  Auto Basophil % : x    10-07    134<L>  |  96<L>  |  27.0<H>  ----------------------------<  161<H>  4.5   |  27.0  |  0.75    Ca    8.2<L>      07 Oct 2019 04:53  Phos  2.7     10-07  Mg     2.3     10-07          RECENT CULTURES:  10-02 .Sputum Enterobacter cloacae   Moderate White blood cells  Few Gram Negative Rods  Few Gram Positive Cocci in Pairs and Chains  Few Gram Positive Rods   Moderate Enterobacter cloacae  Numerous Routine respiratory maine present    10-01 .CSF XXXX   Few White blood cells  No organisms seen   No growth at 3 days.    10-01 .Blood XXXX XXXX   No growth at 5 days.              CAPILLARY BLOOD GLUCOSE      RADIOLOGY & ADDITIONAL STUDIES:    ASSESSMENT/PLAN:  84yMale presenting with:    Neuro: CT Head, pt not participating in exam as before, continue q4 neuro checks, OOB to chair for ambulation    CV: DNR, NSR, lopressor - -180    Pulm: DNI, attempt to ween to 28% venti, scopolamine for secretions continue pulm toilet, percussion, q2 suctioning    GI/Nutrition: switch TF to gevity    /Renal: nikia, start cordora, d/c nikia tomorrow w/TOV    ID: continue vanc, cefe, tobra d/c 10/9    Lines/Tubes: replace nikia polk, piv    Endo: no issues    Skin: frequent movement to prevent skin breakdown    Proph: scd, lovenox 40 qd    Dispo: SICU, pt still at risk for respiratory distress due to continued inability to control secretions      CRITICAL CARE TIME SPENT: INTERVAL HPI/OVERNIGHT EVENTS/SUBJECTIVE:    ICU Vital Signs Last 24 Hrs  T(C): 37.6 (07 Oct 2019 04:00), Max: 38.2 (06 Oct 2019 12:15)  T(F): 99.7 (07 Oct 2019 04:00), Max: 100.8 (06 Oct 2019 12:15)  HR: 60 (07 Oct 2019 07:00) (52 - 70)  BP: 160/63 (07 Oct 2019 07:00) (114/57 - 219/154)  BP(mean): 91 (07 Oct 2019 07:00) (81 - 159)  ABP: --  ABP(mean): --  RR: 20 (07 Oct 2019 07:00) (18 - 44)  SpO2: 96% (07 Oct 2019 07:00) (93% - 100%)      I&O's Detail    06 Oct 2019 07:01  -  07 Oct 2019 07:00  --------------------------------------------------------  IN:    Enteral Tube Flush: 350 mL    multiple electrolytes Injection Type 1: 1008 mL    Pivot 1.5: 1380 mL    Solution: 50 mL    Solution: 250 mL    Solution: 250 mL    Solution: 150 mL  Total IN: 3438 mL    OUT:    Indwelling Catheter - Urethral: 2080 mL  Total OUT: 2080 mL    Total NET: 1358 mL                MEDICATIONS  (STANDING):  ALBUTerol    0.083% 2.5 milliGRAM(s) Nebulizer every 6 hours  bromocriptine Tablet 5 milliGRAM(s) Oral <User Schedule>  cefepime   IVPB 2000 milliGRAM(s) IV Intermittent every 8 hours  cefepime   IVPB      chlorhexidine 2% Cloths 1 Application(s) Topical daily  enoxaparin Injectable 40 milliGRAM(s) SubCutaneous daily  melatonin 3 milliGRAM(s) Oral at bedtime  modafinil 100 milliGRAM(s) Oral daily  multiple electrolytes Injection Type 1 1000 milliLiter(s) (42 mL/Hr) IV Continuous <Continuous>  pantoprazole  Injectable 40 milliGRAM(s) IV Push daily  primidone 50 milliGRAM(s) Oral two times a day  propranolol 20 milliGRAM(s) Oral every 6 hours  scopolamine   Patch 1 Patch Transdermal every 72 hours  scopolamine   Patch 1 Patch Transdermal every 72 hours  sodium phosphate IVPB 15 milliMole(s) IV Intermittent once  tobramycin for Nebulization 300 milliGRAM(s) Inhalation every 12 hours  vancomycin  IVPB 1250 milliGRAM(s) IV Intermittent <User Schedule>    MEDICATIONS  (PRN):  acetaminophen    Suspension .. 650 milliGRAM(s) Oral every 6 hours PRN Temp greater or equal to 38C (100.4F)      NUTRITION/IVF:     CENTRAL LINE:  LOCATION:   DATE INSERTED:  CVP:  SCVO2:    MONTEJO:   YES DATE INSERTED: 10/4/2019    A-LINE:    LOCATION:   DATE INSERTED:   SVV:  CO/CI:     CHEST TUBE:  LOCATION:  DATE INSERTED: OUTPUT/24 HRS:  SUCTION/WATER SEAL:     NG/OG TUBE:  DATE INSERTED:  OUTPUT/24 HRS:    MISC:     PHYSICAL EXAM:    Gen: NAD, laying in bed, mouth closed, breathing through nose    Eyes: PERRLA, conjuctiva pink, anicteric, left eye more difficult to exam due to pt refusing to open    Neurological: not following commands, reacts to stimulus, does not move LE B/L, babinski absent    ENMT: mmm, two staples overlying well healing surgical scar right scalp, nasal trumpet in place, dobhoff in place, on venti mask    Neck: supple, trachea midline     Pulmonary: rhonchi in upper fields B/L, improving, no increased WOB, no accessory muscle use    Cardiovascular: NSR, S1/S2 present, no mrg, cap refill <2sec    Gastrointestinal: +bs, abdomen soft, mildly distended, obese    Genitourinary: montejo in place, urine light in color, no gross hematuria    Extremities: no edema, no cyanosis    Skin: warm, dry, intact          LABS:  CBC Full  -  ( 07 Oct 2019 04:53 )  WBC Count : 12.12 K/uL  RBC Count : 3.62 M/uL  Hemoglobin : 11.3 g/dL  Hematocrit : 35.2 %  Platelet Count - Automated : 203 K/uL  Mean Cell Volume : 97.2 fl  Mean Cell Hemoglobin : 31.2 pg  Mean Cell Hemoglobin Concentration : 32.1 gm/dL  Auto Neutrophil # : x  Auto Lymphocyte # : x  Auto Monocyte # : x  Auto Eosinophil # : x  Auto Basophil # : x  Auto Neutrophil % : x  Auto Lymphocyte % : x  Auto Monocyte % : x  Auto Eosinophil % : x  Auto Basophil % : x    10-07    134<L>  |  96<L>  |  27.0<H>  ----------------------------<  161<H>  4.5   |  27.0  |  0.75    Ca    8.2<L>      07 Oct 2019 04:53  Phos  2.7     10-07  Mg     2.3     10-07          RECENT CULTURES:  10-02 .Sputum Enterobacter cloacae   Moderate White blood cells  Few Gram Negative Rods  Few Gram Positive Cocci in Pairs and Chains  Few Gram Positive Rods   Moderate Enterobacter cloacae  Numerous Routine respiratory maine present    10-01 .CSF XXXX   Few White blood cells  No organisms seen   No growth at 3 days.    10-01 .Blood XXXX XXXX   No growth at 5 days.              CAPILLARY BLOOD GLUCOSE      RADIOLOGY & ADDITIONAL STUDIES:    ASSESSMENT/PLAN:  84yMale presenting with:    Neuro: CT Head, pt not participating in exam as before, continue q4 neuro checks, OOB to chair for ambulation    CV: DNR, NSR, lopressor - -180    Pulm: DNI, attempt to ween to 28% venti, scopolamine for secretions continue pulm toilet, percussion, q2 suctioning    GI/Nutrition: switch TF to gevity    /Renal: nikia, start cardura, d/c nikia in 48 hrs w/TOV    ID: continue vanc, cefe, tobra d/c 10/9    Lines/Tubes: replace nikia polk, piv    Endo: no issues    Skin: frequent movement to prevent skin breakdown    Proph: scd, lovenox 40 qd    Dispo: SICU, pt still at risk for respiratory distress due to continued inability to control secretions      CRITICAL CARE TIME SPENT:

## 2019-10-07 NOTE — PROGRESS NOTE ADULT - SUBJECTIVE AND OBJECTIVE BOX
[facility]                                        [facility]                                 Frieda Saunders, & Jose                                  [address]                                        [place], [place], [address]                                             [phone]        CC: intracranial hemorrhage     HPI:   The patient is a 84y Male who presented as a transfer to [facility] for management of ICH.  He apparently may have had seizure and right sided weakness and was brought to [facility].  The patient was transferred to [facility] for intracranial hemorrhage management and neurosurgical eval.  He was intubated for transfer.  An extraventricular drain was placed. This was ultimately removed.    Interim history:  The patient remains in the SICU.     ROS:   Unobtainable due to patient's condition.     MEDICATIONS  (STANDING):  ALBUTerol    0.083% 2.5 milliGRAM(s) Nebulizer every 6 hours  bromocriptine Tablet 5 milliGRAM(s) Oral <User Schedule>  cefepime   IVPB 2000 milliGRAM(s) IV Intermittent every 8 hours  cefepime   IVPB      chlorhexidine 2% Cloths 1 Application(s) Topical daily  doxazosin 1 milliGRAM(s) Oral at bedtime  enoxaparin Injectable 40 milliGRAM(s) SubCutaneous daily  melatonin 3 milliGRAM(s) Oral at bedtime  modafinil 100 milliGRAM(s) Oral daily  multiple electrolytes Injection Type 1 1000 milliLiter(s) (42 mL/Hr) IV Continuous <Continuous>  pantoprazole  Injectable 40 milliGRAM(s) IV Push daily  primidone 50 milliGRAM(s) Oral two times a day  propranolol 20 milliGRAM(s) Oral every 6 hours  scopolamine   Patch 1 Patch Transdermal every 72 hours  scopolamine   Patch 1 Patch Transdermal every 72 hours  tobramycin for Nebulization 300 milliGRAM(s) Inhalation every 12 hours  vancomycin  IVPB 1250 milliGRAM(s) IV Intermittent <User Schedule>      Vital Signs Last 24 Hrs  T(C): 38.3 (07 Oct 2019 09:00), Max: 38.3 (07 Oct 2019 09:00)  T(F): 101 (07 Oct 2019 09:00), Max: 101 (07 Oct 2019 09:00)  HR: 64 (07 Oct 2019 10:00) (52 - 70)  BP: 112/56 (07 Oct 2019 10:00) (112/56 - 219/154)  BP(mean): 81 (07 Oct 2019 10:00) (81 - 159)  RR: 19 (07 Oct 2019 10:00) (18 - 39)  SpO2: 95% (07 Oct 2019 10:00) (93% - 100%)    Detailed Neurologic Exam:    Mental status: The patient is sleepy but arouses to voice. He follows only very simple instructions.     Cranial nerves: Pupils equal and react symmetrically to light. Extraocular motion is difficult to assess as he does not open eyes/keep eyes open. Facial musculature is asymmetric with central right weakness. Palate and tongue are difficult to evaluate.     Motor/sensory: There is normal bulk and tone.  There is no tremor.  Moving right minimally (3/5) to stimuli.  Moving left 5/5 to stimuli.     Reflexes: diminished throughout and plantar responses are flexor left, extensor right.    Cerebellar: Cannot be assessed.   Labs:     10-07    134<L>  |  96<L>  |  27.0<H>  ----------------------------<  161<H>  4.5   |  27.0  |  0.75    Ca    8.2<L>      07 Oct 2019 04:53  Phos  2.7     10-07  Mg     2.3     10-07                              11.3   12.12 )-----------( 203      ( 07 Oct 2019 04:53 )             35.2

## 2019-10-07 NOTE — PROGRESS NOTE ADULT - ASSESSMENT
Assessment:  85 yo M with h/o HTN, presents with L[facility] ICH/IVH, possible seizures. Etiology - like HTN. CTA w/o signs of vascular pathology.  S/p R EVD removal 10/4. Radiographically stable on repeat CTh with clearing IVH. Worse clinically today.  Acute hypoxic resp failure, requires O2 supplementation,  Aspiration PNA, on ABx. CXR with worsening interstitial makings.    Plan:  -please, continue neurochecks    -pending CTh as per primary team - r/o hydro, rebleeding, swelling   -CSF Cx - negative;on Cefepime 2 q8hr and Vanco for HCAP  -please, maintain normotension  -would recommend Osat >92%  -monitor Na, avoid hypoNa  -will follow  -ongoing GOC discussion with the family -  made DNR/DNI for now    Patient is critically ill and at high risk of death or neurologic complications due to re-bleeding, brain swelling/ compression/ herniation, cardiorespiratory failure.

## 2019-10-07 NOTE — CHART NOTE - NSCHARTNOTEFT_GEN_A_CORE
Discussed with family at bedside the plan to switch from NGT to PEG for feeding.  Explained to the family that it would be unlikely to result in a different overall outcome in his condition, but it would be necessary to transition him from the hospital.  Son and wife were present, and expressed concerned that if this were to be a permanent change they would not want to proceed.

## 2019-10-07 NOTE — PROCEDURE NOTE - ADDITIONAL PROCEDURE DETAILS
change dressing in 3 days, sooner if soiled/stained.  ok to open to air in 3 days, 10/7
one staple remained over the EVD exit site/ EVD removed on 10/4

## 2019-10-07 NOTE — PROGRESS NOTE ADULT - SUBJECTIVE AND OBJECTIVE BOX
Patient seen at bedside. Patient less responsive today compared to last week  Unable to follow any commands today. Does not open eyes, unable to show 2 fingers when asked.  Patient does withdraw to pain in LLE, limited in RLE, +grimace to pain     REVIEW OF SYSTEMS - limited due to cognitive status  Constitutional - +fever,  +fatigue  Neurological - + loss of strength    CURRENT FUNCTIONAL STATUS  10/4 PT  Bed Mobility  Bed Mobility Training Rehab Potential: good, to achieve stated therapy goals  Bed Mobility Training Symptoms Noted During/After Treatment: none  Bed Mobility Training Rolling/Turning: moderate assist (50% patient effort);  1 person assist;  nonverbal cues (demo/gestures);  verbal cues  Bed Mobility Training Scooting: dependent (less than 25% patient effort);  2 person assist;  verbal cues;  nonverbal cues (demo/gestures)  Bed Mobility Training Sit-to-Supine: maximum assist (25% patient effort);  moderate assist (50% patient effort);  2 person assist;  nonverbal cues (demo/gestures);  verbal cues  Bed Mobility Training Supine-to-Sit: moderate assist (50% patient effort);  maximum assist (25% patient effort);  2 person assist;  nonverbal cues (demo/gestures);  verbal cues;  head of the bed elevated   Bed Mobility Training Limitations: decreased ability to use arms for pushing/pulling;  decreased ability to use legs for bridging/pushing;  impaired ability to control trunk for mobility;  posteriorlean, excessive weight shift to left in frontal plane;  decreased flexibility;  decreased strength;  decreased ROM;  impaired balance;  cognitive, decreased safety awareness;  impaired postural control    Sit-Stand Transfer Training  Sit-to-Stand Transfer Training Rehab Potential: fair, will monitor progress closely  Sit-to-Stand Transfer Training Symptoms Noted During/After Treatment: fatigue  Transfer Training Sit-to-Stand Transfer: maximum assist (25% patient effort);  2 person assist;  nonverbal cues (demo/gestures);  verbal cues;  full weight-bearing   hand held assist, unable to complete full upright   Transfer Training Stand-to-Sit Transfer: maximum assist (25% patient effort);  2 person assist;  verbal cues;  nonverbal cues (demo/gestures);  full weight-bearing   hand held assist   Sit-to-Stand Transfer Training Transfer Safety Analysis: decreased balance;  decreased cognition;  decreased flexibility;  decreased strength;  impaired balance;  cognitive, decreased safety awareness;  Vitaly knees in flexion unable to increased hip extension    Functional Endurance  Functional Endurance Rehab Effort: good  Functional Endurance Symptoms Noted During/After Treatment: fatigue  Functional Endurance Detail: sitting at the edge of the bed in closed kinetic chain greater than 5 min     Neuromuscular Re-education  Neuromuscular Re-education Rehab Effort: good  Neuromuscular Re-education Symptoms Noted During/After Treatment: fatigue  Neuromuscular Re-education Detail: in sitting closed kinetic chain midline orientation with tactile and verbal cues max A-min A     OT 9/30    Bathing Training:     · Level of Dodson	dependent (less than 25% patients effort)	  · Physical Assist/Nonphysical Assist	1 person assist	    Upper Body Dressing Training:     · Level of Dodson	dependent (less than 25% patients effort); to don gown due to decreased cognition and lethargy	    Lower Body Dressing Training:     · Level of Dodson	dependent (less than 25% patients effort); to don socks secondary to decreased cognition	    Toilet Hygiene Training:     · Level of Dodson	dependent (less than 25% patients effort)	    Grooming Training:     · Level of Dodson	dependent (less than 25% patients effort); secondary to decreased cognition	    Eating/Self-Feeding Training:     · Level of Dodson	to be assessed	        RECENT LABS/IMAGING  CBC Full  -  ( 07 Oct 2019 04:53 )  WBC Count : 12.12 K/uL  RBC Count : 3.62 M/uL  Hemoglobin : 11.3 g/dL  Hematocrit : 35.2 %  Platelet Count - Automated : 203 K/uL  Mean Cell Volume : 97.2 fl  Mean Cell Hemoglobin : 31.2 pg  Mean Cell Hemoglobin Concentration : 32.1 gm/dL  Auto Neutrophil # : x  Auto Lymphocyte # : x  Auto Monocyte # : x  Auto Eosinophil # : x  Auto Basophil # : x  Auto Neutrophil % : x  Auto Lymphocyte % : x  Auto Monocyte % : x  Auto Eosinophil % : x  Auto Basophil % : x    10-07    134<L>  |  96<L>  |  27.0<H>  ----------------------------<  161<H>  4.5   |  27.0  |  0.75    Ca    8.2<L>      07 Oct 2019 04:53  Phos  2.7     10-07  Mg     2.3     10-07    IMAGING  < from: CT Head No Cont (10.05.19 @ 11:34) >  Stable left basal ganglia hemorrhage. Stable  moderate intraventricular hemorrhage. Stable ventricular size. No new   hemorrhage. No hydrocephalus.   Small pneumocephalus following removal of ventriculostomy catheter   through RIGHT frontal kane hole.    VITALS  T(C): 38 (10-07-19 @ 14:00), Max: 38.3 (10-07-19 @ 09:00)  HR: 59 (10-07-19 @ 14:00) (52 - 70)  BP: 148/66 (10-07-19 @ 14:00) (106/54 - 188/71)  RR: 21 (10-07-19 @ 14:00) (18 - 39)  SpO2: 93% (10-07-19 @ 14:00) (93% - 100%)  Wt(kg): --    ALLERGIES  No Known Allergies      MEDICATIONS   acetaminophen    Suspension .. 650 milliGRAM(s) Oral every 6 hours PRN  ALBUTerol    0.083% 2.5 milliGRAM(s) Nebulizer every 6 hours  bromocriptine Tablet 5 milliGRAM(s) Oral <User Schedule>  cefepime   IVPB 2000 milliGRAM(s) IV Intermittent every 8 hours  cefepime   IVPB      chlorhexidine 2% Cloths 1 Application(s) Topical daily  doxazosin 1 milliGRAM(s) Oral at bedtime  enoxaparin Injectable 40 milliGRAM(s) SubCutaneous daily  melatonin 3 milliGRAM(s) Oral at bedtime  modafinil 100 milliGRAM(s) Oral daily  multiple electrolytes Injection Type 1 1000 milliLiter(s) IV Continuous <Continuous>  pantoprazole  Injectable 40 milliGRAM(s) IV Push daily  primidone 50 milliGRAM(s) Oral two times a day  propranolol 20 milliGRAM(s) Oral every 6 hours  scopolamine   Patch 1 Patch Transdermal every 72 hours  scopolamine   Patch 1 Patch Transdermal every 72 hours  tobramycin for Nebulization 300 milliGRAM(s) Inhalation every 12 hours  vancomycin  IVPB 1250 milliGRAM(s) IV Intermittent <User Schedule>  ----------------------------------------------------------------------------------------  PHYSICAL EXAM  Constitutional - NAD, does not open eyes to name call, poor command following today  Patient withdrawing to pain in LLE, limited in RLE, + grimace to pain  HEENT - does not open eyes,   Chest - remains with O2 mask for oxygen support  Abdomen - BS+, Soft, ND  Extremities - + edema in b/l LE  Neurologic Exam -                    Cognitive -poor command following today compared to prior exam     Communication - nonverbal at this time     Sensory - unreliable at this time    ----------------------------------------------------------------------------------------  ASSESSMENT/PLAN    85 yo Male with functional deficits after an acute left basal ganglia hemorrhage    Left BG hemorrhage - management as per ICU team  HAP/Fevers - Cefepime, Vanco, Albuterol, O2 mask for oxygen demand, scopolamine for secretions  Arousal - Bromocriptine 5mg BID 6AM/12PM (10/4), Amantadine 100mg BID (DC 10/4), Provigil 100mg daily (10/4)  Sleep - Melatonin 3mg (10/1)  HTN - Propranolol  Urinary Retention - Cardura  Tremor? - Primidone  Pain - Tylenol  Diet - NPO, +NGT   DVT PPX - SCDs, Lovenox  Rehab - Patient medically/surgically being optimized and in guarded state. Patient continues to have intermittent fevers and is currently being treated for HAP. Patient is now DNR/DNI.    Continue mobilization by staff to maintain cardiopulmonary function and prevention of secondary complications related to debility. Will continue to follow for ongoing rehab needs and recommendations. . Functional progress will determine ongoing rehab dispo recommendations, which may change.

## 2019-10-07 NOTE — PROGRESS NOTE ADULT - SUBJECTIVE AND OBJECTIVE BOX
CC:  follow up GOC  INTERVAL HPI/OVERNIGHT EVENTS:  reported MS poor this am - repeat CT ordered  issues of secretions  noted SICU RODRIGUEZ Meneses note- possible PEG  PRESENT SYMPTOMS: SOURCE:  Patient/Family/Team    PAIN SCALE:  0 = none  1 = mild   2 = moderate  3 = severe    Pain: appears comfortable    Dyspnea:  [ x] YES [ ] NO  Anxiety:  [ ] YES [x ] NO  Fatigue: [x ] YES [ ] NO  Nausea: [ ] YES [ x] NO  Loss of Appetite: [ ] YES [ ] NO  na on TF  Other symptoms: __________    MEDICATIONS  (STANDING):  ALBUTerol    0.083% 2.5 milliGRAM(s) Nebulizer every 6 hours  bromocriptine Tablet 5 milliGRAM(s) Oral <User Schedule>  cefepime   IVPB 2000 milliGRAM(s) IV Intermittent every 8 hours  cefepime   IVPB      chlorhexidine 2% Cloths 1 Application(s) Topical daily  doxazosin 1 milliGRAM(s) Oral at bedtime  enoxaparin Injectable 40 milliGRAM(s) SubCutaneous daily  melatonin 3 milliGRAM(s) Oral at bedtime  modafinil 100 milliGRAM(s) Oral daily  multiple electrolytes Injection Type 1 1000 milliLiter(s) (42 mL/Hr) IV Continuous <Continuous>  pantoprazole  Injectable 40 milliGRAM(s) IV Push daily  primidone 50 milliGRAM(s) Oral two times a day  propranolol 20 milliGRAM(s) Oral every 6 hours  scopolamine   Patch 1 Patch Transdermal every 72 hours  scopolamine   Patch 1 Patch Transdermal every 72 hours  tobramycin for Nebulization 300 milliGRAM(s) Inhalation every 12 hours  vancomycin  IVPB 1250 milliGRAM(s) IV Intermittent <User Schedule>    MEDICATIONS  (PRN):  acetaminophen    Suspension .. 650 milliGRAM(s) Oral every 6 hours PRN Temp greater or equal to 38C (100.4F)      Allergies    No Known Allergies    Intolerances      Karnofsky Performance Score/Palliative Performance Status Version 2:   20  %    Vital Signs Last 24 Hrs  T(C): 38 (07 Oct 2019 16:00), Max: 38.3 (07 Oct 2019 09:00)  T(F): 100.4 (07 Oct 2019 16:00), Max: 101 (07 Oct 2019 09:00)  HR: 66 (07 Oct 2019 16:00) (52 - 70)  BP: 141/65 (07 Oct 2019 16:00) (106/54 - 188/71)  BP(mean): 93 (07 Oct 2019 16:00) (75 - 135)  RR: 21 (07 Oct 2019 16:00) (18 - 39)  SpO2: 94% (07 Oct 2019 16:00) (93% - 100%)    PHYSICAL EXAM:    General: Lethargic NAD    HEENT: normal    Lungs: [x ] comfortable [ ] tachypnea/labored breathing  [ ] excessive secretions    CV: [ x] normal  [ ] tachycardia    GI: [ x] normal  [ ] distended  [ ] tender  [ ] no BS               [x ] NG    : [xormal  [ ]x incontinent  [ ] oliguria/anuria  [ ] montejo    MSK: [ ] normal  [x ] weakness  [ ] edema             [ ] ambulatory  [x ] bedbound/wheelchair bound    Skin: [ ] normal  [ ] pressure ulcers- Stage_____  [ x] no rash    LABS:                        11.3   12.12 )-----------( 203      ( 07 Oct 2019 04:53 )             35.2     10-07    134<L>  |  96<L>  |  27.0<H>  ----------------------------<  161<H>  4.5   |  27.0  |  0.75    Ca    8.2<L>      07 Oct 2019 04:53  Phos  2.7     10-07  Mg     2.3     10-07          I&O's Summary    06 Oct 2019 07:01  -  07 Oct 2019 07:00  --------------------------------------------------------  IN: 3438 mL / OUT: 2080 mL / NET: 1358 mL    07 Oct 2019 07:01  -  07 Oct 2019 16:08  --------------------------------------------------------  IN: 1054 mL / OUT: 1155 mL / NET: -101 mL        RADIOLOGY & ADDITIONAL STUDIES:  < from: CT Head No Cont (10.05.19 @ 11:34) >   EXAM:  CT BRAIN                          PROCEDURE DATE:  10/05/2019          INTERPRETATION:  Head CT without contrast   COMPARISON: 10/4/2019 brain CT 9:02 AM.  CLINICAL INFORMATION: A ventriculostomy catheter removed. Status post   hemorrhagic stroke. Follow-up..  TECHNIQUE: Contiguous axial 2.5 mm slice thickness images of the head   were obtained without the use of intravenous contrast media.  This scan was performed using automatic exposure control (radiation dose   reduction software) to obtain a diagnostic image quality scan with   patient dose as low as reasonably achievable.     FINDINGS:  RIGHT ventriculostomy catheter has been removed. Small area of a   pneumocephalus overlies the RIGHT frontal lobe that site of removal of   catheter. Stable ventricular size. Improving left basal ganglia   hemorrhage with   dissection into the lateral ventricles. Stable moderate intraventricular   hemorrhage. No hydrocephalus. No new hemorrhage. Stable mild edema in   left   basal ganglia. Tiny scattered areas of bilateral subarachnoid hemorrhage,   unchanged.     Chronic small right basal ganglia lacunar. 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    IMPRESSION: Stable left basal ganglia hemorrhage. Stable  moderate intraventricular hemorrhage. Stable ventricular size. No new   hemorrhage. No hydrocephalus.   Small pneumocephalus following removal of ventriculostomy catheter   through RIGHT frontal kane hole.          < end of copied text >      ADVANCE DIRECTIVES:  [ x] YES [ ] NO    DNR: [x ] YES [ ] NO      Date Completed:                    TAYLOR [x ] YES [ ] NO   Date Completed: 10/4/19      Thank you for the opportunity to assist with the care of this patient.   [facility] [phone].

## 2019-10-07 NOTE — PROCEDURE NOTE - NSPOSTCAREGUIDE_GEN_A_CORE
Care for catheter as per unit/ICU protocols
burrhole incision and drain exit site covered w mepilex AG dressing, RN aware
daily wound care/ prep
Verbal/written post procedure instructions were given to patient/caregiver

## 2019-10-07 NOTE — PROGRESS NOTE ADULT - SUBJECTIVE AND OBJECTIVE BOX
83 yo M with PMH of HTN, presents as transfer from [facility] with L BG ICH with IVH.   Last seen normal was 10 pm last night, wife found him confused, agitated. Possible seizure.  Received Keppra and mannitol.  Of note, takes ASA 81 daily, no other AC/antiplts.  On admission, the patient was:  GCS: 11t ([id number]).  ICH score: 3  EVD placed on admission, at 10.    Noted to be less responsive and alert this AM.    LABS: reviewed.  IMAGING: Recent imaging studies were reviewed.  MEDICATIONS: reviewed.    EXAMINATION:   General:  lying in bed, cooperative.  HEENT:  no EO, resisting EO.  Neuro: less alert today alert, cannot assess orientation due to medical condition, follows some commands, BL UE at least 3/5, HG 3/5; LLE wiggled toes, attempts to bend knee, minimal movement RLE.  Cards:  S1S2 present.  Respiratory:  pathologic pattern of breathing, gurgling sounds noted again.   Abdomen:  soft  Skin:  warm/dry

## 2019-10-07 NOTE — PROGRESS NOTE ADULT - PROBLEM SELECTOR PLAN 5
Discussed with SICU and NICU.    Patient for repeat CT head today. Secretions causing issues with respiratory status.  PEG?  Wife reported does not want it to be permanent.   Wife not present at bedside.   Plan for further GOC discussion with family

## 2019-10-08 LAB
ANION GAP SERPL CALC-SCNC: 9 MMOL/L — SIGNIFICANT CHANGE UP (ref 5–17)
BASOPHILS # BLD AUTO: 0.08 K/UL — SIGNIFICANT CHANGE UP (ref 0–0.2)
BASOPHILS NFR BLD AUTO: 0.7 % — SIGNIFICANT CHANGE UP (ref 0–2)
BUN SERPL-MCNC: 24 MG/DL — HIGH (ref 8–20)
CALCIUM SERPL-MCNC: 8.3 MG/DL — LOW (ref 8.6–10.2)
CHLORIDE SERPL-SCNC: 96 MMOL/L — LOW (ref 98–107)
CO2 SERPL-SCNC: 29 MMOL/L — SIGNIFICANT CHANGE UP (ref 22–29)
CREAT SERPL-MCNC: 0.69 MG/DL — SIGNIFICANT CHANGE UP (ref 0.5–1.3)
EOSINOPHIL # BLD AUTO: 0.23 K/UL — SIGNIFICANT CHANGE UP (ref 0–0.5)
EOSINOPHIL NFR BLD AUTO: 2.1 % — SIGNIFICANT CHANGE UP (ref 0–6)
GLUCOSE BLDC GLUCOMTR-MCNC: 111 MG/DL — HIGH (ref 70–99)
GLUCOSE SERPL-MCNC: 115 MG/DL — SIGNIFICANT CHANGE UP (ref 70–115)
HCT VFR BLD CALC: 34.6 % — LOW (ref 39–50)
HGB BLD-MCNC: 11.2 G/DL — LOW (ref 13–17)
IMM GRANULOCYTES NFR BLD AUTO: 2.2 % — HIGH (ref 0–1.5)
LYMPHOCYTES # BLD AUTO: 1.57 K/UL — SIGNIFICANT CHANGE UP (ref 1–3.3)
LYMPHOCYTES # BLD AUTO: 14.5 % — SIGNIFICANT CHANGE UP (ref 13–44)
MAGNESIUM SERPL-MCNC: 2.2 MG/DL — SIGNIFICANT CHANGE UP (ref 1.6–2.6)
MCHC RBC-ENTMCNC: 30.9 PG — SIGNIFICANT CHANGE UP (ref 27–34)
MCHC RBC-ENTMCNC: 32.4 GM/DL — SIGNIFICANT CHANGE UP (ref 32–36)
MCV RBC AUTO: 95.3 FL — SIGNIFICANT CHANGE UP (ref 80–100)
MONOCYTES # BLD AUTO: 1.03 K/UL — HIGH (ref 0–0.9)
MONOCYTES NFR BLD AUTO: 9.5 % — SIGNIFICANT CHANGE UP (ref 2–14)
NEUTROPHILS # BLD AUTO: 7.71 K/UL — HIGH (ref 1.8–7.4)
NEUTROPHILS NFR BLD AUTO: 71 % — SIGNIFICANT CHANGE UP (ref 43–77)
PHOSPHATE SERPL-MCNC: 2.8 MG/DL — SIGNIFICANT CHANGE UP (ref 2.4–4.7)
PLATELET # BLD AUTO: 246 K/UL — SIGNIFICANT CHANGE UP (ref 150–400)
POTASSIUM SERPL-MCNC: 4.8 MMOL/L — SIGNIFICANT CHANGE UP (ref 3.5–5.3)
POTASSIUM SERPL-SCNC: 4.8 MMOL/L — SIGNIFICANT CHANGE UP (ref 3.5–5.3)
RBC # BLD: 3.63 M/UL — LOW (ref 4.2–5.8)
RBC # FLD: 13.8 % — SIGNIFICANT CHANGE UP (ref 10.3–14.5)
SODIUM SERPL-SCNC: 134 MMOL/L — LOW (ref 135–145)
WBC # BLD: 10.86 K/UL — HIGH (ref 3.8–10.5)
WBC # FLD AUTO: 10.86 K/UL — HIGH (ref 3.8–10.5)

## 2019-10-08 PROCEDURE — 99497 ADVNCD CARE PLAN 30 MIN: CPT

## 2019-10-08 PROCEDURE — 99233 SBSQ HOSP IP/OBS HIGH 50: CPT

## 2019-10-08 PROCEDURE — 99231 SBSQ HOSP IP/OBS SF/LOW 25: CPT

## 2019-10-08 RX ORDER — LEVOTHYROXINE SODIUM 125 MCG
25 TABLET ORAL AT BEDTIME
Refills: 0 | Status: DISCONTINUED | OUTPATIENT
Start: 2019-10-08 | End: 2019-10-15

## 2019-10-08 RX ORDER — PANTOPRAZOLE SODIUM 20 MG/1
40 TABLET, DELAYED RELEASE ORAL DAILY
Refills: 0 | Status: DISCONTINUED | OUTPATIENT
Start: 2019-10-08 | End: 2019-10-09

## 2019-10-08 RX ADMIN — BROMOCRIPTINE MESYLATE 5 MILLIGRAM(S): 5 CAPSULE ORAL at 13:56

## 2019-10-08 RX ADMIN — ALBUTEROL 2.5 MILLIGRAM(S): 90 AEROSOL, METERED ORAL at 03:09

## 2019-10-08 RX ADMIN — Medication 650 MILLIGRAM(S): at 05:38

## 2019-10-08 RX ADMIN — ENOXAPARIN SODIUM 40 MILLIGRAM(S): 100 INJECTION SUBCUTANEOUS at 12:53

## 2019-10-08 RX ADMIN — Medication 300 MILLIGRAM(S): at 08:20

## 2019-10-08 RX ADMIN — MODAFINIL 100 MILLIGRAM(S): 200 TABLET ORAL at 12:53

## 2019-10-08 RX ADMIN — ALBUTEROL 2.5 MILLIGRAM(S): 90 AEROSOL, METERED ORAL at 14:28

## 2019-10-08 RX ADMIN — SCOPALAMINE 1 PATCH: 1 PATCH, EXTENDED RELEASE TRANSDERMAL at 19:34

## 2019-10-08 RX ADMIN — Medication 25 MICROGRAM(S): at 22:20

## 2019-10-08 RX ADMIN — PRIMIDONE 50 MILLIGRAM(S): 250 TABLET ORAL at 18:17

## 2019-10-08 RX ADMIN — CEFEPIME 100 MILLIGRAM(S): 1 INJECTION, POWDER, FOR SOLUTION INTRAMUSCULAR; INTRAVENOUS at 05:36

## 2019-10-08 RX ADMIN — CHLORHEXIDINE GLUCONATE 1 APPLICATION(S): 213 SOLUTION TOPICAL at 11:58

## 2019-10-08 RX ADMIN — Medication 166.67 MILLIGRAM(S): at 00:13

## 2019-10-08 RX ADMIN — Medication 3 MILLIGRAM(S): at 21:45

## 2019-10-08 RX ADMIN — SCOPALAMINE 1 PATCH: 1 PATCH, EXTENDED RELEASE TRANSDERMAL at 20:21

## 2019-10-08 RX ADMIN — CEFEPIME 100 MILLIGRAM(S): 1 INJECTION, POWDER, FOR SOLUTION INTRAMUSCULAR; INTRAVENOUS at 21:44

## 2019-10-08 RX ADMIN — Medication 650 MILLIGRAM(S): at 06:37

## 2019-10-08 RX ADMIN — BROMOCRIPTINE MESYLATE 5 MILLIGRAM(S): 5 CAPSULE ORAL at 06:18

## 2019-10-08 RX ADMIN — Medication 166.67 MILLIGRAM(S): at 13:56

## 2019-10-08 RX ADMIN — SCOPALAMINE 1 PATCH: 1 PATCH, EXTENDED RELEASE TRANSDERMAL at 19:33

## 2019-10-08 RX ADMIN — Medication 1 MILLIGRAM(S): at 21:45

## 2019-10-08 RX ADMIN — PANTOPRAZOLE SODIUM 40 MILLIGRAM(S): 20 TABLET, DELAYED RELEASE ORAL at 12:53

## 2019-10-08 RX ADMIN — ALBUTEROL 2.5 MILLIGRAM(S): 90 AEROSOL, METERED ORAL at 19:18

## 2019-10-08 RX ADMIN — PRIMIDONE 50 MILLIGRAM(S): 250 TABLET ORAL at 05:38

## 2019-10-08 RX ADMIN — CEFEPIME 100 MILLIGRAM(S): 1 INJECTION, POWDER, FOR SOLUTION INTRAMUSCULAR; INTRAVENOUS at 13:57

## 2019-10-08 RX ADMIN — ALBUTEROL 2.5 MILLIGRAM(S): 90 AEROSOL, METERED ORAL at 08:19

## 2019-10-08 RX ADMIN — Medication 300 MILLIGRAM(S): at 19:18

## 2019-10-08 NOTE — CHART NOTE - NSCHARTNOTEFT_GEN_A_CORE
CC: AMS       Interval events:  Patient seen and examined at bedside. No acute events overnight. Patient lying in bed comfortably, with son/ mother at bedside. Updated them in regards to transitioning the patient to the SDU and continuing to manage medical care. Also d/w them in regards to the patients quality of life, to respect the patients wishes if he has shared them with his wife and children. Son at bedside and stated the entire family is going to get together and make a decision in regards how to proceed further. Wife is upset and       Vital Signs Last 24 Hrs  T(C): 37.7 (08 Oct 2019 08:00), Max: 38.2 (07 Oct 2019 18:00)  T(F): 99.9 (08 Oct 2019 08:00), Max: 100.8 (07 Oct 2019 18:00)  HR: 64 (08 Oct 2019 15:00) (56 - 85)  BP: 152/75 (08 Oct 2019 15:00) (117/58 - 183/115)  BP(mean): 106 (08 Oct 2019 15:00) (83 - 122)  RR: 23 (08 Oct 2019 15:00) (17 - 32)  SpO2: 98% (08 Oct 2019 15:00) (92% - 99%)      CONSTITUTIONAL: Ill appearing, well nourished,  NAD  ENMT: NGT in place   EYES: Pt refusing to open eyes   CARDIAC: Normal rate, regular rhythm.  Heart sounds S1, S2.  No murmurs, rubs or gallops   RESPIRATORY: Breath sounds clear and equal bilaterally. No wheezes, rhales or rhonchi  GASTROENTEROLOGY: Soft nt nd bs +normoactive   EXTREMITIES: generalized anasarca, no cyanosis or deformity   NEUROLOGICAL: Non verbal, unable to assess bc pt is not following commands   SKIN: No rash, skin turgor poor    A/p: 83 y/o M with hx of HTN and hypothyroid [place] [facility] [place] [facility] [place] eval. Pt was intubated for transfer. 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 Upon arrival had emergent EVD placement which was done at the bedside in the ICU. ICH likely secondary to htn emergency, pt was placed on bp control. Also noted with acute respiratory failure with hypoxia, excess secretions and suspected HCAPNA empirically remained on vanco/ cefepime and inhaled tobramycin. Pt was able to be extubated on 10/1. Remains with excess secretions and with low grade fevers tmax: 100.8 unclear if related to current infection/ central fevers. ID consulted for further assessment. S/p R EVD removal 10/4. Radiographically stable on repeat CTh with clearing IVH. Neuro sx/ neuro continued to follow the pt. Pt continues to be unresponsive, minimally responding to commands, remains with NGT in place for feeding, unable to clear his own secretions at times. Continues on scopolamine and suctioning frequently. Overall guarded prognosis, palliative care team continues to follow the patient. Family has agreed to DNR/DNI but to continue with current care. Also decision was made to not proceed with peg placement. As per Son and wife at bedside, GOC conversations to take place with the entire family and decision will be made together. D/w them both in regards to patients quality of life, overall poor prognosis and poor neurological state. They will speak to the family to finalize decision making. D/w them to focus on the patients wishes, to honor his wishes and focus on his quality of life. Pt to be transferred to SDU, neurochecks q4hrs. Acute urinary retention to c/w cardura; montejo catheter to remain in place. Will d/c IVF to decrease risk of third spacing and start free water 250ml q8hrs via NGT. Hypothyroidism, pt was not started on synthroid and no noted tsh, d/w patients son and home dose was   Advanced directive: DNR/DNI - Guarded prognosis.   Updated wife and son at bedside in regards to the plan of care.     Advanced care discussion took 31 minutes. CC: AMS       Interval events:  Patient seen and examined at bedside. No acute events overnight. Patient lying in bed comfortably, with son/ mother at bedside. Updated them in regards to transitioning the patient to the SDU and continuing to manage medical care. Also d/w them in regards to the patients quality of life, to respect the patients wishes if he has shared them with his wife and children. Son at bedside and stated the entire family is going to get together and make a decision in regards how to proceed further. Wife is upset and       Vital Signs Last 24 Hrs  T(C): 37.7 (08 Oct 2019 08:00), Max: 38.2 (07 Oct 2019 18:00)  T(F): 99.9 (08 Oct 2019 08:00), Max: 100.8 (07 Oct 2019 18:00)  HR: 64 (08 Oct 2019 15:00) (56 - 85)  BP: 152/75 (08 Oct 2019 15:00) (117/58 - 183/115)  BP(mean): 106 (08 Oct 2019 15:00) (83 - 122)  RR: 23 (08 Oct 2019 15:00) (17 - 32)  SpO2: 98% (08 Oct 2019 15:00) (92% - 99%)      CONSTITUTIONAL: Ill appearing, well nourished,  NAD  ENMT: NGT in place   EYES: Pt refusing to open eyes   CARDIAC: Normal rate, regular rhythm.  Heart sounds S1, S2.  No murmurs, rubs or gallops   RESPIRATORY: Breath sounds clear and equal bilaterally. No wheezes, rhales or rhonchi  GASTROENTEROLOGY: Soft nt nd bs +normoactive   EXTREMITIES: generalized anasarca, no cyanosis or deformity   NEUROLOGICAL: Non verbal, unable to assess bc pt is not following commands   SKIN: No rash, skin turgor poor    A/p: 83 y/o M with hx of HTN and hypothyroid transferred to [facility] from [facility] [place] for [place] hemorrhage management and neurosurgical eval. Pt was intubated for transfer. 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 Upon arrival had emergent EVD placement which was done at the bedside in the ICU. ICH likely secondary to htn emergency, pt was placed on bp control. Also noted with acute respiratory failure with hypoxia, excess secretions and suspected HCAPNA empirically remained on vanco/ cefepime and inhaled tobramycin. Pt was able to be extubated on 10/1. Remains with excess secretions and with low grade fevers tmax: 100.8 unclear if related to current infection/ central fevers. ID consulted for further assessment. S/p R EVD removal 10/4. Radiographically stable on repeat CTh with clearing IVH. Neuro sx/ neuro continued to follow the pt. Pt continues to be unresponsive, minimally responding to commands, remains with NGT in place for feeding, unable to clear his own secretions at times. Continues on scopolamine and suctioning frequently. Overall guarded prognosis, palliative care team continues to follow the patient. Family has agreed to DNR/DNI but to continue with current care. Also decision was made to not proceed with peg placement. As per Son and wife at bedside, GOC conversations to take place with the entire family and decision will be made together. D/w them both in regards to patients quality of life, overall poor prognosis and poor neurological state. They will speak to the family to finalize decision making. D/w them to focus on the patients wishes, to honor his wishes and focus on his quality of life. Pt to be transferred to SDU, neurochecks q4hrs. Acute urinary retention to c/w cardura; montejo catheter to remain in place. Will d/c IVF to decrease risk of third spacing and start free water 250ml q8hrs via NGT. Hypothyroidism, pt was not started on synthroid and no noted tsh, will d/w patients family and restart synthroid. DVT ppx. PT as ordered.   Advanced directive: DNR/DNI - Guarded prognosis.   Updated wife and son at bedside in regards to the plan of care.     Advanced care discussion took 31 minutes. CC: AMS       Interval events:  Patient seen and examined at bedside. No acute events overnight. Patient lying in bed comfortably, with son/ mother at bedside. Updated them in regards to transitioning the patient to the SDU and continuing to manage medical care. Also d/w them in regards to the patients quality of life, to respect the patients wishes if he has shared them with his wife and children. Son at bedside and stated the entire family is going to get together and make a decision in regards how to proceed further. Wife is upset and       Vital Signs Last 24 Hrs  T(C): 37.7 (08 Oct 2019 08:00), Max: 38.2 (07 Oct 2019 18:00)  T(F): 99.9 (08 Oct 2019 08:00), Max: 100.8 (07 Oct 2019 18:00)  HR: 64 (08 Oct 2019 15:00) (56 - 85)  BP: 152/75 (08 Oct 2019 15:00) (117/58 - 183/115)  BP(mean): 106 (08 Oct 2019 15:00) (83 - 122)  RR: 23 (08 Oct 2019 15:00) (17 - 32)  SpO2: 98% (08 Oct 2019 15:00) (92% - 99%)      CONSTITUTIONAL: Ill appearing, well nourished,  NAD  ENMT: NGT in place   EYES: Pt refusing to open eyes   CARDIAC: Normal rate, regular rhythm.  Heart sounds S1, S2.  No murmurs, rubs or gallops   RESPIRATORY: Breath sounds clear and equal bilaterally. No wheezes, rhales or rhonchi  GASTROENTEROLOGY: Soft nt nd bs +normoactive   EXTREMITIES: generalized anasarca, no cyanosis or deformity   NEUROLOGICAL: Non verbal, unable to assess bc pt is not following commands   SKIN: No rash, skin turgor poor    A/p: 83 y/o M with hx of HTN and hypothyroid transferred to [facility] from [facility] [place] for [place] hemorrhage management and neurosurgical eval. Pt was intubated for transfer. 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 Upon arrival had emergent EVD placement which was done at the bedside in the ICU. ICH likely secondary to htn emergency, pt was placed on bp control. Also noted with acute respiratory failure with hypoxia, excess secretions and suspected HCAPNA empirically remained on vanco/ cefepime and inhaled tobramycin. Pt was able to be extubated on 10/1. Remains with excess secretions and with low grade fevers tmax: 100.8 unclear if related to current infection/ central fevers. ID consulted for further assessment. S/p R EVD removal 10/4. Radiographically stable on repeat CTh with clearing IVH. Neuro sx/ neuro continued to follow the pt. Pt continues to be unresponsive, minimally responding to commands, remains with NGT in place for feeding, unable to clear his own secretions at times. Continues on scopolamine and suctioning frequently. Overall guarded prognosis, palliative care team continues to follow the patient. Family has agreed to DNR/DNI but to continue with current care. Also decision was made to not proceed with peg placement. As per Son and wife at bedside, GOC conversations to take place with the entire family and decision will be made together. D/w them both in regards to patients quality of life, overall poor prognosis and poor neurological state. They will speak to the family to finalize decision making. D/w them to focus on the patients wishes, to honor his wishes and focus on his quality of life. Pt to be transferred to SDU, neurochecks q4hrs. Seizure c/w primidone. Acute urinary retention to c/w cardura; montejo catheter to remain in place. Will d/c IVF to decrease risk of third spacing and start free water 250ml q8hrs via NGT. Hypothyroidism, pt was not started on synthroid and no noted tsh, will d/w patients family and restart synthroid. DVT ppx. PT as ordered.   Advanced directive: DNR/DNI - Guarded prognosis.   Updated wife and son at bedside in regards to the plan of care.     Advanced care discussion took 31 minutes. CC: AMS       Interval events:  Patient seen and examined at bedside. No acute events overnight. Patient lying in bed comfortably, with son/ mother at bedside. Updated them in regards to transitioning the patient to the SDU and continuing to manage medical care. Also d/w them in regards to the patients quality of life, to respect the patients wishes if he has shared them with his wife and children. Son at bedside and stated the entire family is going to get together and make a decision in regards how to proceed further. Wife is upset and continues to find the entire situation difficult. Is also having difficulty with him being moved but understands the transition is needed. They both confirmed DNR/DNI status. Unable to obtain ROS from the patient bc of his current state.   Advanced care discussion took 32 minutes       Vital Signs Last 24 Hrs  T(C): 37.7 (08 Oct 2019 08:00), Max: 38.2 (07 Oct 2019 18:00)  T(F): 99.9 (08 Oct 2019 08:00), Max: 100.8 (07 Oct 2019 18:00)  HR: 64 (08 Oct 2019 15:00) (56 - 85)  BP: 152/75 (08 Oct 2019 15:00) (117/58 - 183/115)  BP(mean): 106 (08 Oct 2019 15:00) (83 - 122)  RR: 23 (08 Oct 2019 15:00) (17 - 32)  SpO2: 98% (08 Oct 2019 15:00) (92% - 99%)      CONSTITUTIONAL: Ill appearing, well nourished,  NAD  ENMT: NGT in place   EYES: Pt refusing to open eyes   CARDIAC: Normal rate, regular rhythm.  Heart sounds S1, S2.  No murmurs, rubs or gallops   RESPIRATORY: Breath sounds clear and equal bilaterally. No wheezes, rhales or rhonchi  GASTROENTEROLOGY: Soft nt nd bs +normoactive   EXTREMITIES: generalized anasarca, no cyanosis or deformity   NEUROLOGICAL: Non verbal, unable to assess bc pt is not following commands   SKIN: No rash, skin turgor poor    A/p: 85 y/o M with hx of HTN, BPH and hypothyroid [place] [facility] [place] [facility] [place] eval. Pt was intubated for transfer. [place] [facility] [place] left basal ganglia hemorrhage with intraventricular extension. Upon arrival had emergent EVD placement which was done at the bedside in the ICU. ICH likely secondary to htn emergency, pt was placed on bp control. Also noted with acute respiratory failure with hypoxia, excess secretions and suspected HCAPNA empirically remained on vanco/ cefepime and inhaled tobramycin. Pt was able to be extubated on 10/1. Remains with excess secretions and with low grade fevers tmax: 100.8 unclear if related to current infection/ central fevers. ID consulted for further assessment. S/p R EVD removal 10/4. Radiographically stable on repeat CTh with clearing IVH. Neuro sx/ neuro continued to follow the pt. Pt continues to be unresponsive, minimally responding to commands, remains with NGT in place for feeding, unable to clear his own secretions at times. Continues on scopolamine and suctioning frequently. Overall guarded prognosis, palliative care team continues to follow the patient. Family has agreed to DNR/DNI but to continue with current care. Also decision was made to not proceed with peg placement. As per Son and wife at bedside, GOC conversations to take place with the entire family and decision will be made together. D/w them both in regards to patients quality of life, overall poor prognosis and poor neurological state. They will speak to the family to finalize decision making. D/w them to focus on the patients wishes, to honor his wishes and focus on his quality of life. Pt to be transferred to SDU, neurochecks q4hrs. Seizure c/w primidone. Acute urinary retention, hx BPH to c/w cardura; montejo catheter to remain in place. Will d/c IVF to decrease risk of third spacing and start free water 250ml q8hrs via NGT. Hypothyroidism, pt was not started on synthroid and no noted tsh, will d/w patients family and restart synthroid if no answer then will start low dose IV for now and adjust accordingly. DVT ppx. PT as ordered.   Advanced directive: DNR/DNI - Guarded prognosis.   Updated wife and son at bedside in regards to the plan of care.     Advanced care discussion took 31 minutes.

## 2019-10-08 NOTE — PROGRESS NOTE ADULT - SUBJECTIVE AND OBJECTIVE BOX
SICU Progress Note    Last 24hrs: No acute events; DHT accidentally cut in half by nursing staff & replaced  Family does not want PEG tube given the chance that it may be permanent  Remained on ventimask overnight; slept very well; tolerating tube feeds  HDS  Suctioned PRN - still unable to clear secretions but volume of sections lessened     ICU Vital Signs Last 24 Hrs  T(C): 38.2 (08 Oct 2019 04:00), Max: 38.3 (07 Oct 2019 09:00)  T(F): 100.8 (08 Oct 2019 04:00), Max: 101 (07 Oct 2019 09:00)  HR: 63 (08 Oct 2019 05:00) (57 - 85)  BP: 152/67 (08 Oct 2019 05:00) (106/54 - 183/115)  BP(mean): 97 (08 Oct 2019 05:00) (75 - 124)  RR: 18 (08 Oct 2019 05:00) (17 - 32)  SpO2: 96% (08 Oct 2019 05:00) (92% - 99%)      I&O's Detail    06 Oct 2019 07:01  -  07 Oct 2019 07:00  --------------------------------------------------------  IN:    Enteral Tube Flush: 350 mL    multiple electrolytes Injection Type 1: 1008 mL    Pivot 1.5: 1380 mL    Solution: 50 mL    Solution: 250 mL    Solution: 250 mL    Solution: 150 mL  Total IN: 3438 mL    OUT:    Indwelling Catheter - Urethral: 2080 mL  Total OUT: 2080 mL    Total NET: 1358 mL      07 Oct 2019 07:01  -  08 Oct 2019 05:56  --------------------------------------------------------  IN:    Enteral Tube Flush: 830 mL    multiple electrolytes Injection Type 1: 924 mL    Pivot 1.5: 1260 mL    Solution: 250 mL    Solution: 100 mL    Solution: 500 mL  Total IN: 3864 mL    OUT:    Indwelling Catheter - Urethral: 4340 mL  Total OUT: 4340 mL    Total NET: -476 mL    MEDICATIONS  (STANDING):  ALBUTerol    0.083% 2.5 milliGRAM(s) Nebulizer every 6 hours  bromocriptine Tablet 5 milliGRAM(s) Oral <User Schedule>  cefepime   IVPB 2000 milliGRAM(s) IV Intermittent every 8 hours  cefepime   IVPB      chlorhexidine 2% Cloths 1 Application(s) Topical daily  doxazosin 1 milliGRAM(s) Oral at bedtime  enoxaparin Injectable 40 milliGRAM(s) SubCutaneous daily  melatonin 3 milliGRAM(s) Oral at bedtime  modafinil 100 milliGRAM(s) Oral daily  multiple electrolytes Injection Type 1 1000 milliLiter(s) (42 mL/Hr) IV Continuous <Continuous>  pantoprazole  Injectable 40 milliGRAM(s) IV Push daily  primidone 50 milliGRAM(s) Oral two times a day  propranolol 20 milliGRAM(s) Oral every 6 hours  scopolamine   Patch 1 Patch Transdermal every 72 hours  scopolamine   Patch 1 Patch Transdermal every 72 hours  tobramycin for Nebulization 300 milliGRAM(s) Inhalation every 12 hours  vancomycin  IVPB 1250 milliGRAM(s) IV Intermittent <User Schedule>    MEDICATIONS  (PRN):  acetaminophen    Suspension .. 650 milliGRAM(s) Oral every 6 hours PRN Temp greater or equal to 38C (100.4F)      PHYSICAL EXAM:    Gen: NAD, laying in bed, mouth closed, breathing through nose    Eyes: PERRLA, conjunctiva pink, anicteric, left eye more difficult to exam due to pt refusing to open either eye    Neurological: not following commands, reacts to stimulus, does not move LE B/L, babinski absent    ENMT: mmm, two staples overlying well healing surgical scar right scalp, nasal trumpet in place, Dobbhoff tube in place w/ TFs running (Jevity), on venti mask    Neck: supple, trachea midline     Pulmonary: rhonchi in upper fields B/L, improving, no increased WOB, no accessory muscle use    Cardiovascular: NSR, S1/S2 present, no mrg, cap refill <2sec    Gastrointestinal: +bs, abdomen soft, mildly distended, obese    Genitourinary: montejo in place, urine light in color, no gross hematuria    Extremities: no edema, no cyanosis    Skin: warm, dry, intact      LABS:  CBC Full  -  ( 08 Oct 2019 04:36 )  WBC Count : 10.86 K/uL  RBC Count : 3.63 M/uL  Hemoglobin : 11.2 g/dL  Hematocrit : 34.6 %  Platelet Count - Automated : 246 K/uL  Mean Cell Volume : 95.3 fl  Mean Cell Hemoglobin : 30.9 pg  Mean Cell Hemoglobin Concentration : 32.4 gm/dL  Auto Neutrophil # : 7.71 K/uL  Auto Lymphocyte # : 1.57 K/uL  Auto Monocyte # : 1.03 K/uL  Auto Eosinophil # : 0.23 K/uL  Auto Basophil # : 0.08 K/uL  Auto Neutrophil % : 71.0 %  Auto Lymphocyte % : 14.5 %  Auto Monocyte % : 9.5 %  Auto Eosinophil % : 2.1 %  Auto Basophil % : 0.7 %    10-08    134<L>  |  96<L>  |  24.0<H>  ----------------------------<  115  4.8   |  29.0  |  0.69    Ca    8.3<L>      08 Oct 2019 04:36  Phos  2.8     10-08  Mg     2.2     10-08      RECENT CULTURES:  10-02 .Sputum Enterobacter cloacae   Moderate White blood cells  Few Gram Negative Rods  Few Gram Positive Cocci in Pairs and Chains  Few Gram Positive Rods   Moderate Enterobacter cloacae  Numerous Routine respiratory maine present    10-01 .CSF XXXX   Few White blood cells  No organisms seen   No growth at 3 days.    10-01 .Blood XXXX XXXX   No growth at 5 days.

## 2019-10-08 NOTE — PROGRESS NOTE ADULT - ASSESSMENT
ASSESSMENT/PLAN:    84yMale presenting with large L basal ganglia hemorrhagic stroke     Neuro: CT Head repeat STABLE, patient continues to follow commands but weakly, continue q4 neuro checks, OOB to chair for ambulation    CV: DNR, NSR, lopressor - -180    Pulm: DNI, attempt to wean venti mask scopolamine for secretions continue pulm toilet, percussion, q2 suctioning    GI/Nutrition: switch TF to Jevity    /Renal: Morris, start Cardura, d/c Morris in 24 hours w/TOV    ID: continue vanc, cefe, tobra d/c 10/9; Flagyl OFF    Lines/Tubes: replace Arturo Valera, PIV    Endo: no issues    Skin: frequent movement to prevent skin breakdown    Proph: SCD, Lovenox 40 qd    Dispo: SICU, pt still at risk for respiratory distress due to continued inability to control secretions  **In terms of goals of care, family approached re: possibility of PEG tube and decided they do not want a PEG tube given that it may be permanent

## 2019-10-08 NOTE — PROGRESS NOTE ADULT - SUBJECTIVE AND OBJECTIVE BOX
Patient lethargic, no eye opening.   Severe right sided head turn.  Attempts to localize, but does not cross midline.  significant tracheal secretions audible.   Medically continues to have fevers. WBC improved.   Head CT (ind rev) - Stable.  CXR shows left effusion.    REVIEW OF SYSTEMS  Constitutional - +fever,  +fatigue  Neurological - +loss of strength    FUNCTIONAL PROGRESS  10/8  Bed Mobility  Bed Mobility Training Sit-to-Supine: dependent (less than 25% patient effort);  2 person assist  Bed Mobility Training Supine-to-Sit: dependent (less than 25% patient effort);  2 person assist  Bed Mobility Training Limitations: decreased ability to use arms for pushing/pulling;  decreased ability to use legs for bridging/pushing;  impaired ability to control trunk for mobility;  decreased strength;  cognitive, decreased safety awareness;  impaired postural control        VITALS  T(C): 37.7 (10-08-19 @ 08:00), Max: 38.2 (10-07-19 @ 18:00)  HR: 62 (10-08-19 @ 12:00) (56 - 85)  BP: 141/77 (10-08-19 @ 12:00) (117/58 - 183/115)  RR: 23 (10-08-19 @ 12:00) (17 - 32)  SpO2: 97% (10-08-19 @ 12:00) (92% - 99%)  Wt(kg): --    MEDICATIONS   acetaminophen    Suspension .. 650 milliGRAM(s) every 6 hours PRN  ALBUTerol    0.083% 2.5 milliGRAM(s) every 6 hours  bromocriptine Tablet 5 milliGRAM(s) <User Schedule>  cefepime   IVPB 2000 milliGRAM(s) every 8 hours  cefepime   IVPB     chlorhexidine 2% Cloths 1 Application(s) daily  doxazosin 1 milliGRAM(s) at bedtime  enoxaparin Injectable 40 milliGRAM(s) daily  melatonin 3 milliGRAM(s) at bedtime  modafinil 100 milliGRAM(s) daily  multiple electrolytes Injection Type 1 1000 milliLiter(s) <Continuous>  pantoprazole   Suspension 40 milliGRAM(s) daily  primidone 50 milliGRAM(s) two times a day  propranolol 20 milliGRAM(s) every 6 hours  scopolamine   Patch 1 Patch every 72 hours  scopolamine   Patch 1 Patch every 72 hours  tobramycin for Nebulization 300 milliGRAM(s) every 12 hours  vancomycin  IVPB 1250 milliGRAM(s) <User Schedule>      RECENT LABS - Reviewed                        11.2   10.86 )-----------( 246      ( 08 Oct 2019 04:36 )             34.6     10-08    134<L>  |  96<L>  |  24.0<H>  ----------------------------<  115  4.8   |  29.0  |  0.69    Ca    8.3<L>      08 Oct 2019 04:36  Phos  2.8     10-08  Mg     2.2     10-08              ----------------------------------------------------------------------------------------  PHYSICAL EXAM  Constitutional - NAD  Extremities - BLE swelling  Neurologic Exam -                    Cognitive - Eyes closed, No command following, Flexion withdrawal to pain     Communication - Nonverbal      Motor - Unable to assess  Psych - Lethargic   ----------------------------------------------------------------------------------------  ASSESSMENT/PLAN  84y Male with functional deficits after an acute left basal ganglia hemorrhage  Left BG hemorrhage - +EVD clamped, repeat head CT 10/4  Pneumonia - Cefepime, Flagyl, Maxiimliano, Scopolamine  Arousal - Consider increasing Bromocriptine 10mg BID, Provigil 100mg Q6AM   Sleep - Melatonin 3mg (10/1)  HTN - Propranolol  Sleep - Melatonin  Pain - Tylenol  Diet - NPO, +NGT   DVT PPX - SCDs, Lovenox  Rehab - Patient medically/surgically being optimized and in guarded state. BG bleed and age as well as prolonged recovery/medical complexities are complicating potential for recovery. At this time, needs total care and may need significant assist long term, and dependent on ongoing day to day progress. Given ongoing condition has not improved, patient's prognosis is poor.

## 2019-10-08 NOTE — PROGRESS NOTE ADULT - PROBLEM SELECTOR PLAN 4
Met with son and wife. Informed them of current condition - poor MS, issues of secretions causing aspiration and respiratory compromise. Son stated he was aware, as they had met with Dr. Faust over the weekend who explained father's condition in detail. He knows father cannot be maintained in this condition for a prolonged time and will have to make decisions soon.  Offered family meeting with his 3 sisters.  Left my contact number.   Wife emotional during our discussion. Emotional support given.

## 2019-10-08 NOTE — CHART NOTE - NSCHARTNOTEFT_GEN_A_CORE
SICU TRANSFER NOTE  -----------------------------  ICU Admission Date: 9/27  Transfer Date: 10-08-19 @ 18:02    Admission Diagnosis: HTN SAH    Active Problems/injuries: Aspiration PNA, Urinary Retention    Procedures: XXXXX INCLUDE DATES    Consultants:  [ ] Cardiology  [ ] Endocrine  [ ] Infectious Disease  [ ] Medicine  [x ]Neurosurgery  [ ] Ortho       [ ] Weight Bearing Status:  [x ] Palliative       [ x] Advanced Directives:    [ ] Physical Medicine and Rehab       [ ] Disposition :   [ ] Plastics  [ ] Pulmonary    Medications  acetaminophen    Suspension .. 650 milliGRAM(s) Oral every 6 hours PRN  ALBUTerol    0.083% 2.5 milliGRAM(s) Nebulizer every 6 hours  bromocriptine Tablet 5 milliGRAM(s) Oral <User Schedule>  cefepime   IVPB 2000 milliGRAM(s) IV Intermittent every 8 hours  cefepime   IVPB      chlorhexidine 2% Cloths 1 Application(s) Topical daily  doxazosin 1 milliGRAM(s) Oral at bedtime  enoxaparin Injectable 40 milliGRAM(s) SubCutaneous daily  melatonin 3 milliGRAM(s) Oral at bedtime  modafinil 100 milliGRAM(s) Oral daily  multiple electrolytes Injection Type 1 1000 milliLiter(s) IV Continuous <Continuous>  pantoprazole   Suspension 40 milliGRAM(s) Oral daily  primidone 50 milliGRAM(s) Oral two times a day  propranolol 20 milliGRAM(s) Oral every 6 hours  scopolamine   Patch 1 Patch Transdermal every 72 hours  scopolamine   Patch 1 Patch Transdermal every 72 hours  tobramycin for Nebulization 300 milliGRAM(s) Inhalation every 12 hours  vancomycin  IVPB 1250 milliGRAM(s) IV Intermittent <User Schedule>      [ x] I attest I have reviewed and reconciled all medications prior to transfer    IV Fluids  sodium chloride 0.9%.: Solution, 1000 milliLiter(s) infuse at 75 mL/Hr  Provider's Contact #: [phone]    Indication: XXXXXX    Antibiotics:  cefepime   IVPB 2000 milliGRAM(s) IV Intermittent every 8 hours  cefepime   IVPB      tobramycin for Nebulization 300 milliGRAM(s) Inhalation every 12 hours  vancomycin  IVPB 1250 milliGRAM(s) IV Intermittent <User Schedule>    Indication: XXXXXXX End Date:XXXXXXX      I have discussed this case with Dr Aragon upon transfer and all questions regarding ICU course were answered.  The following items are to be followed up:    Goals of care conversation that is ongoing with family.  Pt actively being treated for PNA, abx stop date 10/9.  Urinary retention, Cardura started 10/7, TOV planned for 10/9.

## 2019-10-08 NOTE — PROGRESS NOTE ADULT - SUBJECTIVE AND OBJECTIVE BOX
CC:  follow up GOC  INTERVAL HPI/OVERNIGHT EVENTS:  Issues of secretions  MS poor  PRESENT SYMPTOMS: SOURCE:  Patient/Family/Team    PAIN SCALE:  0 = none  1 = mild   2 = moderate  3 = severe    Pain: appears comfortable    Dyspnea:  [ x] YES [ ] NO  Anxiety:  [ ] YES [x ] NO  Fatigue: [ ]x YES [ ] NO  Nausea: [ ] YES [x NO  Loss of Appetite: [ ] YES [ ] NO  on TF   Other symptoms: __________    MEDICATIONS  (STANDING):  ALBUTerol    0.083% 2.5 milliGRAM(s) Nebulizer every 6 hours  bromocriptine Tablet 5 milliGRAM(s) Oral <User Schedule>  cefepime   IVPB 2000 milliGRAM(s) IV Intermittent every 8 hours  cefepime   IVPB      chlorhexidine 2% Cloths 1 Application(s) Topical daily  doxazosin 1 milliGRAM(s) Oral at bedtime  enoxaparin Injectable 40 milliGRAM(s) SubCutaneous daily  melatonin 3 milliGRAM(s) Oral at bedtime  modafinil 100 milliGRAM(s) Oral daily  multiple electrolytes Injection Type 1 1000 milliLiter(s) (42 mL/Hr) IV Continuous <Continuous>  pantoprazole   Suspension 40 milliGRAM(s) Oral daily  primidone 50 milliGRAM(s) Oral two times a day  propranolol 20 milliGRAM(s) Oral every 6 hours  scopolamine   Patch 1 Patch Transdermal every 72 hours  scopolamine   Patch 1 Patch Transdermal every 72 hours  tobramycin for Nebulization 300 milliGRAM(s) Inhalation every 12 hours  vancomycin  IVPB 1250 milliGRAM(s) IV Intermittent <User Schedule>    MEDICATIONS  (PRN):  acetaminophen    Suspension .. 650 milliGRAM(s) Oral every 6 hours PRN Temp greater or equal to 38C (100.4F)      Allergies    No Known Allergies    Intolerances    Karnofsky Performance Score/Palliative Performance Status Version 2: 10   %    Vital Signs Last 24 Hrs  T(C): 37.7 (08 Oct 2019 08:00), Max: 38.2 (07 Oct 2019 18:00)  T(F): 99.9 (08 Oct 2019 08:00), Max: 100.8 (07 Oct 2019 18:00)  HR: 62 (08 Oct 2019 13:00) (56 - 85)  BP: 147/67 (08 Oct 2019 13:00) (117/58 - 183/115)  BP(mean): 96 (08 Oct 2019 13:00) (83 - 122)  RR: 19 (08 Oct 2019 13:00) (17 - 32)  SpO2: 96% (08 Oct 2019 13:00) (92% - 99%)    PHYSICAL EXAM:    General: non responsive to verbal communication    HEENT: [x ] normal  [ ] dry mouth  [ ] ET tube/trach    Lungs: [ x] comfortable [ ] tachypnea/labored breathing  [ ] excessive secretions    CV: [ x] normal  [ ] tachycardia    GI: [x ] normal + NG tube                   : [x ] normal  [ x] incontinent  [ ] oliguria/anuria  [ ] montejo    MSK: [ ] normal  [x ] weakness  [ ] edema             [ ] ambulatory  [x ] bedbound/wheelchair bound    Skin: [ ] normal  [ ] pressure ulcers- Stage_____  [x ] no rash    LABS:                        11.2   10.86 )-----------( 246      ( 08 Oct 2019 04:36 )             34.6     10-08    134<L>  |  96<L>  |  24.0<H>  ----------------------------<  115  4.8   |  29.0  |  0.69    Ca    8.3<L>      08 Oct 2019 04:36  Phos  2.8     10-08  Mg     2.2     10-08      I&O's Summary    07 Oct 2019 07:01  -  08 Oct 2019 07:00  --------------------------------------------------------  IN: 4008 mL / OUT: 4555 mL / NET: -547 mL    08 Oct 2019 07:01  -  08 Oct 2019 14:11  --------------------------------------------------------  IN: 360 mL / OUT: 625 mL / NET: -265 mL        RADIOLOGY & ADDITIONAL STUDIES:  < from: CT Head No Cont (10.07.19 @ 17:42) >  EXAM:  CT BRAIN                          PROCEDURE DATE:  10/07/2019          INTERPRETATION:  Head CT without contrast   COMPARISON: .  CLINICAL INFORMATION: .  TECHNIQUE: Contiguous axial 2.5 mm slice thickness images of the head   were obtainedwithout the use of intravenous contrast media.  This scan was performed using automatic exposure control (radiation dose   reduction software) to obtain a diagnostic image quality scan with   patient dose as low as reasonably achievable.     FINDINGS:  There is less conspicuity of hemorrhage but otherwise no interval change.   No new hemorrhage seen.  RIGHT ventriculostomy catheter has been removed. Pneumocephalus has   resolved. Stable ventricular size. Improving left basal ganglia   hemorrhage   with   dissection into the lateral ventricles. Stable moderate intraventricular   hemorrhage. No hydrocephalus. No new hemorrhage. Stable mild edema in   left   basal ganglia. Areas of subarachnoid hemorrhage no longer visible.   Chronic small rightbasal ganglia lacunar. 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     IMPRESSION: Stable left basal ganglia hemorrhage. Stable   moderate intraventricular hemorrhage. Stable ventricular size. No new   hemorrhage. No hydrocephalus.       < end of copied text >    Thank you for the opportunity to assist with the care of this patient.   [place] Consult Service [phone].

## 2019-10-08 NOTE — CHART NOTE - NSCHARTNOTEFT_GEN_A_CORE
Source: Patient [ ]  Family [ ]   other [ x] EMR and staff     Enteral /Parenteral Nutrition: Diet, NPO with Tube Feed:   Tube Feeding Modality: Nasogastric  Jevity 1.5 Prince  Total Volume for 24 Hours (mL): 1440  Continuous  Starting Tube Feed Rate {mL per Hour}: 60  Until Goal Tube Feed Rate (mL per Hour): 60  Tube Feed Duration (in Hours): 24  Tube Feed Start Time: 12:00 (10-07-19 @ 11:51)      Current Weight:   (10/8) 199 lbs   (10/2) 188 lbs  (10/1) 186.2 lbs  (9/30) 184.9 lbs  (9/27) 187.8 lbs    % Weight Change: Unsure of accuracy of wt's secondary to inconsistency, will continue to monitor wt's for trends      Pertinent Medications: MEDICATIONS  (STANDING):  ALBUTerol    0.083% 2.5 milliGRAM(s) Nebulizer every 6 hours  bromocriptine Tablet 5 milliGRAM(s) Oral <User Schedule>  cefepime   IVPB 2000 milliGRAM(s) IV Intermittent every 8 hours  cefepime   IVPB      chlorhexidine 2% Cloths 1 Application(s) Topical daily  doxazosin 1 milliGRAM(s) Oral at bedtime  enoxaparin Injectable 40 milliGRAM(s) SubCutaneous daily  melatonin 3 milliGRAM(s) Oral at bedtime  modafinil 100 milliGRAM(s) Oral daily  multiple electrolytes Injection Type 1 1000 milliLiter(s) (42 mL/Hr) IV Continuous <Continuous>  pantoprazole  Injectable 40 milliGRAM(s) IV Push daily  primidone 50 milliGRAM(s) Oral two times a day  propranolol 20 milliGRAM(s) Oral every 6 hours  scopolamine   Patch 1 Patch Transdermal every 72 hours  scopolamine   Patch 1 Patch Transdermal every 72 hours  tobramycin for Nebulization 300 milliGRAM(s) Inhalation every 12 hours  vancomycin  IVPB 1250 milliGRAM(s) IV Intermittent <User Schedule>    MEDICATIONS  (PRN):  acetaminophen    Suspension .. 650 milliGRAM(s) Oral every 6 hours PRN Temp greater or equal to 38C (100.4F)    Pertinent Labs: CBC Full  -  ( 08 Oct 2019 04:36 )  WBC Count : 10.86 K/uL  RBC Count : 3.63 M/uL  Hemoglobin : 11.2 g/dL  Hematocrit : 34.6 %  Platelet Count - Automated : 246 K/uL  Mean Cell Volume : 95.3 fl  Mean Cell Hemoglobin : 30.9 pg  Mean Cell Hemoglobin Concentration : 32.4 gm/dL  Auto Neutrophil # : 7.71 K/uL  Auto Lymphocyte # : 1.57 K/uL  Auto Monocyte # : 1.03 K/uL  Auto Eosinophil # : 0.23 K/uL  Auto Basophil # : 0.08 K/uL  Auto Neutrophil % : 71.0 %  Auto Lymphocyte % : 14.5 %  Auto Monocyte % : 9.5 %  Auto Eosinophil % : 2.1 %  Auto Basophil % : 0.7 %        Skin: EVD site to head (EVD now Dc'd)     Nutrition focused physical exam conducted - found signs of malnutrition [ x]absent [ ]present    Subcutaneous fat loss: [ ] Orbital fat pads region, [ ]Buccal fat region, [ ]Triceps region,  [ ]Ribs region    Muscle wasting: [ ]Temples region, [ ]Clavicle region, [ ]Shoulder region, [ ]Scapula region, [ ]Interosseous region,  [ ]thigh region, [ ]Calf region    Estimated Needs:   [x ] no change since previous assessment  [ ] recalculated:     Current Nutrition Diagnosis:  Pt remains at nutrition risk secondary to increased nutrient needs related to increased physiological demand for nutrient as evidenced by large left BGH, intraventricular extension, and EVD placement (now DC'd) Pt continues to have worsening mental status, and continues to require enteral feeds. Pt now receiving enteral feeds of Jevity @ 60ml/hr ([id number]) 1200ml/day; 1800kcal, 77gm protein. Aware family declining PEG placement at this time. Recommendations below:       Recommendations:   1. RX: MVI and Vit. C daily (via tolerated route)   2. Check wt daily to monitor trends  3. Continue with enteral regime at this time.   4. Monitor H/H, BUN       Monitoring and Evaluation:   [ ] PO intake [ x] Tolerance to diet prescription [X] Weights  [X] Follow up per protocol [X] Labs:

## 2019-10-09 DIAGNOSIS — R50.9 FEVER, UNSPECIFIED: ICD-10-CM

## 2019-10-09 LAB
ALBUMIN SERPL ELPH-MCNC: 3 G/DL — LOW (ref 3.3–5.2)
ALP SERPL-CCNC: 60 U/L — SIGNIFICANT CHANGE UP (ref 40–120)
ALT FLD-CCNC: 79 U/L — HIGH
ANION GAP SERPL CALC-SCNC: 11 MMOL/L — SIGNIFICANT CHANGE UP (ref 5–17)
AST SERPL-CCNC: 48 U/L — HIGH
BILIRUB SERPL-MCNC: 0.3 MG/DL — LOW (ref 0.4–2)
BUN SERPL-MCNC: 22 MG/DL — HIGH (ref 8–20)
CALCIUM SERPL-MCNC: 8.5 MG/DL — LOW (ref 8.6–10.2)
CHLORIDE SERPL-SCNC: 96 MMOL/L — LOW (ref 98–107)
CO2 SERPL-SCNC: 27 MMOL/L — SIGNIFICANT CHANGE UP (ref 22–29)
CREAT SERPL-MCNC: 0.7 MG/DL — SIGNIFICANT CHANGE UP (ref 0.5–1.3)
GLUCOSE BLDC GLUCOMTR-MCNC: 121 MG/DL — HIGH (ref 70–99)
GLUCOSE SERPL-MCNC: 134 MG/DL — HIGH (ref 70–115)
MAGNESIUM SERPL-MCNC: 2.3 MG/DL — SIGNIFICANT CHANGE UP (ref 1.6–2.6)
PHOSPHATE SERPL-MCNC: 3.3 MG/DL — SIGNIFICANT CHANGE UP (ref 2.4–4.7)
POTASSIUM SERPL-MCNC: 4.9 MMOL/L — SIGNIFICANT CHANGE UP (ref 3.5–5.3)
POTASSIUM SERPL-SCNC: 4.9 MMOL/L — SIGNIFICANT CHANGE UP (ref 3.5–5.3)
PROT SERPL-MCNC: 6 G/DL — LOW (ref 6.6–8.7)
SODIUM SERPL-SCNC: 134 MMOL/L — LOW (ref 135–145)
TSH SERPL-MCNC: 4.02 UIU/ML — SIGNIFICANT CHANGE UP (ref 0.27–4.2)

## 2019-10-09 PROCEDURE — 99497 ADVNCD CARE PLAN 30 MIN: CPT

## 2019-10-09 PROCEDURE — 99232 SBSQ HOSP IP/OBS MODERATE 35: CPT

## 2019-10-09 PROCEDURE — 99233 SBSQ HOSP IP/OBS HIGH 50: CPT

## 2019-10-09 PROCEDURE — 99223 1ST HOSP IP/OBS HIGH 75: CPT

## 2019-10-09 PROCEDURE — 99233 SBSQ HOSP IP/OBS HIGH 50: CPT | Mod: GC

## 2019-10-09 RX ORDER — PANTOPRAZOLE SODIUM 20 MG/1
40 TABLET, DELAYED RELEASE ORAL DAILY
Refills: 0 | Status: DISCONTINUED | OUTPATIENT
Start: 2019-10-10 | End: 2019-10-15

## 2019-10-09 RX ORDER — ENOXAPARIN SODIUM 100 MG/ML
40 INJECTION SUBCUTANEOUS DAILY
Refills: 0 | Status: DISCONTINUED | OUTPATIENT
Start: 2019-10-10 | End: 2019-10-14

## 2019-10-09 RX ADMIN — SCOPALAMINE 1 PATCH: 1 PATCH, EXTENDED RELEASE TRANSDERMAL at 18:14

## 2019-10-09 RX ADMIN — Medication 650 MILLIGRAM(S): at 00:53

## 2019-10-09 RX ADMIN — PRIMIDONE 50 MILLIGRAM(S): 250 TABLET ORAL at 05:04

## 2019-10-09 RX ADMIN — PRIMIDONE 50 MILLIGRAM(S): 250 TABLET ORAL at 18:11

## 2019-10-09 RX ADMIN — Medication 1 MILLIGRAM(S): at 21:23

## 2019-10-09 RX ADMIN — Medication 166.67 MILLIGRAM(S): at 00:15

## 2019-10-09 RX ADMIN — SCOPALAMINE 1 PATCH: 1 PATCH, EXTENDED RELEASE TRANSDERMAL at 18:11

## 2019-10-09 RX ADMIN — ALBUTEROL 2.5 MILLIGRAM(S): 90 AEROSOL, METERED ORAL at 04:22

## 2019-10-09 RX ADMIN — ALBUTEROL 2.5 MILLIGRAM(S): 90 AEROSOL, METERED ORAL at 20:58

## 2019-10-09 RX ADMIN — Medication 650 MILLIGRAM(S): at 22:30

## 2019-10-09 RX ADMIN — SCOPALAMINE 1 PATCH: 1 PATCH, EXTENDED RELEASE TRANSDERMAL at 21:19

## 2019-10-09 RX ADMIN — CEFEPIME 100 MILLIGRAM(S): 1 INJECTION, POWDER, FOR SOLUTION INTRAMUSCULAR; INTRAVENOUS at 05:04

## 2019-10-09 RX ADMIN — SCOPALAMINE 1 PATCH: 1 PATCH, EXTENDED RELEASE TRANSDERMAL at 07:01

## 2019-10-09 RX ADMIN — BROMOCRIPTINE MESYLATE 5 MILLIGRAM(S): 5 CAPSULE ORAL at 05:04

## 2019-10-09 RX ADMIN — ALBUTEROL 2.5 MILLIGRAM(S): 90 AEROSOL, METERED ORAL at 08:40

## 2019-10-09 RX ADMIN — PANTOPRAZOLE SODIUM 40 MILLIGRAM(S): 20 TABLET, DELAYED RELEASE ORAL at 12:16

## 2019-10-09 RX ADMIN — Medication 300 MILLIGRAM(S): at 09:22

## 2019-10-09 RX ADMIN — ALBUTEROL 2.5 MILLIGRAM(S): 90 AEROSOL, METERED ORAL at 15:32

## 2019-10-09 RX ADMIN — Medication 650 MILLIGRAM(S): at 21:33

## 2019-10-09 RX ADMIN — MODAFINIL 100 MILLIGRAM(S): 200 TABLET ORAL at 12:27

## 2019-10-09 RX ADMIN — Medication 3 MILLIGRAM(S): at 21:22

## 2019-10-09 RX ADMIN — Medication 25 MICROGRAM(S): at 21:25

## 2019-10-09 RX ADMIN — BROMOCRIPTINE MESYLATE 5 MILLIGRAM(S): 5 CAPSULE ORAL at 12:15

## 2019-10-09 NOTE — CONSULT NOTE ADULT - ASSESSMENT
85 y/o M with reported hx of HTN and hypothyroid transferred to [facility] for intracranial hemorrhage management and neurosurgical eval.  Intubated for transfer. EVD placed.  In SICU received cefepime/vanco, inhaled tobramycin for presumed aspiration due to increased secretions, fever, and leukocytosis. Extubated and EVD removed. Now with persistent fever while on Abx.     Overall HTN, intracerebral hemorhage, fever, aspiration pneumonia    - Persistent low grade fevers are likely secondary to persistent left basal ganglia hemorrhage  - Blood cultures NGTD  - Sputum CX enterobacter  - s/p >7 days of HAP/aspiration coverage  - DC abx  - Plan for hospice care      Will sign off. d/w Dr Aragon 85 y/o M with reported hx of HTN and hypothyroid transferred to [facility] for intracranial hemorrhage management and neurosurgical eval.  Intubated for transfer. EVD placed.  In SICU received cefepime/vanco, inhaled tobramycin for presumed aspiration due to increased secretions, fever, and leukocytosis. Extubated and EVD removed. Now with persistent fever while on Abx.     Overall HTN, intracerebral hemorhage, fever, aspiration pneumonia    - Persistent low grade fevers are likely secondary to persistent left basal ganglia hemorrhage  - Blood cultures NGTD  - Sputum CX enterobacter  - s/p >7 days of HAP/aspiration coverage  - DC abx and observe  - Plan likely for hospice care  - Poor prognosis. Will remain at risk for recurrent aspiration.      Will sign off. d/w Dr Aragon 85 y/o M with reported hx of HTN and hypothyroid transferred to [facility] for intracranial hemorrhage management and neurosurgical eval.  Intubated for transfer. EVD placed.  In SICU received cefepime/vanco, inhaled tobramycin for presumed aspiration due to increased secretions infiltrates on CXR, fever, and leukocytosis. Extubated and EVD removed. Now with persistent fever while on Abx.     Overall HTN, intracerebral hemorhage, fever, aspiration pneumonia    - Persistent low grade fevers are likely secondary to persistent left basal ganglia hemorrhage  - Wbc 10  - Blood cultures NGTD  - Sputum CX enterobacter  - s/p >7 days of HAP/aspiration coverage  - DC abx and observe  - Plan likely for hospice care  - Poor prognosis. Will remain at risk for recurrent aspiration.      Will sign off. d/w Dr Aragon

## 2019-10-09 NOTE — PROGRESS NOTE ADULT - ASSESSMENT
83 y/o M with hx of HTN, BPH and hypothyroid transferred to [facility] from [facility] [place] for intracranial hemorrhage management and neurosurgical eval. Pt was intubated for transfer. 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 Upon arrival had emergent EVD placement which was done at the bedside in the ICU. ICH likely secondary to htn emergency, pt was placed on bp control. Also noted with acute respiratory failure with hypoxia, excess secretions and suspected HCAPNA empirically remained on vanco/ cefepime and inhaled tobramycin. Pt was able to be extubated on 10/1. Remains with excess secretions and with low grade fevers tmax: 100.8 unclear if related to current infection/ central fevers. ID consulted for further assessment. S/p R EVD removal 10/4. Radiographically stable on repeat CTh with clearing IVH. Neuro sx/ neuro continued to follow the pt. Pt continues to be unresponsive, minimally responding to commands, remains with NGT in place for feeding, unable to clear his own secretions at times. Continues on scopolamine and suctioning frequently. Overall guarded prognosis, palliative care team continues to follow the patient. Family has agreed to DNR/DNI but to continue with current care.       Seizure   - c/w primidone ngt qhs     Hypothyroid  - tsh wnl   - c/w synthroid 25mcg IV QHS      DVT ppx  - c/w lovenox sq qd   - scd boots b/l    Advanced Directive: DNR DNI   Next of kin: Wife, son, daughters   - Wife and son updated at bedside as stated above in regards to plan of care.   Dispo: Guarded prognosis, would benefit from hospice intervention. Ongoing discussions with the family in regards to the plan of care. 83 y/o M with hx of HTN, BPH and hypothyroid transferred to [facility] from [facility] [place] for intracranial hemorrhage management and neurosurgical eval. Pt was intubated for transfer. Noted at [facility] [place] with acute large left basal ganglia hemorrhage with intraventricular extension. Upon arrival had emergent EVD placement which was done at the bedside in the ICU. ICH likely secondary to htn emergency, pt was placed on bp control. Also noted with acute respiratory failure with hypoxia, excess secretions and suspected HCAPNA empirically remained on vanco/ cefepime and inhaled tobramycin which was discontinued by ID today.  Pt was able to be extubated on 10/1. Remains with excess secretions and with low grade fevers tmax: 100.8 unclear if related to current infection/ central fevers. S/p R EVD removal 10/4. Radiographically stable on repeat CTh with clearing IVH. Neuro sx/ neuro continued to follow the pt. Pt continues to be unresponsive, minimally responding to commands, remains with NGT in place for feeding, unable to clear his own secretions at times. Continues on scopolamine and suctioning frequently. Overall guarded prognosis, palliative care team continues to follow the patient. Family has agreed to DNR/DNI but to continue with current care. Ongoing discussions with the family in regards to the tx plan, pt would benefit from hospice intervention.     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  -s/p placement and removal of EVD  - stable repeat ct head   - As per neuro sx no need for repeat ct head unless further decline or change in mental status  - c/w neurochecks   - no evidence of meaningful recovery  - remains with dysphagia/ unresponsiveness  - NGT to remain in place for now for medications/ feeding   - pt unable to clear secretions  - c/w scopolamine and frequent suctioning   - montejo to remain in place   - neuro f/u noted and appreciated  - neuro sx f/u noted and appreciated   - palliative care f/u noted and appreciated     Fever unclear etiology  - s/p abx tx for HCAPNA/ aspiration PNA, clinical concern that low grade fevers are central   - afebrile   - tmax: 100.3   - leukocytosis   - d/c all abx per ID  -ID consult noted and appreciated 	    Seizure   - c/w primidone ngt qhs     Hypothyroid  - tsh wnl   - c/w synthroid 25mcg IV QHS      DVT ppx  - c/w lovenox sq qd   - scd boots b/l    Advanced Directive: DNR DNI   Next of kin: Wife, son, daughters   - Wife and son updated at bedside as stated above in regards to plan of care.   Dispo: Guarded prognosis, would benefit from hospice intervention. Ongoing discussions with the family in regards to the plan of care.

## 2019-10-09 NOTE — PROGRESS NOTE ADULT - PROBLEM SELECTOR PLAN 4
Spoke with 2 daughters.   Delfina Lenz also present.   They state they know the situation with father's medical condition and  mother is not ready to make any decisions.   Her grandson is getting  on October 20th and she will NOT make any decisions before then.  Cont support.

## 2019-10-09 NOTE — PROGRESS NOTE ADULT - ASSESSMENT
84y Male who is followed by neurology because of ICH    ICH  Likely hypertensive etiology  Avoid anti platelet medications.  Avoid anti coagulant medications.  Off Keppra  Repeat head CT 10/7 grossly stable basal ganglia ICH and intraventricular hemorrhage, no hydrocephalus.  Now DNR.    Hypertension   Control blood pressure.     Exam is worse today, guarded prognosis for meaningful recovery    will follow with you    Oscar Malave MD PhD   [id number]

## 2019-10-09 NOTE — PROGRESS NOTE ADULT - SUBJECTIVE AND OBJECTIVE BOX
CC:  follow up GOC  INTERVAL HPI/OVERNIGHT EVENTS:  transferred to SDU    PRESENT SYMPTOMS: SOURCE:  Patient/Family/Team    PAIN SCALE:  0 = none  1 = mild   2 = moderate  3 = severe    Pain: appears comfortable    Dyspnea:  [ ] YES [ x] NO  Anxiety:  [ ] YES [ x] NO  Fatigue: x[ ] YES [ ] NO  Nausea: [ ] YES [ ] NO na  Loss of Appetite: [ ] YES [ ] NO  npo on TF  Other symptoms: __________    MEDICATIONS  (STANDING):  ALBUTerol    0.083% 2.5 milliGRAM(s) Nebulizer every 6 hours  bromocriptine Tablet 5 milliGRAM(s) Oral <User Schedule>  chlorhexidine 2% Cloths 1 Application(s) Topical daily  doxazosin 1 milliGRAM(s) Oral at bedtime  enoxaparin Injectable 40 milliGRAM(s) SubCutaneous daily  levothyroxine Injectable 25 MICROGram(s) IV Push at bedtime  melatonin 3 milliGRAM(s) Oral at bedtime  modafinil 100 milliGRAM(s) Oral daily  pantoprazole   Suspension 40 milliGRAM(s) Oral daily  primidone 50 milliGRAM(s) Oral two times a day  propranolol 20 milliGRAM(s) Oral every 6 hours  scopolamine   Patch 1 Patch Transdermal every 72 hours  scopolamine   Patch 1 Patch Transdermal every 72 hours    MEDICATIONS  (PRN):  acetaminophen    Suspension .. 650 milliGRAM(s) Oral every 6 hours PRN Temp greater or equal to 38C (100.4F)      Allergies    No Known Allergies    Intolerances      Karnofsky Performance Score/Palliative Performance Status Version 2: 20    %    Vital Signs Last 24 Hrs  T(C): 37.3 (09 Oct 2019 13:20), Max: 38.1 (09 Oct 2019 00:14)  T(F): 99.1 (09 Oct 2019 13:20), Max: 100.5 (09 Oct 2019 00:14)  HR: 65 (09 Oct 2019 12:00) (59 - 75)  BP: 143/60 (09 Oct 2019 12:00) (122/49 - 176/83)  BP(mean): 80 (09 Oct 2019 12:00) (66 - 127)  RR: 23 (09 Oct 2019 12:00) (20 - 23)  SpO2: 97% (09 Oct 2019 12:00) (93% - 99%)    PHYSICAL EXAM:    General: non responsive to verbal or tactile stimuli, NAD    HEENT: [ x] normal  [ ] dry mouth  [ ] ET tube/trach    Lungs: [ x] comfortable [ ] tachypnea/labored breathing  [ ] excessive secretions    CV: [ ]x normal  [ ] tachycardia    GI: [ x] normal  + PEG    : [x ] normal  [x] incontinent  [ ] oliguria/anuria  [ ] montejo    MSK: [ ] normal  [ x] weakness  [ ] edema             [ ] ambulatory  [x ] bedbound/wheelchair bound    Skin: [ ] normal  [ ] pressure ulcers- Stage_____  [x ] no rash    LABS:                        11.2   10.86 )-----------( 246      ( 08 Oct 2019 04:36 )             34.6     10-09    134<L>  |  96<L>  |  22.0<H>  ----------------------------<  134<H>  4.9   |  27.0  |  0.70    Ca    8.5<L>      09 Oct 2019 05:57  Phos  3.3     10-09  Mg     2.3     10-09    TPro  6.0<L>  /  Alb  3.0<L>  /  TBili  0.3<L>  /  DBili  x   /  AST  48<H>  /  ALT  79<H>  /  AlkPhos  60  10-09        I&O's Summary    08 Oct 2019 07:01  -  09 Oct 2019 07:00  --------------------------------------------------------  IN: 2218 mL / OUT: 3750 mL / NET: -1532 mL    09 Oct 2019 07:01  -  09 Oct 2019 13:52  --------------------------------------------------------  IN: 240 mL / OUT: 1050 mL / NET: -810 mL        Thank you for the opportunity to assist with the care of this patient.   [facility] [phone].

## 2019-10-09 NOTE — PROGRESS NOTE ADULT - SUBJECTIVE AND OBJECTIVE BOX
[facility]                                        [facility] [place]                                 Frieda Saunders, & Jose                                  [address]                                        [place], [place], [address]                                             [phone]        CC: intracranial hemorrhage     HPI:   The patient is a 84y Male who presented as a transfer to [facility] for management of ICH.  He apparently may have had seizure and right sided weakness and was brought to [facility].  The patient was transferred to [facility] for intracranial hemorrhage management and neurosurgical eval.  He was intubated for transfer.  An extraventricular drain was placed. This was ultimately removed.    Interim history: transferred to [address] [place] [address], lethargic, not following commands    ROS:  Unobtainable due to patient's condition.     MEDICATIONS  (STANDING):  ALBUTerol    0.083% 2.5 milliGRAM(s) Nebulizer every 6 hours  bromocriptine Tablet 5 milliGRAM(s) Oral <User Schedule>  chlorhexidine 2% Cloths 1 Application(s) Topical daily  doxazosin 1 milliGRAM(s) Oral at bedtime  enoxaparin Injectable 40 milliGRAM(s) SubCutaneous daily  levothyroxine Injectable 25 MICROGram(s) IV Push at bedtime  melatonin 3 milliGRAM(s) Oral at bedtime  modafinil 100 milliGRAM(s) Oral daily  pantoprazole   Suspension 40 milliGRAM(s) Oral daily  primidone 50 milliGRAM(s) Oral two times a day  propranolol 20 milliGRAM(s) Oral every 6 hours  scopolamine   Patch 1 Patch Transdermal every 72 hours  scopolamine   Patch 1 Patch Transdermal every 72 hours    MEDICATIONS  (PRN):  acetaminophen    Suspension .. 650 milliGRAM(s) Oral every 6 hours PRN Temp greater or equal to 38C (100.4F)      Vital Signs Last 24 Hrs  T(C): 37.3 (09 Oct 2019 13:20), Max: 38.1 (09 Oct 2019 00:14)  T(F): 99.1 (09 Oct 2019 13:20), Max: 100.5 (09 Oct 2019 00:14)  HR: 65 (09 Oct 2019 12:00) (59 - 75)  BP: 143/60 (09 Oct 2019 12:00) (122/49 - 176/83)  BP(mean): 80 (09 Oct 2019 12:00) (66 - 127)  RR: 23 (09 Oct 2019 12:00) (20 - 23)  SpO2: 97% (09 Oct 2019 12:00) (93% - 99%)    Detailed Neurologic Exam:    Mental status: The patient is sleepy no longer arouses to voice. He does not instructions. Unable to assess orientation or language    Cranial nerves: Pupils unable to assess, forcefully keeps eyelids shut. Extraocular motion is difficult to assess as he does not open eyes/keep eyes open. Facial musculature is asymmetric with central right weakness. Palate and tongue are difficult to evaluate.     Motor/sensory: There is normal bulk and tone.  There is no tremor.  Moving right minimally (2-3/5) to stimuli.  Moving left 5/5 to stimuli.     Reflexes: diminished throughout and plantar responses are flexor left, extensor right.    Cerebellar: Cannot be assessed.     Labs:                           11.2   10.86 )-----------( 246      ( 08 Oct 2019 04:36 )             34.6     10-09    134<L>  |  96<L>  |  22.0<H>  ----------------------------<  134<H>  4.9   |  27.0  |  0.70    Ca    8.5<L>      09 Oct 2019 05:57  Phos  3.3     10-09  Mg     2.3     10-09    TPro  6.0<L>  /  Alb  3.0<L>  /  TBili  0.3<L>  /  DBili  x   /  AST  48<H>  /  ALT  79<H>  /  AlkPhos  60  10-09    LIVER FUNCTIONS - ( 09 Oct 2019 05:57 )  Alb: 3.0 g/dL / Pro: 6.0 g/dL / ALK PHOS: 60 U/L / ALT: 79 U/L / AST: 48 U/L / GGT: x

## 2019-10-09 NOTE — PROGRESS NOTE ADULT - ASSESSMENT
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 Mental status remains poor, issues of secretions causing likely causing aspiration and respiratory compromise.

## 2019-10-09 NOTE — CONSULT NOTE ADULT - SUBJECTIVE AND OBJECTIVE BOX
[facility]  INFECTIOUS DISEASES AND INTERNAL MEDICINE at [place]  =======================================================  Wesly Grant MD  Diplomates American Board of Internal Medicine and Infectious Diseases  Tel: [phone]      Fax: [phone]  =======================================================      MRN-[id number]  HANNAH HERNANDEZ is a 84y  Male     CC: Patient is a 84y old  Male who presents with a chief complaint of ICH (08 Oct 2019 14:11)    HPI:  Unable to obtain full hx and pt intubated and AMS.    83 y/o M with reported hx of HTN and hypothyroid transferred to [facility] for intracranial hemorrhage management and neurosurgical eval.  Intubated for transfer.  EVD placed. (27 Sep 2019 17:30)      PAST MEDICAL & SURGICAL HISTORY:  Hypothyroid  HTN (hypertension)      Social Hx:    FAMILY HISTORY:      Allergies    No Known Allergies    Intolerances        MEDICATIONS  (STANDING):  ALBUTerol    0.083% 2.5 milliGRAM(s) Nebulizer every 6 hours  bromocriptine Tablet 5 milliGRAM(s) Oral <User Schedule>  chlorhexidine 2% Cloths 1 Application(s) Topical daily  doxazosin 1 milliGRAM(s) Oral at bedtime  enoxaparin Injectable 40 milliGRAM(s) SubCutaneous daily  levothyroxine Injectable 25 MICROGram(s) IV Push at bedtime  melatonin 3 milliGRAM(s) Oral at bedtime  modafinil 100 milliGRAM(s) Oral daily  pantoprazole   Suspension 40 milliGRAM(s) Oral daily  primidone 50 milliGRAM(s) Oral two times a day  propranolol 20 milliGRAM(s) Oral every 6 hours  scopolamine   Patch 1 Patch Transdermal every 72 hours  scopolamine   Patch 1 Patch Transdermal every 72 hours    MEDICATIONS  (PRN):  acetaminophen    Suspension .. 650 milliGRAM(s) Oral every 6 hours PRN Temp greater or equal to 38C (100.4F)      ANTIMICROBIALS:      OTHER MEDS: MEDICATIONS  (STANDING):  acetaminophen    Suspension .. 650 every 6 hours PRN  ALBUTerol    0.083% 2.5 every 6 hours  bromocriptine Tablet 5 <User Schedule>  doxazosin 1 at bedtime  enoxaparin Injectable 40 daily  levothyroxine Injectable 25 at bedtime  melatonin 3 at bedtime  modafinil 100 daily  pantoprazole   Suspension 40 daily  primidone 50 two times a day  propranolol 20 every 6 hours  scopolamine   Patch 1 every 72 hours  scopolamine   Patch 1 every 72 hours             REVIEW OF SYSTEMS:  CONSTITUTIONAL:  No Fever or chills  HEENT:  No diplopia or blurred vision.  No earache, sore throat or runny nose.  CARDIOVASCULAR:  No pressure, squeezing, strangling, tightness, heaviness or aching about the chest, neck, axilla or epigastrium.  RESPIRATORY:  No cough, shortness of breath  GASTROINTESTINAL:  No nausea, vomiting or diarrhea.  GENITOURINARY:  No dysuria, frequency or urgency. No Blood in urine  MUSCULOSKELETAL:  no joint aches, no muscle pain  SKIN:  No change in skin, hair or nails.  NEUROLOGIC:  No Headaches, seizures or weakness.  PSYCHIATRIC:  No disorder of thought or mood.  ENDOCRINE:  No heat or cold intolerance  HEMATOLOGICAL:  No easy bruising or bleeding.           I&O's Detail    08 Oct 2019 07:01  -  09 Oct 2019 07:00  --------------------------------------------------------  IN:    Enteral Tube Flush: 100 mL    multiple electrolytes Injection Type 1multiple electrolytes Injection Type 1: 378 mL    Pivot 1.5: 1440 mL    Solution: 50 mL    Solution: 250 mL  Total IN: 2218 mL    OUT:    Indwelling Catheter - Urethral: 3750 mL  Total OUT: 3750 mL    Total NET: -1532 mL      09 Oct 2019 07:01  -  09 Oct 2019 13:09  --------------------------------------------------------  IN:    Pivot 1.5: 240 mL  Total IN: 240 mL    OUT:    Indwelling Catheter - Urethral: 1050 mL  Total OUT: 1050 mL    Total NET: -810 mL            Physical Exam:  Vital Signs Last 24 Hrs  T(C): 37.2 (09 Oct 2019 08:35), Max: 38.1 (09 Oct 2019 00:14)  T(F): 99 (09 Oct 2019 08:35), Max: 100.5 (09 Oct 2019 00:14)  HR: 65 (09 Oct 2019 12:00) (59 - 75)  BP: 143/60 (09 Oct 2019 12:00) (122/49 - 176/83)  BP(mean): 80 (09 Oct 2019 12:00) (66 - 127)  RR: 23 (09 Oct 2019 12:00) (20 - 23)  SpO2: 97% (09 Oct 2019 12:00) (93% - 99%)    GEN: NAD, pleasant  HEENT: normocephalic and atraumatic. EOMI. PERRL.  Anicteric  NECK: Supple.   LUNGS: Clear to auscultation.  HEART: Regular rate and rhythm without murmur.  ABDOMEN: Soft, nontender, and nondistended.  Positive bowel sounds.    : No CVA tenderness  EXTREMITIES: Without any edema.  MSK: No joint swelling  NEUROLOGIC: Cranial nerves II through XII are grossly intact. No Focal Deficits  PSYCHIATRIC: Appropriate affect .  SKIN: No Rash        Labs:  10-09    134<L>  |  96<L>  |  22.0<H>  ----------------------------<  134<H>  4.9   |  27.0  |  0.70    Ca    8.5<L>      09 Oct 2019 05:57  Phos  3.3     10-09  Mg     2.3     10-09    TPro  6.0<L>  /  Alb  3.0<L>  /  TBili  0.3<L>  /  DBili  x   /  AST  48<H>  /  ALT  79<H>  /  AlkPhos  60  10-09                          11.2   10.86 )-----------( 246      ( 08 Oct 2019 04:36 )             34.6           LIVER FUNCTIONS - ( 09 Oct 2019 05:57 )  Alb: 3.0 g/dL / Pro: 6.0 g/dL / ALK PHOS: 60 U/L / ALT: 79 U/L / AST: 48 U/L / GGT: x               CAPILLARY BLOOD GLUCOSE      POCT Blood Glucose.: 121 mg/dL (09 Oct 2019 12:38)        RECENT CULTURES:  10-02 @ 14:11 .Sputum Enterobacter cloacae    Moderate Enterobacter cloacae  Numerous Routine respiratory maine present    Moderate White blood cells  Few Gram Negative Rods  Few Gram Positive Cocci in Pairs and Chains  Few Gram Positive Rods      10-01 @ 18:13 .CSF     No growth at 3 days.    Few White blood cells  No organisms seen      10-01 @ 10:00 .Blood     No growth at 5 days.              Radiology:  < from: CT Head No Cont (10.07.19 @ 17:42) >    FINDINGS:  There is less conspicuity of hemorrhage but otherwise no interval change.   No new hemorrhage seen.  RIGHT ventriculostomy catheter has been removed. Pneumocephalus has   resolved. Stable ventricular size. Improving left basal ganglia   hemorrhage   with   dissection into the lateral ventricles. Stable moderate intraventricular   hemorrhage. No hydrocephalus. No new hemorrhage. Stable mild edema in   left   basal ganglia. Areas of subarachnoid hemorrhage no longer visible.   Chronic small rightbasal ganglia lacunar. 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     IMPRESSION: Stable left basal ganglia hemorrhage. Stable   moderate intraventricular hemorrhage. Stable ventricular size. No new   hemorrhage. No hydrocephalus.     < end of copied text >    < from: Xray Chest 1 View- PORTABLE-Urgent (10.04.19 @ 13:22) >  FINDINGS: Feeding NG tube tip in fundus body of stomach.  The lungs  show unchanged are diffuse perihilar LEFT diaphragm not   clearly visualized indicating LEFT lower lobe airspace consolidation   and/or effusion. Interstitial opacities.    The  heart is enlarged in transverse diameter. No hilar mass. Trachea   midline.   Visualized osseous structures are intact.        IMPRESSION:   NG tube tip in stomach. Otherwise no intervalchange..    < end of copied text > [facility]  INFECTIOUS DISEASES AND INTERNAL MEDICINE at [place]  =======================================================  Wesly Grant MD  Diplomates American Board of Internal Medicine and Infectious Diseases  Tel: [phone]      Fax: [phone]  =======================================================      [id number]  HANNAH HERNANDEZ is a 84y  Male     CC: Patient is a 84y old  Male who presents with a chief complaint of ICH (08 Oct 2019 14:11)    HPI:  Unable to obtain full hx and pt intubated and AMS.    83 y/o M with reported hx of HTN and hypothyroid transferred to [facility] for intracranial hemorrhage management and neurosurgical eval.  Intubated for transfer.  EVD placed. (27 Sep 2019 17:30)      PAST MEDICAL & SURGICAL HISTORY:  Hypothyroid  HTN (hypertension)      Social Hx:    FAMILY HISTORY:      Allergies    No Known Allergies    Intolerances        MEDICATIONS  (STANDING):  ALBUTerol    0.083% 2.5 milliGRAM(s) Nebulizer every 6 hours  bromocriptine Tablet 5 milliGRAM(s) Oral <User Schedule>  chlorhexidine 2% Cloths 1 Application(s) Topical daily  doxazosin 1 milliGRAM(s) Oral at bedtime  enoxaparin Injectable 40 milliGRAM(s) SubCutaneous daily  levothyroxine Injectable 25 MICROGram(s) IV Push at bedtime  melatonin 3 milliGRAM(s) Oral at bedtime  modafinil 100 milliGRAM(s) Oral daily  pantoprazole   Suspension 40 milliGRAM(s) Oral daily  primidone 50 milliGRAM(s) Oral two times a day  propranolol 20 milliGRAM(s) Oral every 6 hours  scopolamine   Patch 1 Patch Transdermal every 72 hours  scopolamine   Patch 1 Patch Transdermal every 72 hours    MEDICATIONS  (PRN):  acetaminophen    Suspension .. 650 milliGRAM(s) Oral every 6 hours PRN Temp greater or equal to 38C (100.4F)      ANTIMICROBIALS:      OTHER MEDS: MEDICATIONS  (STANDING):  acetaminophen    Suspension .. 650 every 6 hours PRN  ALBUTerol    0.083% 2.5 every 6 hours  bromocriptine Tablet 5 <User Schedule>  doxazosin 1 at bedtime  enoxaparin Injectable 40 daily  levothyroxine Injectable 25 at bedtime  melatonin 3 at bedtime  modafinil 100 daily  pantoprazole   Suspension 40 daily  primidone 50 two times a day  propranolol 20 every 6 hours  scopolamine   Patch 1 every 72 hours  scopolamine   Patch 1 every 72 hours             REVIEW OF SYSTEMS:  unable to obtain  HEMATOLOGICAL:  No easy bruising or bleeding.           I&O's Detail    08 Oct 2019 07:01  -  09 Oct 2019 07:00  --------------------------------------------------------  IN:    Enteral Tube Flush: 100 mL    multiple electrolytes Injection Type 1multiple electrolytes Injection Type 1: 378 mL    Pivot 1.5: 1440 mL    Solution: 50 mL    Solution: 250 mL  Total IN: 2218 mL    OUT:    Indwelling Catheter - Urethral: 3750 mL  Total OUT: 3750 mL    Total NET: -1532 mL      09 Oct 2019 07:01  -  09 Oct 2019 13:09  --------------------------------------------------------  IN:    Pivot 1.5: 240 mL  Total IN: 240 mL    OUT:    Indwelling Catheter - Urethral: 1050 mL  Total OUT: 1050 mL    Total NET: -810 mL            Physical Exam:  Vital Signs Last 24 Hrs  T(C): 37.2 (09 Oct 2019 08:35), Max: 38.1 (09 Oct 2019 00:14)  T(F): 99 (09 Oct 2019 08:35), Max: 100.5 (09 Oct 2019 00:14)  HR: 65 (09 Oct 2019 12:00) (59 - 75)  BP: 143/60 (09 Oct 2019 12:00) (122/49 - 176/83)  BP(mean): 80 (09 Oct 2019 12:00) (66 - 127)  RR: 23 (09 Oct 2019 12:00) (20 - 23)  SpO2: 97% (09 Oct 2019 12:00) (93% - 99%)    GEN: NAD, obtunded, on O2, NGT  HEENT: normocephalic and atraumatic. EOMI. PERRL.  Anicteric  NECK: Supple.   LUNGS: Clear to auscultation.  HEART: Regular rate and rhythm without murmur.  ABDOMEN: Soft, nontender, and nondistended.  Positive bowel sounds.    : No CVA tenderness  EXTREMITIES: Without any edema.  MSK: No joint swelling  NEUROLOGIC: not assessed. EVD site clean  PSYCHIATRIC:not assessed  SKIN: No Rash        Labs:  10-09    134<L>  |  96<L>  |  22.0<H>  ----------------------------<  134<H>  4.9   |  27.0  |  0.70    Ca    8.5<L>      09 Oct 2019 05:57  Phos  3.3     10-09  Mg     2.3     10-09    TPro  6.0<L>  /  Alb  3.0<L>  /  TBili  0.3<L>  /  DBili  x   /  AST  48<H>  /  ALT  79<H>  /  AlkPhos  60  10-09                          11.2   10.86 )-----------( 246      ( 08 Oct 2019 04:36 )             34.6           LIVER FUNCTIONS - ( 09 Oct 2019 05:57 )  Alb: 3.0 g/dL / Pro: 6.0 g/dL / ALK PHOS: 60 U/L / ALT: 79 U/L / AST: 48 U/L / GGT: x               CAPILLARY BLOOD GLUCOSE      POCT Blood Glucose.: 121 mg/dL (09 Oct 2019 12:38)        RECENT CULTURES:  10-02 @ 14:11 .Sputum Enterobacter cloacae    Moderate Enterobacter cloacae  Numerous Routine respiratory maine present    Moderate White blood cells  Few Gram Negative Rods  Few Gram Positive Cocci in Pairs and Chains  Few Gram Positive Rods      10-01 @ 18:13 .CSF     No growth at 3 days.    Few White blood cells  No organisms seen      10-01 @ 10:00 .Blood     No growth at 5 days.              Radiology:  < from: CT Head No Cont (10.07.19 @ 17:42) >    FINDINGS:  There is less conspicuity of hemorrhage but otherwise no interval change.   No new hemorrhage seen.  RIGHT ventriculostomy catheter has been removed. Pneumocephalus has   resolved. Stable ventricular size. Improving left basal ganglia   hemorrhage   with   dissection into the lateral ventricles. Stable moderate intraventricular   hemorrhage. No hydrocephalus. No new hemorrhage. Stable mild edema in   left   basal ganglia. Areas of subarachnoid hemorrhage no longer visible.   Chronic small rightbasal ganglia lacunar. 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     IMPRESSION: Stable left basal ganglia hemorrhage. Stable   moderate intraventricular hemorrhage. Stable ventricular size. No new   hemorrhage. No hydrocephalus.     < end of copied text >    < from: Xray Chest 1 View- PORTABLE-Urgent (10.04.19 @ 13:22) >  FINDINGS: Feeding NG tube tip in fundus body of stomach.  The lungs  show unchanged are diffuse perihilar LEFT diaphragm not   clearly visualized indicating LEFT lower lobe airspace consolidation   and/or effusion. Interstitial opacities.    The  heart is enlarged in transverse diameter. No hilar mass. Trachea   midline.   Visualized osseous structures are intact.        IMPRESSION:   NG tube tip in stomach. Otherwise no intervalchange..    < end of copied text > [facility]  INFECTIOUS DISEASES AND INTERNAL MEDICINE at [place]  =======================================================  Wesly Grant MD  Diplomates American Board of Internal Medicine and Infectious Diseases  Tel: [phone]      Fax: [phone]  =======================================================      [id number]  HANNAH HERNANDEZ is a 84y  Male     CC: Patient is a 84y old  Male who presents with a chief complaint of ICH (08 Oct 2019 14:11)    HPI:  Unable to obtain full hx and pt intubated and AMS.    85 y/o M with reported hx of HTN and hypothyroid transferred to [facility] for intracranial hemorrhage management and neurosurgical eval.  Intubated for transfer.  EVD placed. (27 Sep 2019 17:30)      PAST MEDICAL & SURGICAL HISTORY:  Hypothyroid  HTN (hypertension)      Social Hx: cannot obtain    FAMILY HISTORY: cannot obtain      Allergies    No Known Allergies    Intolerances        MEDICATIONS  (STANDING):  ALBUTerol    0.083% 2.5 milliGRAM(s) Nebulizer every 6 hours  bromocriptine Tablet 5 milliGRAM(s) Oral <User Schedule>  chlorhexidine 2% Cloths 1 Application(s) Topical daily  doxazosin 1 milliGRAM(s) Oral at bedtime  enoxaparin Injectable 40 milliGRAM(s) SubCutaneous daily  levothyroxine Injectable 25 MICROGram(s) IV Push at bedtime  melatonin 3 milliGRAM(s) Oral at bedtime  modafinil 100 milliGRAM(s) Oral daily  pantoprazole   Suspension 40 milliGRAM(s) Oral daily  primidone 50 milliGRAM(s) Oral two times a day  propranolol 20 milliGRAM(s) Oral every 6 hours  scopolamine   Patch 1 Patch Transdermal every 72 hours  scopolamine   Patch 1 Patch Transdermal every 72 hours    MEDICATIONS  (PRN):  acetaminophen    Suspension .. 650 milliGRAM(s) Oral every 6 hours PRN Temp greater or equal to 38C (100.4F)      ANTIMICROBIALS:      OTHER MEDS: MEDICATIONS  (STANDING):  acetaminophen    Suspension .. 650 every 6 hours PRN  ALBUTerol    0.083% 2.5 every 6 hours  bromocriptine Tablet 5 <User Schedule>  doxazosin 1 at bedtime  enoxaparin Injectable 40 daily  levothyroxine Injectable 25 at bedtime  melatonin 3 at bedtime  modafinil 100 daily  pantoprazole   Suspension 40 daily  primidone 50 two times a day  propranolol 20 every 6 hours  scopolamine   Patch 1 every 72 hours  scopolamine   Patch 1 every 72 hours             REVIEW OF SYSTEMS:  unable to obtain  HEMATOLOGICAL:  No easy bruising or bleeding.           I&O's Detail    08 Oct 2019 07:01  -  09 Oct 2019 07:00  --------------------------------------------------------  IN:    Enteral Tube Flush: 100 mL    multiple electrolytes Injection Type 1multiple electrolytes Injection Type 1: 378 mL    Pivot 1.5: 1440 mL    Solution: 50 mL    Solution: 250 mL  Total IN: 2218 mL    OUT:    Indwelling Catheter - Urethral: 3750 mL  Total OUT: 3750 mL    Total NET: -1532 mL      09 Oct 2019 07:01  -  09 Oct 2019 13:09  --------------------------------------------------------  IN:    Pivot 1.5: 240 mL  Total IN: 240 mL    OUT:    Indwelling Catheter - Urethral: 1050 mL  Total OUT: 1050 mL    Total NET: -810 mL            Physical Exam:  Vital Signs Last 24 Hrs  T(C): 37.2 (09 Oct 2019 08:35), Max: 38.1 (09 Oct 2019 00:14)  T(F): 99 (09 Oct 2019 08:35), Max: 100.5 (09 Oct 2019 00:14)  HR: 65 (09 Oct 2019 12:00) (59 - 75)  BP: 143/60 (09 Oct 2019 12:00) (122/49 - 176/83)  BP(mean): 80 (09 Oct 2019 12:00) (66 - 127)  RR: 23 (09 Oct 2019 12:00) (20 - 23)  SpO2: 97% (09 Oct 2019 12:00) (93% - 99%)    GEN: NAD, obtunded, on O2, NGT  HEENT: normocephalic and atraumatic. EOMI. PERRL.  Anicteric  NECK: Supple.   LUNGS: Clear to auscultation.  HEART: Regular rate and rhythm without murmur.  ABDOMEN: Soft, nontender, and nondistended.  Positive bowel sounds.    : No CVA tenderness  EXTREMITIES: Without any edema.  MSK: No joint swelling  NEUROLOGIC: not assessed. EVD site clean  PSYCHIATRIC:not assessed  SKIN: No Rash        Labs:  10-09    134<L>  |  96<L>  |  22.0<H>  ----------------------------<  134<H>  4.9   |  27.0  |  0.70    Ca    8.5<L>      09 Oct 2019 05:57  Phos  3.3     10-09  Mg     2.3     10-09    TPro  6.0<L>  /  Alb  3.0<L>  /  TBili  0.3<L>  /  DBili  x   /  AST  48<H>  /  ALT  79<H>  /  AlkPhos  60  10-09                          11.2   10.86 )-----------( 246      ( 08 Oct 2019 04:36 )             34.6           LIVER FUNCTIONS - ( 09 Oct 2019 05:57 )  Alb: 3.0 g/dL / Pro: 6.0 g/dL / ALK PHOS: 60 U/L / ALT: 79 U/L / AST: 48 U/L / GGT: x               CAPILLARY BLOOD GLUCOSE      POCT Blood Glucose.: 121 mg/dL (09 Oct 2019 12:38)        RECENT CULTURES:  10-02 @ 14:11 .Sputum Enterobacter cloacae    Moderate Enterobacter cloacae  Numerous Routine respiratory maine present    Moderate White blood cells  Few Gram Negative Rods  Few Gram Positive Cocci in Pairs and Chains  Few Gram Positive Rods      10-01 @ 18:13 .CSF     No growth at 3 days.    Few White blood cells  No organisms seen      10-01 @ 10:00 .Blood     No growth at 5 days.              Radiology:  < from: CT Head No Cont (10.07.19 @ 17:42) >    FINDINGS:  There is less conspicuity of hemorrhage but otherwise no interval change.   No new hemorrhage seen.  RIGHT ventriculostomy catheter has been removed. Pneumocephalus has   resolved. Stable ventricular size. Improving left basal ganglia   hemorrhage   with   dissection into the lateral ventricles. Stable moderate intraventricular   hemorrhage. No hydrocephalus. No new hemorrhage. Stable mild edema in   left   basal ganglia. Areas of subarachnoid hemorrhage no longer visible.   Chronic small rightbasal ganglia lacunar. 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     IMPRESSION: Stable left basal ganglia hemorrhage. Stable   moderate intraventricular hemorrhage. Stable ventricular size. No new   hemorrhage. No hydrocephalus.     < end of copied text >    < from: Xray Chest 1 View- PORTABLE-Urgent (10.04.19 @ 13:22) >  FINDINGS: Feeding NG tube tip in fundus body of stomach.  The lungs  show unchanged are diffuse perihilar LEFT diaphragm not   clearly visualized indicating LEFT lower lobe airspace consolidation   and/or effusion. Interstitial opacities.    The  heart is enlarged in transverse diameter. No hilar mass. Trachea   midline.   Visualized osseous structures are intact.        IMPRESSION:   NG tube tip in stomach. Otherwise no intervalchange..    < end of copied text >

## 2019-10-09 NOTE — PROGRESS NOTE ADULT - SUBJECTIVE AND OBJECTIVE BOX
Patient is a 84y old  Male who presents with a chief complaint of ICH (09 Oct 2019 17:03) Remains unresponsive       HEALTH ISSUES - PROBLEM Dx:  Fever: Fever  HTN (hypertension): HTN (hypertension)  Dysphagia: Dysphagia  Aspiration pneumonia, unspecified aspiration pneumonia type, unspecified laterality, unspecified part of lung: Aspiration pneumonia, unspecified aspiration pneumonia type, unspecified laterality, unspecified part of lung  Advance care planning: Advance care planning  Pneumonia: Pneumonia  Encounter for palliative care: Encounter for palliative care  Basal ganglia hemorrhage: Basal ganglia hemorrhage  Hypothyroid: Hypothyroid  Acute respiratory failure with hypoxia: Acute respiratory failure with hypoxia  Hypertensive emergency: Hypertensive emergency  Intraventricular hemorrhage: Intraventricular hemorrhage  Hemorrhagic stroke: Hemorrhagic stroke  Intracranial bleed: Intracranial bleed      INTERVAL HPI/OVERNIGHT EVENTS:  Patient seen and examined at bedside. No acute events overnight. Patient lying in bed comfortably, wife and son at bedside. D/w them in detail and depth in regards to the patients current guarded prognosis. They had extensive family discussion and meeting with the palliative care team. During this meeting they expressed concern over the prognosis/ timing in regards to his time left. They belived they  had a degree of control over this and stated they have their grandsons wedding coming on 10/20 and would not be able to do any decision making prior to this. D/w them that the patients quality of life matters, that everyone needs to focus on his wishes and respect them. Also stressed that ngt is a temporary means for feeding and cannot remain in the patient long term. That there are benefits in regards to ngt bc pt gains nutrition but also multiple side effects with long term use including aspiration/ infection. Also d/w them that the patient is unable to clear his own secretions and at any time he can have an aspitation event from this. Discussed trach and the risks involved and explained to them that the longer it takes for the family to make a decision the longer the patient may suffer in this current state. Wife at bedside became tearful and continued to ask "how long does he have". Emotional support provided and recommended to her to respect her husbands wishes and keep talking to him about his memories, bring in music that he loved to listen to. She expressed that he would not want a trach or peg but she finds it very hard to make any decisions more so bc he is the love of her life and they spent all their time together. At the end of the meeting the son wanted time to discuss with his mother alone the conversation and then go back and discuss with his family.     Advanced care planning took 42 minutes     Vital Signs Last 24 Hrs  T(C): 38.1 (09 Oct 2019 21:25), Max: 38.1 (09 Oct 2019 00:14)  T(F): 100.6 (09 Oct 2019 21:25), Max: 100.6 (09 Oct 2019 21:25)  HR: 65 (09 Oct 2019 21:25) (59 - 75)  BP: 134/53 (09 Oct 2019 21:25) (122/49 - 145/58)  BP(mean): 74 (09 Oct 2019 21:25) (66 - 80)  RR: 20 (09 Oct 2019 20:07) (18 - 23)  SpO2: 94% (09 Oct 2019 21:00) (94% - 99%)    CAPILLARY BLOOD GLUCOSE  POCT Blood Glucose.: 121 mg/dL (09 Oct 2019 12:38)      I&O's Summary    08 Oct 2019 07:01  -  09 Oct 2019 07:00  --------------------------------------------------------  IN: 2218 mL / OUT: 3750 mL / NET: -1532 mL    09 Oct 2019 07:01  -  09 Oct 2019 22:07  --------------------------------------------------------  IN: 1340 mL / OUT: 2650 mL / NET: -1310 mL      CONSTITUTIONAL: Ill appearing, well nourished,  NAD  ENMT: NGT in place   EYES: Pt refusing to open eyes   CARDIAC: Normal rate, regular rhythm.  Heart sounds S1, S2.  No murmurs, rubs or gallops   RESPIRATORY: Breath sounds clear and equal bilaterally. No wheezes, rhales or rhonchi  GASTROENTEROLOGY: Soft nt nd bs +normoactive   EXTREMITIES: generalized anasarca, no cyanosis or deformity   NEUROLOGICAL: Non verbal, unable to assess bc pt is not following commands   SKIN: No rash, skin turgor poor      MEDICATIONS  (STANDING):  ALBUTerol    0.083% 2.5 milliGRAM(s) Nebulizer every 6 hours  bromocriptine Tablet 5 milliGRAM(s) Oral <User Schedule>  chlorhexidine 2% Cloths 1 Application(s) Topical daily  doxazosin 1 milliGRAM(s) Oral at bedtime  levothyroxine Injectable 25 MICROGram(s) IV Push at bedtime  melatonin 3 milliGRAM(s) Oral at bedtime  modafinil 100 milliGRAM(s) Oral daily  primidone 50 milliGRAM(s) Oral two times a day  propranolol 20 milliGRAM(s) Oral every 6 hours  scopolamine   Patch 1 Patch Transdermal every 72 hours  scopolamine   Patch 1 Patch Transdermal every 72 hours    MEDICATIONS  (PRN):  acetaminophen    Suspension .. 650 milliGRAM(s) Oral every 6 hours PRN Temp greater or equal to 38C (100.4F)      LABS:                        11.2   10.86 )-----------( 246      ( 08 Oct 2019 04:36 )             34.6     10-09    134<L>  |  96<L>  |  22.0<H>  ----------------------------<  134<H>  4.9   |  27.0  |  0.70    Ca    8.5<L>      09 Oct 2019 05:57  Phos  3.3     10-09  Mg     2.3     10-09    TPro  6.0<L>  /  Alb  3.0<L>  /  TBili  0.3<L>  /  DBili  x   /  AST  48<H>  /  ALT  79<H>  /  AlkPhos  60  10-09        LIVER FUNCTIONS - ( 09 Oct 2019 05:57 )  Alb: 3.0 g/dL / Pro: 6.0 g/dL / ALK PHOS: 60 U/L / ALT: 79 U/L / AST: 48 U/L / GGT: x             RADIOLOGY & ADDITIONAL TESTS:  no new imaging studies

## 2019-10-09 NOTE — PROGRESS NOTE ADULT - SUBJECTIVE AND OBJECTIVE BOX
Patient seen at bedside with wife and daughters present  No eye opening today and not following commands today.  +Grimace to pain , +attempts to withdraw from pain LUE  Patient presents with head turned to right with UE flexion posturing with rigidity  Last CT head 10/7 with stable left basal ganglia hemorrhage     REVIEW OF SYSTEMS  Constitutional -  +fatigue  Neurological - +loss of strength    CURRENT FUNCTIONAL STATUS  PT 10/8  Bed Mobility  Bed Mobility Training Sit-to-Supine: dependent (less than 25% patient effort);  2 person assist  Bed Mobility Training Supine-to-Sit: dependent (less than 25% patient effort);  2 person assist  Bed Mobility Training Limitations: decreased ability to use arms for pushing/pulling;  decreased ability to use legs for bridging/pushing;  impaired ability to control trunk for mobility;  decreased strength;  cognitive, decreased safety awareness;  impaired postural control    Therapeutic Exercise  Therapeutic Exercise Detail: PROM of bilateral ankle PF/DF, ankle inversion/eversion, hip abduction/adduction, single knee to chest (hip flexion+knee flexion/hip extension+knee extension), wrist flexion/extension, elbow flexion/extension x 10 reps each. Pt tolerated PROM with no facial signs of discomfort.     Neuromuscular Re-education  Neuromuscular Re-education Detail: Sitting at edge of bed, pt was signficantly leaning towards the Right. Upon transition from supine to sit pt actively reached out with Left UE immediately. Pt was able to maintain sitting balance for ~5 seconds with bilateral UE support, then required maxA/dependent from PT assist.     RECENT LABS/IMAGING  CBC Full  -  ( 08 Oct 2019 04:36 )  WBC Count : 10.86 K/uL  RBC Count : 3.63 M/uL  Hemoglobin : 11.2 g/dL  Hematocrit : 34.6 %  Platelet Count - Automated : 246 K/uL  Mean Cell Volume : 95.3 fl  Mean Cell Hemoglobin : 30.9 pg  Mean Cell Hemoglobin Concentration : 32.4 gm/dL  Auto Neutrophil # : 7.71 K/uL  Auto Lymphocyte # : 1.57 K/uL  Auto Monocyte # : 1.03 K/uL  Auto Eosinophil # : 0.23 K/uL  Auto Basophil # : 0.08 K/uL  Auto Neutrophil % : 71.0 %  Auto Lymphocyte % : 14.5 %  Auto Monocyte % : 9.5 %  Auto Eosinophil % : 2.1 %  Auto Basophil % : 0.7 %    10-09    134<L>  |  96<L>  |  22.0<H>  ----------------------------<  134<H>  4.9   |  27.0  |  0.70    Ca    8.5<L>      09 Oct 2019 05:57  Phos  3.3     10-09  Mg     2.3     10-09    TPro  6.0<L>  /  Alb  3.0<L>  /  TBili  0.3<L>  /  DBili  x   /  AST  48<H>  /  ALT  79<H>  /  AlkPhos  60  10-09    < from: CT Head No Cont (10.07.19 @ 17:42) >   Stable left basal ganglia hemorrhage. Stable moderate intraventricular hemorrhage. Stable ventricular size. No new hemorrhage. No hydrocephalus.     VITALS  T(C): 37.2 (10-09-19 @ 08:35), Max: 38.1 (10-09-19 @ 00:14)  HR: 65 (10-09-19 @ 12:00) (59 - 75)  BP: 143/60 (10-09-19 @ 12:00) (122/49 - 176/83)  RR: 23 (10-09-19 @ 12:00) (19 - 23)  SpO2: 97% (10-09-19 @ 12:00) (93% - 99%)  Wt(kg): --    ALLERGIES  No Known Allergies      MEDICATIONS   acetaminophen    Suspension .. 650 milliGRAM(s) Oral every 6 hours PRN  ALBUTerol    0.083% 2.5 milliGRAM(s) Nebulizer every 6 hours  bromocriptine Tablet 5 milliGRAM(s) Oral <User Schedule>  cefepime   IVPB 2000 milliGRAM(s) IV Intermittent every 8 hours  cefepime   IVPB      chlorhexidine 2% Cloths 1 Application(s) Topical daily  doxazosin 1 milliGRAM(s) Oral at bedtime  enoxaparin Injectable 40 milliGRAM(s) SubCutaneous daily  levothyroxine Injectable 25 MICROGram(s) IV Push at bedtime  melatonin 3 milliGRAM(s) Oral at bedtime  modafinil 100 milliGRAM(s) Oral daily  pantoprazole   Suspension 40 milliGRAM(s) Oral daily  primidone 50 milliGRAM(s) Oral two times a day  propranolol 20 milliGRAM(s) Oral every 6 hours  scopolamine   Patch 1 Patch Transdermal every 72 hours  scopolamine   Patch 1 Patch Transdermal every 72 hours  tobramycin for Nebulization 300 milliGRAM(s) Inhalation every 12 hours  vancomycin  IVPB 1250 milliGRAM(s) IV Intermittent <User Schedule>      ----------------------------------------------------------------------------------------  PHYSICAL EXAM  Constitutional - NAD, eyes closed; no eye opening to name call or chest rub; no command following  +Grimace to pain  HEENT - NCAT, does not open eyes  Abdomen - BS+, Soft, ND  Extremities - b/l UE flexion posturing with rigidity, attempts to withdraw to pain LUE  Neurologic Exam -                    Cognitive - eyes remain closed     Communication - aphasic     Motor - no command following        Sensory - unreliable   ----------------------------------------------------------------------------------------  ASSESSMENT/PLAN    83 yo Male with functional deficits after an acute left basal ganglia hemorrhage    Left BG hemorrhage - stable on CT 10/7  HAP/Fevers - Cefepime, Vanco, Albuterol, scopolamine for secretions  Arousal - Bromocriptine 5mg BID 6AM/12PM (10/4), Amantadine 100mg BID (DC 10/4), Provigil 100mg daily (10/4)  Sleep - Melatonin 3mg (10/1)  Hypothyroid - Synthroid  HTN - Propranolol  Urinary Retention - Cardura  Tremor? - Primidone  Pain - Tylenol  Diet - NPO, +NGT   DVT PPX - SCDs, Lovenox  Rehab - Patient medically/surgically being optimized and in guarded state. Patient is now DNR/DNI. Patient with poor prognosis for functional improvements. Patient noted to have b/l UE posturing. Family to consider trach/peg. Palliative Care following patient.    Recommend mobilization in bed at this time to prevent contractures and decubitiu.  Keep extremities elevated on pillows to assist with edema and positioning.  Frequent q2 turning of patient to prevent skin breakdown  OT to evaluate patient for b/l hand splints and b/l PRAFO to prevent contractures, assist with positioning, and prevent skin breakdown     Will sign off for now. Please re-consult if clinical/functional status changes

## 2019-10-10 LAB — GLUCOSE BLDC GLUCOMTR-MCNC: 114 MG/DL — HIGH (ref 70–99)

## 2019-10-10 PROCEDURE — 99233 SBSQ HOSP IP/OBS HIGH 50: CPT

## 2019-10-10 PROCEDURE — 99232 SBSQ HOSP IP/OBS MODERATE 35: CPT

## 2019-10-10 RX ORDER — ACETAMINOPHEN 500 MG
650 TABLET ORAL EVERY 6 HOURS
Refills: 0 | Status: DISCONTINUED | OUTPATIENT
Start: 2019-10-10 | End: 2019-10-14

## 2019-10-10 RX ADMIN — SCOPALAMINE 1 PATCH: 1 PATCH, EXTENDED RELEASE TRANSDERMAL at 07:33

## 2019-10-10 RX ADMIN — SCOPALAMINE 1 PATCH: 1 PATCH, EXTENDED RELEASE TRANSDERMAL at 10:34

## 2019-10-10 RX ADMIN — BROMOCRIPTINE MESYLATE 5 MILLIGRAM(S): 5 CAPSULE ORAL at 11:13

## 2019-10-10 RX ADMIN — MODAFINIL 100 MILLIGRAM(S): 200 TABLET ORAL at 11:13

## 2019-10-10 RX ADMIN — SCOPALAMINE 1 PATCH: 1 PATCH, EXTENDED RELEASE TRANSDERMAL at 10:33

## 2019-10-10 RX ADMIN — BROMOCRIPTINE MESYLATE 5 MILLIGRAM(S): 5 CAPSULE ORAL at 05:42

## 2019-10-10 RX ADMIN — Medication 650 MILLIGRAM(S): at 17:39

## 2019-10-10 RX ADMIN — ALBUTEROL 2.5 MILLIGRAM(S): 90 AEROSOL, METERED ORAL at 21:20

## 2019-10-10 RX ADMIN — SCOPALAMINE 1 PATCH: 1 PATCH, EXTENDED RELEASE TRANSDERMAL at 18:16

## 2019-10-10 RX ADMIN — ALBUTEROL 2.5 MILLIGRAM(S): 90 AEROSOL, METERED ORAL at 15:50

## 2019-10-10 RX ADMIN — ALBUTEROL 2.5 MILLIGRAM(S): 90 AEROSOL, METERED ORAL at 04:22

## 2019-10-10 RX ADMIN — Medication 3 MILLIGRAM(S): at 22:32

## 2019-10-10 RX ADMIN — SCOPALAMINE 1 PATCH: 1 PATCH, EXTENDED RELEASE TRANSDERMAL at 07:34

## 2019-10-10 RX ADMIN — ENOXAPARIN SODIUM 40 MILLIGRAM(S): 100 INJECTION SUBCUTANEOUS at 11:13

## 2019-10-10 RX ADMIN — PRIMIDONE 50 MILLIGRAM(S): 250 TABLET ORAL at 05:43

## 2019-10-10 RX ADMIN — CHLORHEXIDINE GLUCONATE 1 APPLICATION(S): 213 SOLUTION TOPICAL at 11:13

## 2019-10-10 RX ADMIN — Medication 25 MICROGRAM(S): at 22:33

## 2019-10-10 RX ADMIN — PANTOPRAZOLE SODIUM 40 MILLIGRAM(S): 20 TABLET, DELAYED RELEASE ORAL at 11:12

## 2019-10-10 RX ADMIN — Medication 650 MILLIGRAM(S): at 18:15

## 2019-10-10 RX ADMIN — ALBUTEROL 2.5 MILLIGRAM(S): 90 AEROSOL, METERED ORAL at 08:31

## 2019-10-10 RX ADMIN — PRIMIDONE 50 MILLIGRAM(S): 250 TABLET ORAL at 17:23

## 2019-10-10 RX ADMIN — Medication 1 MILLIGRAM(S): at 22:33

## 2019-10-10 NOTE — PROGRESS NOTE ADULT - ASSESSMENT
84y Male who is followed by neurology because of ICH    ICH  Likely hypertensive etiology  Avoid anti platelet medications.  Avoid anti coagulant medications.  Off Keppra  Repeat head CT 10/7 grossly stable basal ganglia ICH and intraventricular hemorrhage, no hydrocephalus.  Now DNR, DNI    Hypertension   Control blood pressure.     Exam continues to worsen, I spoke to daughter.  they want DNR/DNI, no trach or feeding tube.  Not ready at this time though to make full comfort care.  There is small hope for a meaningful recovery.    will follow with you    Oscar Malave MD PhD   [id number]

## 2019-10-10 NOTE — PROGRESS NOTE ADULT - SUBJECTIVE AND OBJECTIVE BOX
HANNAH HERNANDEZ Male 84y [id number]    Patient is a 84y old  Male who presents with a chief complaint of ICH (10 Oct 2019 14:06)      Subjective/objective:  Pt seen and examined at bedside, no over night event reported by night staff. Pt reports doing well and has no new complaints.    Review of system:  No fever, chills, nausea, vomiting, headache, dizziness, chest pain, SOB or palpitation.      PHYSICAL EXAM:    Vital Signs Last 24 Hrs  T(C): 37.6 (10 Oct 2019 11:52), Max: 38.1 (09 Oct 2019 21:25)  T(F): 99.7 (10 Oct 2019 11:52), Max: 100.6 (09 Oct 2019 21:25)  HR: 70 (10 Oct 2019 08:44) (62 - 70)  BP: 135/76 (10 Oct 2019 08:00) (107/41 - 145/58)  BP(mean): 92 (10 Oct 2019 08:00) (58 - 92)  RR: 15 (10 Oct 2019 08:00) (15 - 20)  SpO2: 97% (10 Oct 2019 08:44) (94% - 99%)    GENERAL: Pt lying comfortably, NAD.  ENMT: PERRL, +EOMI.  NECK: soft, Supple, No JVD,   CHEST/LUNG: Clear to auscultatation bilaterally; No wheezing.  HEART: S1S2+, Regular rate and rhythm; No murmurs.  ABDOMEN: Soft, Nontender, Nondistended; Bowel sounds present.  MUSCULOSKELETAL: Normal range of motion.  SKIN: No rashes or lesions.  NEURO: AAOX3, no focal deficits, no motor r sensory loss.  PSYCH: normal mood.          MEDICATIONS  (STANDING):  ALBUTerol    0.083% 2.5 milliGRAM(s) Nebulizer every 6 hours  bromocriptine Tablet 5 milliGRAM(s) Oral <User Schedule>  chlorhexidine 2% Cloths 1 Application(s) Topical daily  doxazosin 1 milliGRAM(s) Oral at bedtime  enoxaparin Injectable 40 milliGRAM(s) SubCutaneous daily  levothyroxine Injectable 25 MICROGram(s) IV Push at bedtime  melatonin 3 milliGRAM(s) Oral at bedtime  modafinil 100 milliGRAM(s) Oral daily  pantoprazole  Injectable 40 milliGRAM(s) IV Push daily  primidone 50 milliGRAM(s) Oral two times a day  propranolol 20 milliGRAM(s) Oral every 6 hours  scopolamine   Patch 1 Patch Transdermal every 72 hours  scopolamine   Patch 1 Patch Transdermal every 72 hours    MEDICATIONS  (PRN):  acetaminophen    Suspension .. 650 milliGRAM(s) Oral every 6 hours PRN Temp greater or equal to 38C (100.4F)        Labs:  LABS:    10-09    134<L>  |  96<L>  |  22.0<H>  ----------------------------<  134<H>  4.9   |  27.0  |  0.70    Ca    8.5<L>      09 Oct 2019 05:57  Phos  3.3     10-09  Mg     2.3     10-09    TPro  6.0<L>  /  Alb  3.0<L>  /  TBili  0.3<L>  /  DBili  x   /  AST  48<H>  /  ALT  79<H>  /  AlkPhos  60  10-09        LIVER FUNCTIONS - ( 09 Oct 2019 05:57 )  Alb: 3.0 g/dL / Pro: 6.0 g/dL / ALK PHOS: 60 U/L / ALT: 79 U/L / AST: 48 U/L / GGT: x HANNAH HERNANDEZ Male 84y [id number]    Patient is a 84y old  Male who presents with a chief complaint of ICH (10 Oct 2019 14:06)      Subjective/objective:  Pt seen and examined at bedside, no over night event reported by night staff. No appreciable change in patient's clinical status.     Review of system:  No fever, chills, nausea, vomiting, headache, dizziness, chest pain, SOB or palpitation.      PHYSICAL EXAM:    Vital Signs Last 24 Hrs  T(C): 37.6 (10 Oct 2019 11:52), Max: 38.1 (09 Oct 2019 21:25)  T(F): 99.7 (10 Oct 2019 11:52), Max: 100.6 (09 Oct 2019 21:25)  HR: 70 (10 Oct 2019 08:44) (62 - 70)  BP: 135/76 (10 Oct 2019 08:00) (107/41 - 145/58)  BP(mean): 92 (10 Oct 2019 08:00) (58 - 92)  RR: 15 (10 Oct 2019 08:00) (15 - 20)  SpO2: 97% (10 Oct 2019 08:44) (94% - 99%)        CONSTITUTIONAL: Obtunded and ill appearing,  NAD  ENMT: NGT in place   EYES: eyes closed   CARDIAC: Normal rate, regular rhythm.  Heart sounds S1, S2.  No murmurs, rubs or gallops   RESPIRATORY: Breath sounds clear and equal bilaterally. No wheezes, rales or rhonchi  GASTROENTEROLOGY: NT/ND, + bowel sounds   EXTREMITIES: edematous   NEUROLOGICAL: Non verbal, obtubded, unable to follow commands          MEDICATIONS  (STANDING):  ALBUTerol    0.083% 2.5 milliGRAM(s) Nebulizer every 6 hours  bromocriptine Tablet 5 milliGRAM(s) Oral <User Schedule>  chlorhexidine 2% Cloths 1 Application(s) Topical daily  doxazosin 1 milliGRAM(s) Oral at bedtime  enoxaparin Injectable 40 milliGRAM(s) SubCutaneous daily  levothyroxine Injectable 25 MICROGram(s) IV Push at bedtime  melatonin 3 milliGRAM(s) Oral at bedtime  modafinil 100 milliGRAM(s) Oral daily  pantoprazole  Injectable 40 milliGRAM(s) IV Push daily  primidone 50 milliGRAM(s) Oral two times a day  propranolol 20 milliGRAM(s) Oral every 6 hours  scopolamine   Patch 1 Patch Transdermal every 72 hours  scopolamine   Patch 1 Patch Transdermal every 72 hours    MEDICATIONS  (PRN):  acetaminophen    Suspension .. 650 milliGRAM(s) Oral every 6 hours PRN Temp greater or equal to 38C (100.4F)        Labs:  LABS:    10-09    134<L>  |  96<L>  |  22.0<H>  ----------------------------<  134<H>  4.9   |  27.0  |  0.70    Ca    8.5<L>      09 Oct 2019 05:57  Phos  3.3     10-09  Mg     2.3     10-09    TPro  6.0<L>  /  Alb  3.0<L>  /  TBili  0.3<L>  /  DBili  x   /  AST  48<H>  /  ALT  79<H>  /  AlkPhos  60  10-09        LIVER FUNCTIONS - ( 09 Oct 2019 05:57 )  Alb: 3.0 g/dL / Pro: 6.0 g/dL / ALK PHOS: 60 U/L / ALT: 79 U/L / AST: 48 U/L / GGT: x             CT brain 10/7/19    IMPRESSION: Stable left basal ganglia hemorrhage. Stable   moderate intraventricular hemorrhage. Stable ventricular size. No new   hemorrhage. No hydrocephalus.

## 2019-10-10 NOTE — PROGRESS NOTE ADULT - ASSESSMENT
83 y/o M with hx of HTN, BPH and hypothyroid transferred to [facility] from [facility] [place] [facility] for intracranial hemorrhage management and neurosurgical eval. Pt was intubated for transfer. Noted at [facility] with acute large left basal ganglia hemorrhage with intraventricular extension. Upon arrival had emergent EVD placement which was done at the bedside in the ICU. ICH likely secondary to htn emergency, pt was placed on bp control. Also noted with acute respiratory failure with hypoxia, excess secretions and suspected HCAPNA empirically remained on vanco/ cefepime and inhaled tobramycin which was discontinued by ID today.  Pt was able to be extubated on 10/1. Remains with excess secretions and with low grade fevers tmax: 100.8 unclear if related to current infection/ central fevers. S/p R EVD removal 10/4. Radiographically stable on repeat CTh with clearing IVH. Neuro sx/ neuro continued to follow the pt. Pt continues to be unresponsive, minimally responding to commands, remains with NGT in place for feeding, unable to clear his own secretions at times. Continues on scopolamine and suctioning frequently. Overall guarded prognosis, palliative care team continues to follow the patient. Family has agreed to DNR/DNI but to continue with current care. Ongoing discussions with the family in regards to the tx plan, pt would benefit from hospice intervention. Palliative Care team is following patient.     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  -s/p placement and removal of EVD  - stable repeat ct head   - As per neuro sx no need for repeat ct head unless further decline or change in mental status  - c/w neurochecks   - no evidence of meaningful recovery  - remains with dysphagia/ unresponsiveness  - NGT to remain in place for now for medications/ feeding   - pt unable to clear secretions  - c/w scopolamine and frequent suctioning   - montejo to remain in place   - neuro f/u noted and appreciated   - neuro sx f/u noted and appreciated   - palliative care f/u noted and appreciated- daughter Maral stating that the family will not make a decision for Comfort Care until the grandson's wedding 10/20/19.    Fever unclear etiology  - s/p abx tx for HCAPNA/ aspiration PNA, clinical concern that low grade fevers are central   - afebrile   - tmax: 100.3   - leukocytosis   - d/c all abx per ID  -ID consult noted and appreciated 	    Seizure   - c/w primidone ngt qhs     Hypothyroid  - tsh wnl   - c/w synthroid 25mcg IV QHS      DVT ppx  - c/w lovenox sq qd   - scd boots b/l    Advanced Directive: DNR DNI   Next of kin: Wife, son, daughters   - Wife and son updated at bedside as stated above in regards to plan of care.   Dispo: Guarded prognosis, would benefit from hospice intervention. Ongoing discussions with the family, hospitalist team and palliative care team, in regards to the plan of care. Family wants to wait until grandson's wedding 10/20/19 to make any decisions regarding Comfort Care. Pt has not made any clinical improvement and family has been informed that pt is at high risk of aspiration w ngt feedings as patient has demonstrated inability to clear secretions. 85 y/o M with hx of HTN, BPH and hypothyroid transferred to [facility] from [facility] for intracranial hemorrhage management and neurosurgical eval. Pt was intubated for transfer. Noted at [facility] with acute large left basal ganglia hemorrhage with intraventricular extension. Upon arrival had emergent EVD placement which was done at the bedside in the ICU. ICH likely secondary to htn emergency, pt was placed on bp control. Also noted with acute respiratory failure with hypoxia, excess secretions and suspected HCAPNA empirically remained on vanco/ cefepime and inhaled tobramycin which was discontinued by ID today.  Pt was able to be extubated on 10/1. Remains with excess secretions and with low grade fevers tmax: 100.8 unclear if related to current infection/ central fevers. S/p R EVD removal 10/4. Radiographically stable on repeat CTh with clearing IVH. Neuro sx/ neuro continued to follow the pt. Pt continues to be unresponsive, minimally responding to commands, remains with NGT in place for feeding, unable to clear his own secretions at times. Continues on scopolamine and suctioning frequently. Overall guarded prognosis, palliative care team continues to follow the patient. Family has agreed to DNR/DNI but to continue with current care. Ongoing discussions with the family in regards to the tx plan, pt would benefit from hospice intervention, family has the patient's grandson wedding on 10/20/19 and they do not want to make any decisions until then. Palliative Care team is following patient.     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  -s/p placement and removal of EVD  - remains obtunded with no meaningful recovery noted   - stable repeat ct head   - As per neuro sx no need for repeat ct head unless further decline or change in mental status  - c/w neurochecks per routine   - remains with dysphagia/ unresponsiveness  - NGT to remain in place for now for medications/ feeding   - pt unable to clear secretions  - c/w scopolamine and frequent suctioning   - Noted pulmonary toilet  - will repeat cxr and see if fluid overloaded- possible lasix prn if needed   - montejo to remain in place   - frequent repositioning   - Chest PT  - neuro f/u noted and appreciated   - neuro sx f/u noted and appreciated   - palliative care f/u noted and appreciated- daughter Maral stating that the family will not make a decision for Comfort Care until the grandson's wedding 10/20/19. Extensive ongoing discussions with the patients wife and family, discussing patients quality of life and wishes. Which wife continues to state that the pt would not have wanted to live like this. Emotional support continues to be provided     Fever unclear etiology  - s/p abx tx for HCAPNA/ aspiration PNA, clinical concern that low grade fevers are central   - afebrile   - tmax: 100.3   - leukocytosis likely reactive    - d/c all abx per ID on 10/8   -ID consult noted and appreciated 	    Seizure   - c/w primidone ngt qhs     Hypothyroid  - tsh wnl   - c/w synthroid 25mcg IV QHS      DVT ppx  - c/w lovenox sq qd   - scd boots b/l      Activity level: Bedbound     Advanced Directive: DNR DNI   Next of kin: Wife, son, daughters   Wife and daughter updated at bedside. Daughter adamant that no decision be made until after her sons wedding. D/w her and patients wife that although the wedding is important to the family, it is far more important to honor and respect their fathers wishes. They understand this but are having a hard time coming to terms with everything especially bc the wedding is on 10/20. Daughter did request hospice information, more so for bereavement support for her mother. Will d/w palliative team in regards to this.   Advanced care discussion took 33 minutes     Dispo: Guarded prognosis, would benefit from hospice intervention. Ongoing discussions with the family, hospitalist team and palliative care team, in regards to the plan of care. Family wants to wait until grandson's wedding 10/20/19 to make any decisions regarding Comfort Care. Pt has not made any clinical improvement and family has been informed that pt is at high risk of aspiration w ngt feedings as patient has demonstrated inability to clear secretions.

## 2019-10-10 NOTE — PROGRESS NOTE ADULT - ASSESSMENT
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 Mental status remains poor, issues of secretions  causing aspiration and respiratory compromise. High risk for decline

## 2019-10-10 NOTE — PROGRESS NOTE ADULT - SUBJECTIVE AND OBJECTIVE BOX
CC: follow up GOC  INTERVAL HPI/OVERNIGHT EVENTS:    PRESENT SYMPTOMS: SOURCE:  Patient/Family/Team    PAIN SCALE:  0 = none  1 = mild   2 = moderate  3 = severe    Pain: no outward signs    Dyspnea:  [ ] YES [x ] NO  Anxiety:  [ ] YES [x ] NO  Fatigue: [x ] YES [ ] NO  Nausea: [ ] YES [ ] NO  na    Loss of Appetite: [ x] YES [ ] NO NPO  Other symptoms: __________    MEDICATIONS  (STANDING):  ALBUTerol    0.083% 2.5 milliGRAM(s) Nebulizer every 6 hours  bromocriptine Tablet 5 milliGRAM(s) Oral <User Schedule>  chlorhexidine 2% Cloths 1 Application(s) Topical daily  doxazosin 1 milliGRAM(s) Oral at bedtime  enoxaparin Injectable 40 milliGRAM(s) SubCutaneous daily  levothyroxine Injectable 25 MICROGram(s) IV Push at bedtime  melatonin 3 milliGRAM(s) Oral at bedtime  modafinil 100 milliGRAM(s) Oral daily  pantoprazole  Injectable 40 milliGRAM(s) IV Push daily  primidone 50 milliGRAM(s) Oral two times a day  propranolol 20 milliGRAM(s) Oral every 6 hours  scopolamine   Patch 1 Patch Transdermal every 72 hours  scopolamine   Patch 1 Patch Transdermal every 72 hours    MEDICATIONS  (PRN):  acetaminophen    Suspension .. 650 milliGRAM(s) Oral every 6 hours PRN Temp greater or equal to 38C (100.4F)      Allergies    No Known Allergies    Intolerances    Karnofsky Performance Score/Palliative Performance Status Version 2:  10   %    Vital Signs Last 24 Hrs  T(C): 37.6 (10 Oct 2019 11:52), Max: 38.1 (09 Oct 2019 21:25)  T(F): 99.7 (10 Oct 2019 11:52), Max: 100.6 (09 Oct 2019 21:25)  HR: 70 (10 Oct 2019 08:44) (62 - 70)  BP: 135/76 (10 Oct 2019 08:00) (107/41 - 145/58)  BP(mean): 92 (10 Oct 2019 08:00) (58 - 92)  RR: 15 (10 Oct 2019 08:00) (15 - 20)  SpO2: 97% (10 Oct 2019 08:44) (94% - 99%)    PHYSICAL EXAM:    General: Non responsive to verbal stimuli  HEENT: [x ] normal  [ ] dry mouth  [ ] ET tube/trach    Lungs: [ x] comfortable [ ] tachypnea/labored breathing  [ ] excessive secretions    CV: [ x] normal  [ ] tachycardia    GI: [ x] normal  + NGtube  : [ ] normal  [x ] incontinent  [ ] oliguria/anuria  [ ] montejo    MSK: [ ] normal  [x ] weakness  [ ] edema             [ ] ambulatory  [ x] bedbound/wheelchair bound    Skin: [ ] normal  [ ] pressure ulcers- Stage_____  [x ] no rash    LABS:    10-09    134<L>  |  96<L>  |  22.0<H>  ----------------------------<  134<H>  4.9   |  27.0  |  0.70    Ca    8.5<L>      09 Oct 2019 05:57  Phos  3.3     10-09  Mg     2.3     10-09    TPro  6.0<L>  /  Alb  3.0<L>  /  TBili  0.3<L>  /  DBili  x   /  AST  48<H>  /  ALT  79<H>  /  AlkPhos  60  10-09        I&O's Summary    09 Oct 2019 07:01  -  10 Oct 2019 07:00  --------------------------------------------------------  IN: 2440 mL / OUT: 4300 mL / NET: -1860 mL    10 Oct 2019 07:01  -  10 Oct 2019 14:03  --------------------------------------------------------  IN: 0 mL / OUT: 550 mL / NET: -550 mL      Thank you for the opportunity to assist with the care of this patient.   [facility] [phone].

## 2019-10-10 NOTE — PROGRESS NOTE ADULT - PROBLEM SELECTOR PLAN 4
Spoke to nurse Becky- family does not want to make decision for comfort measures until after grandsons's wedding on 10/20.    Spoke to daughter Maral - see [facility] note.  Maral states mother not ready to make the decision for comfort. They all understand overall poor prognosis. However, there is a wedding on 10/20 and do not want to make any decisions for comfort before then.   Informed Maral high risk for decline from aspiration with respiratory compromise.  She understands and if that should happen then it would be " in God's hands" but they don't want to make decision ( for comfort) prior to that.  Other daughter Ghazal who I spoke to yesterday had stated they are all aware this is towards end of life and once the wedding is done, she will allow her son to go on his honeymoon and not inform him of grandfather's passing.  Cont support. Spoke to nurse Becky- family does not want to make decision for comfort measures until after grandsons's wedding on 10/20.    Spoke to daughter Maral - see [place] [facility] note.  Maral states mother not ready to make the decision for comfort. They all understand overall poor prognosis. However, there is a wedding on 10/20 and do not want to make any decisions for comfort before then.   Informed Maral high risk for decline from aspiration with respiratory compromise.  She understands and if that should happen then it would be " in God's hands" but they don't want to make decision ( for comfort) prior to that.  Cont support.

## 2019-10-10 NOTE — PROGRESS NOTE ADULT - SUBJECTIVE AND OBJECTIVE BOX
[facility]                                        Neurology at [place]                                 Frieda Saunders, & Jose                                  [address]                                        [place], [place], [address]                                             [phone]        CC: intracranial hemorrhage     HPI:   The patient is a 84y Male who presented as a transfer to [facility] for management of ICH.  He apparently may have had seizure and right sided weakness and was brought to [facility].  The patient was transferred to [facility] for intracranial hemorrhage management and neurosurgical eval.  He was intubated for transfer.  An extraventricular drain was placed. This was ultimately removed.    Interim history: transferred to [address] [place] [address], lethargic, not following commands    ROS:  Unobtainable due to patient's condition.       MEDICATIONS  (STANDING):  ALBUTerol    0.083% 2.5 milliGRAM(s) Nebulizer every 6 hours  bromocriptine Tablet 5 milliGRAM(s) Oral <User Schedule>  chlorhexidine 2% Cloths 1 Application(s) Topical daily  doxazosin 1 milliGRAM(s) Oral at bedtime  enoxaparin Injectable 40 milliGRAM(s) SubCutaneous daily  levothyroxine Injectable 25 MICROGram(s) IV Push at bedtime  melatonin 3 milliGRAM(s) Oral at bedtime  modafinil 100 milliGRAM(s) Oral daily  pantoprazole  Injectable 40 milliGRAM(s) IV Push daily  primidone 50 milliGRAM(s) Oral two times a day  propranolol 20 milliGRAM(s) Oral every 6 hours  scopolamine   Patch 1 Patch Transdermal every 72 hours  scopolamine   Patch 1 Patch Transdermal every 72 hours    MEDICATIONS  (PRN):  acetaminophen    Suspension .. 650 milliGRAM(s) Oral every 6 hours PRN Temp greater or equal to 38C (100.4F)      Vital Signs Last 24 Hrs  T(C): 37.6 (10 Oct 2019 11:52), Max: 38.1 (09 Oct 2019 21:25)  T(F): 99.7 (10 Oct 2019 11:52), Max: 100.6 (09 Oct 2019 21:25)  HR: 70 (10 Oct 2019 08:44) (62 - 70)  BP: 135/76 (10 Oct 2019 08:00) (107/41 - 145/58)  BP(mean): 92 (10 Oct 2019 08:00) (58 - 92)  RR: 15 (10 Oct 2019 08:00) (15 - 20)  SpO2: 97% (10 Oct 2019 08:44) (94% - 99%)    Detailed Neurologic Exam:    Mental status: The patient is sleepy no longer arouses to voice. He does not instructions. Unable to assess orientation or language    Cranial nerves: Pupils unable to assess, forcefully keeps eyelids shut. Extraocular motion is difficult to assess as he does not open eyes/keep eyes open. Facial musculature is asymmetric with central right weakness. Palate and tongue are difficult to evaluate.     Motor: There is normal bulk and tone.  There is no tremor.  Moving right minimally (2-3/5) to stimuli.  Moving left 5/5 to stimuli.     Sensory: grimace to pinch x 4 ext    Reflexes: diminished throughout and plantar responses are flexor left, extensor right.    Cerebellar: Cannot be assessed.     Labs:           10-09    134<L>  |  96<L>  |  22.0<H>  ----------------------------<  134<H>  4.9   |  27.0  |  0.70    Ca    8.5<L>      09 Oct 2019 05:57  Phos  3.3     10-09  Mg     2.3     10-09    TPro  6.0<L>  /  Alb  3.0<L>  /  TBili  0.3<L>  /  DBili  x   /  AST  48<H>  /  ALT  79<H>  /  AlkPhos  60  10-09    LIVER FUNCTIONS - ( 09 Oct 2019 05:57 )  Alb: 3.0 g/dL / Pro: 6.0 g/dL / ALK PHOS: 60 U/L / ALT: 79 U/L / AST: 48 U/L / GGT: x

## 2019-10-11 LAB
ANION GAP SERPL CALC-SCNC: 12 MMOL/L — SIGNIFICANT CHANGE UP (ref 5–17)
ANION GAP SERPL CALC-SCNC: 14 MMOL/L — SIGNIFICANT CHANGE UP (ref 5–17)
ANION GAP SERPL CALC-SCNC: 14 MMOL/L — SIGNIFICANT CHANGE UP (ref 5–17)
APPEARANCE UR: CLEAR — SIGNIFICANT CHANGE UP
BASOPHILS # BLD AUTO: 0.08 K/UL — SIGNIFICANT CHANGE UP (ref 0–0.2)
BASOPHILS # BLD AUTO: 0.09 K/UL — SIGNIFICANT CHANGE UP (ref 0–0.2)
BASOPHILS NFR BLD AUTO: 0.4 % — SIGNIFICANT CHANGE UP (ref 0–2)
BASOPHILS NFR BLD AUTO: 0.8 % — SIGNIFICANT CHANGE UP (ref 0–2)
BILIRUB UR-MCNC: NEGATIVE — SIGNIFICANT CHANGE UP
BUN SERPL-MCNC: 26 MG/DL — HIGH (ref 8–20)
BUN SERPL-MCNC: 26 MG/DL — HIGH (ref 8–20)
BUN SERPL-MCNC: 30 MG/DL — HIGH (ref 8–20)
CALCIUM SERPL-MCNC: 8.8 MG/DL — SIGNIFICANT CHANGE UP (ref 8.6–10.2)
CALCIUM SERPL-MCNC: 8.8 MG/DL — SIGNIFICANT CHANGE UP (ref 8.6–10.2)
CALCIUM SERPL-MCNC: 9 MG/DL — SIGNIFICANT CHANGE UP (ref 8.6–10.2)
CHLORIDE SERPL-SCNC: 91 MMOL/L — LOW (ref 98–107)
CHLORIDE SERPL-SCNC: 92 MMOL/L — LOW (ref 98–107)
CHLORIDE SERPL-SCNC: 94 MMOL/L — LOW (ref 98–107)
CO2 SERPL-SCNC: 23 MMOL/L — SIGNIFICANT CHANGE UP (ref 22–29)
CO2 SERPL-SCNC: 23 MMOL/L — SIGNIFICANT CHANGE UP (ref 22–29)
CO2 SERPL-SCNC: 24 MMOL/L — SIGNIFICANT CHANGE UP (ref 22–29)
COLOR SPEC: YELLOW — SIGNIFICANT CHANGE UP
CREAT SERPL-MCNC: 0.74 MG/DL — SIGNIFICANT CHANGE UP (ref 0.5–1.3)
CREAT SERPL-MCNC: 0.78 MG/DL — SIGNIFICANT CHANGE UP (ref 0.5–1.3)
CREAT SERPL-MCNC: 0.97 MG/DL — SIGNIFICANT CHANGE UP (ref 0.5–1.3)
DIFF PNL FLD: ABNORMAL
EOSINOPHIL # BLD AUTO: 0.1 K/UL — SIGNIFICANT CHANGE UP (ref 0–0.5)
EOSINOPHIL # BLD AUTO: 0.16 K/UL — SIGNIFICANT CHANGE UP (ref 0–0.5)
EOSINOPHIL NFR BLD AUTO: 0.5 % — SIGNIFICANT CHANGE UP (ref 0–6)
EOSINOPHIL NFR BLD AUTO: 1.6 % — SIGNIFICANT CHANGE UP (ref 0–6)
GLUCOSE BLDC GLUCOMTR-MCNC: 117 MG/DL — HIGH (ref 70–99)
GLUCOSE BLDC GLUCOMTR-MCNC: 129 MG/DL — HIGH (ref 70–99)
GLUCOSE SERPL-MCNC: 131 MG/DL — HIGH (ref 70–115)
GLUCOSE SERPL-MCNC: 138 MG/DL — HIGH (ref 70–115)
GLUCOSE SERPL-MCNC: 142 MG/DL — HIGH (ref 70–115)
GLUCOSE UR QL: NEGATIVE MG/DL — SIGNIFICANT CHANGE UP
HCT VFR BLD CALC: 36.6 % — LOW (ref 39–50)
HCT VFR BLD CALC: 38.8 % — LOW (ref 39–50)
HGB BLD-MCNC: 12 G/DL — LOW (ref 13–17)
HGB BLD-MCNC: 12.9 G/DL — LOW (ref 13–17)
IMM GRANULOCYTES NFR BLD AUTO: 1.2 % — SIGNIFICANT CHANGE UP (ref 0–1.5)
IMM GRANULOCYTES NFR BLD AUTO: 2.7 % — HIGH (ref 0–1.5)
INR BLD: 1.09 RATIO — SIGNIFICANT CHANGE UP (ref 0.88–1.16)
KETONES UR-MCNC: NEGATIVE — SIGNIFICANT CHANGE UP
LACTATE SERPL-SCNC: 2 MMOL/L — SIGNIFICANT CHANGE UP (ref 0.5–2)
LEUKOCYTE ESTERASE UR-ACNC: NEGATIVE — SIGNIFICANT CHANGE UP
LYMPHOCYTES # BLD AUTO: 0.7 K/UL — LOW (ref 1–3.3)
LYMPHOCYTES # BLD AUTO: 1.5 K/UL — SIGNIFICANT CHANGE UP (ref 1–3.3)
LYMPHOCYTES # BLD AUTO: 14.9 % — SIGNIFICANT CHANGE UP (ref 13–44)
LYMPHOCYTES # BLD AUTO: 3.5 % — LOW (ref 13–44)
MCHC RBC-ENTMCNC: 31.3 PG — SIGNIFICANT CHANGE UP (ref 27–34)
MCHC RBC-ENTMCNC: 31.6 PG — SIGNIFICANT CHANGE UP (ref 27–34)
MCHC RBC-ENTMCNC: 32.8 GM/DL — SIGNIFICANT CHANGE UP (ref 32–36)
MCHC RBC-ENTMCNC: 33.2 GM/DL — SIGNIFICANT CHANGE UP (ref 32–36)
MCV RBC AUTO: 95.1 FL — SIGNIFICANT CHANGE UP (ref 80–100)
MCV RBC AUTO: 95.6 FL — SIGNIFICANT CHANGE UP (ref 80–100)
MONOCYTES # BLD AUTO: 0.82 K/UL — SIGNIFICANT CHANGE UP (ref 0–0.9)
MONOCYTES # BLD AUTO: 1.06 K/UL — HIGH (ref 0–0.9)
MONOCYTES NFR BLD AUTO: 5.3 % — SIGNIFICANT CHANGE UP (ref 2–14)
MONOCYTES NFR BLD AUTO: 8.1 % — SIGNIFICANT CHANGE UP (ref 2–14)
NEUTROPHILS # BLD AUTO: 17.86 K/UL — HIGH (ref 1.8–7.4)
NEUTROPHILS # BLD AUTO: 7.27 K/UL — SIGNIFICANT CHANGE UP (ref 1.8–7.4)
NEUTROPHILS NFR BLD AUTO: 71.9 % — SIGNIFICANT CHANGE UP (ref 43–77)
NEUTROPHILS NFR BLD AUTO: 89.1 % — HIGH (ref 43–77)
NITRITE UR-MCNC: NEGATIVE — SIGNIFICANT CHANGE UP
PH UR: 8 — SIGNIFICANT CHANGE UP (ref 5–8)
PLATELET # BLD AUTO: 300 K/UL — SIGNIFICANT CHANGE UP (ref 150–400)
PLATELET # BLD AUTO: 369 K/UL — SIGNIFICANT CHANGE UP (ref 150–400)
POTASSIUM SERPL-MCNC: 5.2 MMOL/L — SIGNIFICANT CHANGE UP (ref 3.5–5.3)
POTASSIUM SERPL-MCNC: 5.4 MMOL/L — HIGH (ref 3.5–5.3)
POTASSIUM SERPL-MCNC: 6.7 MMOL/L — CRITICAL HIGH (ref 3.5–5.3)
POTASSIUM SERPL-SCNC: 5.2 MMOL/L — SIGNIFICANT CHANGE UP (ref 3.5–5.3)
POTASSIUM SERPL-SCNC: 5.4 MMOL/L — HIGH (ref 3.5–5.3)
POTASSIUM SERPL-SCNC: 6.7 MMOL/L — CRITICAL HIGH (ref 3.5–5.3)
PROCALCITONIN SERPL-MCNC: 0.93 NG/ML — HIGH (ref 0.02–0.1)
PROT UR-MCNC: 30 MG/DL
PROTHROM AB SERPL-ACNC: 12.6 SEC — SIGNIFICANT CHANGE UP (ref 10–12.9)
RBC # BLD: 3.83 M/UL — LOW (ref 4.2–5.8)
RBC # BLD: 4.08 M/UL — LOW (ref 4.2–5.8)
RBC # FLD: 14.1 % — SIGNIFICANT CHANGE UP (ref 10.3–14.5)
RBC # FLD: 14.2 % — SIGNIFICANT CHANGE UP (ref 10.3–14.5)
RBC CASTS # UR COMP ASSIST: SIGNIFICANT CHANGE UP /HPF (ref 0–4)
SODIUM SERPL-SCNC: 126 MMOL/L — LOW (ref 135–145)
SODIUM SERPL-SCNC: 130 MMOL/L — LOW (ref 135–145)
SODIUM SERPL-SCNC: 131 MMOL/L — LOW (ref 135–145)
SP GR SPEC: 1.01 — SIGNIFICANT CHANGE UP (ref 1.01–1.02)
UROBILINOGEN FLD QL: 4 MG/DL
WBC # BLD: 10.1 K/UL — SIGNIFICANT CHANGE UP (ref 3.8–10.5)
WBC # BLD: 20.06 K/UL — HIGH (ref 3.8–10.5)
WBC # FLD AUTO: 10.1 K/UL — SIGNIFICANT CHANGE UP (ref 3.8–10.5)
WBC # FLD AUTO: 20.06 K/UL — HIGH (ref 3.8–10.5)
WBC UR QL: NEGATIVE — SIGNIFICANT CHANGE UP

## 2019-10-11 PROCEDURE — 99233 SBSQ HOSP IP/OBS HIGH 50: CPT

## 2019-10-11 PROCEDURE — 99232 SBSQ HOSP IP/OBS MODERATE 35: CPT

## 2019-10-11 PROCEDURE — 93010 ELECTROCARDIOGRAM REPORT: CPT

## 2019-10-11 PROCEDURE — 71045 X-RAY EXAM CHEST 1 VIEW: CPT | Mod: 26,77

## 2019-10-11 PROCEDURE — 71045 X-RAY EXAM CHEST 1 VIEW: CPT | Mod: 26

## 2019-10-11 RX ORDER — ACETAMINOPHEN 500 MG
650 TABLET ORAL ONCE
Refills: 0 | Status: COMPLETED | OUTPATIENT
Start: 2019-10-11 | End: 2019-10-11

## 2019-10-11 RX ORDER — PIPERACILLIN AND TAZOBACTAM 4; .5 G/20ML; G/20ML
3.38 INJECTION, POWDER, LYOPHILIZED, FOR SOLUTION INTRAVENOUS EVERY 8 HOURS
Refills: 0 | Status: DISCONTINUED | OUTPATIENT
Start: 2019-10-11 | End: 2019-10-14

## 2019-10-11 RX ORDER — CALCIUM CHLORIDE
500 POWDER (GRAM) MISCELLANEOUS ONCE
Refills: 0 | Status: COMPLETED | OUTPATIENT
Start: 2019-10-11 | End: 2019-10-12

## 2019-10-11 RX ORDER — SACCHAROMYCES BOULARDII 250 MG
250 POWDER IN PACKET (EA) ORAL
Refills: 0 | Status: DISCONTINUED | OUTPATIENT
Start: 2019-10-11 | End: 2019-10-14

## 2019-10-11 RX ORDER — PIPERACILLIN AND TAZOBACTAM 4; .5 G/20ML; G/20ML
3.38 INJECTION, POWDER, LYOPHILIZED, FOR SOLUTION INTRAVENOUS ONCE
Refills: 0 | Status: COMPLETED | OUTPATIENT
Start: 2019-10-11 | End: 2019-10-11

## 2019-10-11 RX ORDER — ALBUTEROL 90 UG/1
10 AEROSOL, METERED ORAL ONCE
Refills: 0 | Status: DISCONTINUED | OUTPATIENT
Start: 2019-10-11 | End: 2019-10-15

## 2019-10-11 RX ORDER — SODIUM POLYSTYRENE SULFONATE 4.1 MEQ/G
30 POWDER, FOR SUSPENSION ORAL ONCE
Refills: 0 | Status: COMPLETED | OUTPATIENT
Start: 2019-10-11 | End: 2019-10-12

## 2019-10-11 RX ADMIN — PIPERACILLIN AND TAZOBACTAM 200 GRAM(S): 4; .5 INJECTION, POWDER, LYOPHILIZED, FOR SOLUTION INTRAVENOUS at 18:12

## 2019-10-11 RX ADMIN — Medication 650 MILLIGRAM(S): at 05:11

## 2019-10-11 RX ADMIN — ALBUTEROL 2.5 MILLIGRAM(S): 90 AEROSOL, METERED ORAL at 20:25

## 2019-10-11 RX ADMIN — Medication 650 MILLIGRAM(S): at 16:49

## 2019-10-11 RX ADMIN — Medication 650 MILLIGRAM(S): at 17:53

## 2019-10-11 RX ADMIN — SCOPALAMINE 1 PATCH: 1 PATCH, EXTENDED RELEASE TRANSDERMAL at 07:18

## 2019-10-11 RX ADMIN — PANTOPRAZOLE SODIUM 40 MILLIGRAM(S): 20 TABLET, DELAYED RELEASE ORAL at 12:17

## 2019-10-11 RX ADMIN — ALBUTEROL 2.5 MILLIGRAM(S): 90 AEROSOL, METERED ORAL at 15:07

## 2019-10-11 RX ADMIN — BROMOCRIPTINE MESYLATE 5 MILLIGRAM(S): 5 CAPSULE ORAL at 05:11

## 2019-10-11 RX ADMIN — Medication 650 MILLIGRAM(S): at 05:41

## 2019-10-11 RX ADMIN — PIPERACILLIN AND TAZOBACTAM 25 GRAM(S): 4; .5 INJECTION, POWDER, LYOPHILIZED, FOR SOLUTION INTRAVENOUS at 22:33

## 2019-10-11 RX ADMIN — CHLORHEXIDINE GLUCONATE 1 APPLICATION(S): 213 SOLUTION TOPICAL at 12:16

## 2019-10-11 RX ADMIN — ALBUTEROL 2.5 MILLIGRAM(S): 90 AEROSOL, METERED ORAL at 04:21

## 2019-10-11 RX ADMIN — ENOXAPARIN SODIUM 40 MILLIGRAM(S): 100 INJECTION SUBCUTANEOUS at 12:16

## 2019-10-11 RX ADMIN — ALBUTEROL 2.5 MILLIGRAM(S): 90 AEROSOL, METERED ORAL at 08:03

## 2019-10-11 RX ADMIN — PRIMIDONE 50 MILLIGRAM(S): 250 TABLET ORAL at 05:11

## 2019-10-11 RX ADMIN — Medication 1 MILLIGRAM(S): at 22:33

## 2019-10-11 RX ADMIN — SCOPALAMINE 1 PATCH: 1 PATCH, EXTENDED RELEASE TRANSDERMAL at 16:49

## 2019-10-11 RX ADMIN — Medication 25 MICROGRAM(S): at 22:47

## 2019-10-11 NOTE — PROCEDURE NOTE - NSINDICATIONS_GEN_A_CORE
failure to thrive
post-void residual/bladder distention
failure to thrive
staple removal/EVD placed 9/27

## 2019-10-11 NOTE — PROGRESS NOTE ADULT - SUBJECTIVE AND OBJECTIVE BOX
[facility]                                        Neurology at [place]                                 Frieda Saunders, & Jose                                  [address]                                        [place], [address]                                             [phone]        CC: intracranial hemorrhage     HPI:   The patient is a 84y Male who presented as a transfer to [facility] for management of ICH.  He apparently may have had seizure and right sided weakness and was brought to [facility].  The patient was transferred to [facility] for intracranial hemorrhage management and neurosurgical eval.  He was intubated for transfer.  An extraventricular drain was placed. This was ultimately removed.    Interim history:  Now on 4 Seth.   Remains lethargic.     ROS:   Unobtainable due to patient's condition.     MEDICATIONS  (STANDING):  ALBUTerol    0.083% 2.5 milliGRAM(s) Nebulizer every 6 hours  bromocriptine Tablet 5 milliGRAM(s) Oral <User Schedule>  chlorhexidine 2% Cloths 1 Application(s) Topical daily  doxazosin 1 milliGRAM(s) Oral at bedtime  enoxaparin Injectable 40 milliGRAM(s) SubCutaneous daily  levothyroxine Injectable 25 MICROGram(s) IV Push at bedtime  melatonin 3 milliGRAM(s) Oral at bedtime  modafinil 100 milliGRAM(s) Oral daily  pantoprazole  Injectable 40 milliGRAM(s) IV Push daily  primidone 50 milliGRAM(s) Oral two times a day  propranolol 20 milliGRAM(s) Oral every 6 hours  scopolamine   Patch 1 Patch Transdermal every 72 hours  scopolamine   Patch 1 Patch Transdermal every 72 hours      Vital Signs Last 24 Hrs  T(C): 37.1 (11 Oct 2019 12:14), Max: 37.3 (10 Oct 2019 18:00)  T(F): 98.8 (11 Oct 2019 12:14), Max: 99.2 (10 Oct 2019 18:00)  HR: 67 (11 Oct 2019 08:18) (59 - 69)  BP: 140/71 (11 Oct 2019 07:45) (116/75 - 140/71)  BP(mean): 88 (11 Oct 2019 07:45) (72 - 88)  RR: 17 (11 Oct 2019 07:45) (17 - 27)  SpO2: 96% (11 Oct 2019 08:18) (92% - 100%)    Detailed Neurologic Exam:    Mental status: The patient is sleepy and no longer arouses to voice. He does not follow instructions. Unable to assess orientation or language.    Cranial nerves: Pupils unable to assess, forcefully keeps eyelids shut. Extraocular motion is difficult to assess as he does not open eyes/keep eyes open. Facial musculature is asymmetric with central right weakness. Palate and tongue are difficult to evaluate.     Motor: There is normal bulk and tone.  There is no tremor.  Moving right minimally (2-3/5) to stimuli.  Moving left 5/5 to stimuli.     Sensory: grimace to pinch x 4 ext    Reflexes: diminished throughout and plantar responses are flexor left, extensor right.    Cerebellar: Cannot be assessed.     Labs:     10-11    130<L>  |  92<L>  |  26.0<H>  ----------------------------<  142<H>  5.2   |  24.0  |  0.74    Ca    8.8      11 Oct 2019 09:24                              12.0   10.10 )-----------( 300      ( 11 Oct 2019 04:55 )             36.6

## 2019-10-11 NOTE — PROCEDURE NOTE - NSINFORMCONSENT_GEN_A_CORE
This was an emergent procedure.
Benefits, risks, and possible complications of procedure explained to patient/caregiver who verbalized understanding and gave verbal consent.
This was an emergent procedure.
Benefits, risks, and possible complications of procedure explained to patient/caregiver who verbalized understanding and gave written consent.

## 2019-10-11 NOTE — PROGRESS NOTE ADULT - SUBJECTIVE AND OBJECTIVE BOX
Patient is a 84y old  Male who presents with a chief complaint of ICH (10 Oct 2019 14:48)      HPI:  Unable to obtain full hx and pt intubated and AMS.    85 y/o M with reported hx of HTN and hypothyroid transferred to [facility] for intracranial hemorrhage management and neurosurgical eval.  Intubated for transfer.  EVD placed. (27 Sep 2019 17:30)    FAMILY HISTORY:      INTERVAL HPI/OVERNIGHT EVENTS:  Pt. is seen and examined. Case d/w attending. No overnight events. Pt. obtunded, non-verbal,         PAST MEDICAL & SURGICAL HISTORY:  Hypothyroid  HTN (hypertension)      Allergies    No Known Allergies    Intolerances        Vital Signs Last 24 Hrs  T(C): 37.2 (11 Oct 2019 08:16), Max: 37.6 (10 Oct 2019 11:52)  T(F): 98.9 (11 Oct 2019 08:16), Max: 99.7 (10 Oct 2019 11:52)  HR: 67 (11 Oct 2019 08:18) (59 - 69)  BP: 140/71 (11 Oct 2019 07:45) (116/75 - 140/71)  BP(mean): 88 (11 Oct 2019 07:45) (72 - 88)  RR: 17 (11 Oct 2019 07:45) (17 - 27)  SpO2: 96% (11 Oct 2019 08:18) (92% - 100%)                        12.0   10.10 )-----------( 300      ( 11 Oct 2019 04:55 )             36.6       RADIOLOGY & ADDITIONAL STUDIES:    MEDICATIONS:  acetaminophen    Suspension .. 650 milliGRAM(s) Enteral Tube every 6 hours PRN  ALBUTerol    0.083% 2.5 milliGRAM(s) Nebulizer every 6 hours  bromocriptine Tablet 5 milliGRAM(s) Oral <User Schedule>  chlorhexidine 2% Cloths 1 Application(s) Topical daily  doxazosin 1 milliGRAM(s) Oral at bedtime  enoxaparin Injectable 40 milliGRAM(s) SubCutaneous daily  levothyroxine Injectable 25 MICROGram(s) IV Push at bedtime  melatonin 3 milliGRAM(s) Oral at bedtime  modafinil 100 milliGRAM(s) Oral daily  pantoprazole  Injectable 40 milliGRAM(s) IV Push daily  primidone 50 milliGRAM(s) Oral two times a day  propranolol 20 milliGRAM(s) Oral every 6 hours  scopolamine   Patch 1 Patch Transdermal every 72 hours  scopolamine   Patch 1 Patch Transdermal every 72 hours Patient is a 84y old  Male who presents with a chief complaint of ICH (10 Oct 2019 14:48)      HPI:  Unable to obtain full hx and pt intubated and AMS.    83 y/o M with reported hx of HTN and hypothyroid transferred to [facility] for intracranial hemorrhage management and neurosurgical eval.  Intubated for transfer.  EVD placed. (27 Sep 2019 17:30)    FAMILY HISTORY:      INTERVAL HPI/OVERNIGHT EVENTS:  Pt. is seen and examined. Case d/w attending. No overnight events. Pt. obtunded, non-verbal, doesn't follow commands.  D/w niece at the bedside.  NGT slightly out will confirm with CXR.  K=5.4 repeat is 5.2        PAST MEDICAL & SURGICAL HISTORY:  Hypothyroid  HTN (hypertension)      Allergies    No Known Allergies    Intolerances        Vital Signs Last 24 Hrs  T(C): 37.2 (11 Oct 2019 08:16), Max: 37.6 (10 Oct 2019 11:52)  T(F): 98.9 (11 Oct 2019 08:16), Max: 99.7 (10 Oct 2019 11:52)  HR: 67 (11 Oct 2019 08:18) (59 - 69)  BP: 140/71 (11 Oct 2019 07:45) (116/75 - 140/71)  BP(mean): 88 (11 Oct 2019 07:45) (72 - 88)  RR: 17 (11 Oct 2019 07:45) (17 - 27)  SpO2: 96% (11 Oct 2019 08:18) (92% - 100%)                        12.0   10.10 )-----------( 300      ( 11 Oct 2019 04:55 )             36.6       RADIOLOGY & ADDITIONAL STUDIES:    MEDICATIONS:  acetaminophen    Suspension .. 650 milliGRAM(s) Enteral Tube every 6 hours PRN  ALBUTerol    0.083% 2.5 milliGRAM(s) Nebulizer every 6 hours  bromocriptine Tablet 5 milliGRAM(s) Oral <User Schedule>  chlorhexidine 2% Cloths 1 Application(s) Topical daily  doxazosin 1 milliGRAM(s) Oral at bedtime  enoxaparin Injectable 40 milliGRAM(s) SubCutaneous daily  levothyroxine Injectable 25 MICROGram(s) IV Push at bedtime  melatonin 3 milliGRAM(s) Oral at bedtime  modafinil 100 milliGRAM(s) Oral daily  pantoprazole  Injectable 40 milliGRAM(s) IV Push daily  primidone 50 milliGRAM(s) Oral two times a day  propranolol 20 milliGRAM(s) Oral every 6 hours  scopolamine   Patch 1 Patch Transdermal every 72 hours  scopolamine   Patch 1 Patch Transdermal every 72 hours

## 2019-10-11 NOTE — PROCEDURE NOTE - NSSITEPREP_SKIN_A_CORE
chlorhexidine
povidone iodine (if allergic to chlorhexidine)
chlorhexidine
chlorhexidine
chlorhexidine/Adherence to aseptic technique: hand hygiene prior to donning barriers (gown, gloves), don cap and mask, sterile drape over patient

## 2019-10-11 NOTE — PROCEDURE NOTE - PROCEDURE DATE TIME, MLM
04-Oct-2019 12:01
30-Sep-2019 16:34
07-Oct-2019 16:56
11-Oct-2019 16:40
04-Oct-2019 12:03
27-Sep-2019 15:00

## 2019-10-11 NOTE — PROGRESS NOTE ADULT - ATTENDING COMMENTS
I have seen and examined the patient.  RASS 0 to +1, right side paresia, left sie follows commands.  RBSI ready for extubation. plan to extubate today   CV, renal, ID and endo stable.  needs swallow eval after extubation  will discuss with neurosurgery chemoprophylaxis
MYRON Attg:    see above    patient seen and examined    CT reviewed -- stable without worsening ICH or IVH; official report pending    agree with plan as above  d/c EVD today as patient has had normal ICPs and no radiographic evidence of worsening hcp with clamping trial
Patient seen and examined, discussed patient with Dr. Jama and agree with recommendations.    Patient with decreased arousal. Would consider trail of Amantadine, while optimizing sleep at night with Melatonin.    Will continue to follow. Functional progress will determine ongoing rehab dispo recommendations, which may change.    Continue bedside therapy as well as OOB throughout the day with mobilization by staff to maintain cardiopulmonary function and prevention of secondary complications related to debility.
Patient seen and examined, discussed patient with Dr. Jama and agree with recommendations.    Patient with fluctuating mental status and command following, which has been intermittently stable, but progressively worse. Head CT ind reviewed and is unchanged. Likely related to deompensation from medical comorbidities.     Patient medically/surgically being optimized and continues to be in a guarded state. Given bleed and age as well as prolonged recovery/medical complications are impacting long term potential for potential recovery. At this time, needs total care and may need significant assist long term, and day to day progress will reflect ongoing potential for prognosis.     Will continue to follow. Continue bedside therapy as well as OOB throughout the day with mobilization by staff to maintain cardiopulmonary function and prevention of secondary complications related to debility.
Patient seen and examined, discussed patient with Dr. Jama and agree with recommendations.    Patient's clinical presentation is persistent and low functioning. 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 Spoke briefly with wife and daughters about his current state and not having made significant progress. Patient has NGT and consideration into PEG being discussed. Spoke to Palliative care and are actively involved to assist family.     At this time, prognosis remains poor given limited improvement. Limited in further recommendations, except with providing preventative treatments for secondary complications related to immobility. Provided emotional support to family.     Will sign off, please reconsult for additional rehab dispo needs if functional status changes.
NSGY Attg:    see above    patient seen and examined    CT reviewed -- decreased IVH    agree with exam and plan as above  continue EVD clamp trial  anticipate repeat CT in am
Patient seen and examined on 10/2, discussed patient with Dr. Jama and agree with recommendations.    Would recommend Provigil if arousal does not improve.
Patient seen and examined, discussed patient with Dr. Jama and agree with recommendations.    Patient medically/surgically being optimized and continues to be in a guarded state. Given bleed and age as well as prolonged recovery/medical complications are impacting long term potential for potential recovery. At this time, needs total care and may need significant assist long term, and day to day progress will reflect ongoing potential for prognosis.     Will continue to follow. Continue bedside therapy as well as OOB throughout the day with mobilization by staff to maintain cardiopulmonary function and prevention of secondary complications related to debility.
Seen and examined.      NAD  not following commands, somnolent.  Actively resists attempts at opening eyes.    RRR  non labored resp.  Improved secretions.  Scopolamine patch and HCAP treatment does appear to be working.  Neuro state declining however.  CT stable.  Given decreased suction requirement will remove nasal trumpet.  Consider move to stepdown.  Continue conversations with family regarding goals of care. Prognosis poor.
Seen and examined.      NAD  somnolent.  Arouses to voice, intermittently following commands.  Weakness of RLE, Improved strength RUE  RRR  non  labored resp, mild tachypnea.  Cheyne conway resp.    soft abd    Continues to need significant help with resp secretions.  Aggressive pulm hygiene, nt suctioning.  End tidal and  monitoring.  Low grade fevers continue, procal not elevated.  Bacterial infection unlikely.  Likely neurogenic in origin.  Cont to monitor.  Phos improved.  Will advance tube feeds and monitor response.  Duplex neg yesterday.  Chemical dvt ppx started.  Will discuss potential for neuro stim with PMR.
Seen and examined.    Follows commands, intermittently opens eyes.  Does not maintain arousal.  RRR  + resp distress, large upper airway secretions, tachypnea, shallow resp  abd soft    Continues to require constant nursing care to maintain resp status which remains borderline.  O2 requirement stable from yesterday.  Cont resp care.  Enterobacter HCAP.  Based on culture will complete abx course with current regimen.  Mobilize.  Cont enteric nutrition.  PT/OT/Speech therapy. Neurostim.
Seen and examined.    NAD  Arouses to noxious stimuli.  Exam inconsistent.  Intermittently follows commands.    RRR  non labored resp, but intermittent brief apnea.  Cheyne-Garza type resp.  Poor clearance of sections.    Temporal wasting.  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  EVD in place.  Cont close neuro monitoring.  Maintain -160.  CPP >60.  Aggressive pulm hygiene.  NT suctioning.   and end tidal co2 monitor.  Maintain serum osm 290-305.  Replace feeding tube.  Hypophosphatemia, possible refeeding syndrome, appears to have severe protein calorie malnutrition.  Will restart feeding at 50% caloric need and monitor phos.  Replete aggressively.  Low grade fever. Likely secondary to IVH.  Will check procal to exclude systemic infection source.  DVT screening.  SCD's.  Chemical ppx when ok with NS.
Seen and examined.    NAD  Continues to follow commands, fidgety.  Occasional eye opening.    RRR  Cont Cheyne's conway resp.  unable to clear secretions, upper airway rattles.    Self removed NG feeding tube.  Will replace.  Continues to have fevers , likely non infectious from autonomic instability from stroke.  Stop amantadine add bromocriptine.  Clamped CT pending.  Provigil for improved arousal.  Hypoxia continues to worsen with increase O2 demand.  Extended conversation with family yesterday discussing progress and guarded prognosis.  Remains full code for now but will continue to readdress.  Cont abx for pna.  C&S pending.
Seen and examined.    NAD  RRR  non labored resp  eyes closed, follows commands, left stronger than right.      Continues to require near constant nursing care to keep airway clear.  Hypoxia however does seem slightly improved and will attempt to wean provided O2.  Cont ICU for nursing care.
MYRON Attg:    see above    patient seen and examined     agree with plan as above
MYRON Attg:    see above    patient seen and examined by PA staff    agree with exam as above    cont EVD -- plan to raise EVD to 15
Agree with assessment above and note addended where needed     CONSTITUTIONAL: Obtunded and ill appearing,  Mild respiratory distress with excess secretions   ENMT: NGT in place   EYES: eyes closed   CARDIAC: Normal rate, regular rhythm.  Heart sounds S1, S2.  No murmurs, rubs or gallops   RESPIRATORY: Breath sounds clear and equal bilaterally. No wheezes, rales or rhonchi  GASTROENTEROLOGY: NT/ND, + bowel sounds   EXTREMITIES: generalized anasarca   NEUROLOGICAL: Non verbal, obtunded, unable to follow commands
NSGY Attg:    see above    patient seen and examined    agree with exam and plan as above
Seen and examined.      NAD  Continues to have waxing waning exam, does follow commands.  will not open eyes.  RRR  Cheyne's Garza resp, increase in O2 requirement, large thick pulmonary secretions.    abd soft    Stable neuro exams.  Tolerated EVD increased to 20, clamped this AM.  Cont close neuro checks.  Respstatus remains tenuous.  given fever, secretions o2 requirement, developing infiltrates in BL lower lobes on CXR today will start cefepime vanc flagyl, tobra nebs for HCAP.  Send sputum cx.  Recheck procal.  Cont aggressive chest PT, NT suctioning, pulm hygiene as able.  Hyperchloremic acidosis, switch to sodium acetate.
Seen and examined.    NAD  Weakly intermittently follows commands.  Less consistent than yesterday  RRR  non labored resp.  Still with moderate secretions, not clearing.    O2 requirement seems better.  Some improvement in oxygenation, weaning FiO2.  Cont HCAP treatment , secretions somewhat improved but still requires ICU level of care for pulm hygiene.  Given decreased mentation will re ct head.  Prognosis remains poor.
NSGY Attg:    see above    patient seen and examined    opens eyes to voice  + commands on left  RHP    CT reviewed -- EVD in left lateral ventricle    agree with plan as above  cont EVD OTD at 10
Seen and examined.    NAD  Following commands, weak on right.  Somnolent.    RRR  non labored resp.  continues to have resp secretions requiring frequent suctioning.  Increased O2 requirement.      Exam slightly worse today on RUE.  Worsened resp status.  Concerned may need intubation in near future.  Cont abx for HCAP despite normal pro brian.  will continue to reassess.  abx to be adjusted as per culture.  Acidosis improved.  Will stop acetate solution.  Replace feeding tube that was pulled out during care.  Tube feeds. Stop IV fluids.  Check abg given resp concerns, tachycardia and htn this AM.  If co2 OK will adjust BP meds.  Goal remains -160.
Agree with above plan of care  Patient has had extensive hospital course with minimal meaningful recovery, remain obtunded, non verbal and does not follow commands. Extensive d/w the patients family, in regards to the patients overall guarded prognosis and high risk for further decline given pt has inability to clear his secretions. Pt had episode of acute respiratory failure with hypoxia, was suctioned and NGT had to be replaced. Repeat cxr with NGT noted to be in place and although cxr negative for chest dz likely that pt again aspirated post hypoxic event. Pt up titrated to venti mask. Also with up trend in leukocytosis to 20 with left shift. D/w ID and will restart zosyn empirically for aspiration pna tx suspected GNR infection. Also with noted hyperkalemia this am but on repeat labs with improvement. Again with labs later showing hyperkalemia and given reversal by nokturnist. Repeat ordered for the am. Given these findings will be placed on  and monitored bed. Wife was updated in regards to the plan of care. Pt remains with extremely guarded prognosis and family is aware, but continue to hold on bc of the patients grandsons wedding coming up on 10/20/19.       CONSTITUTIONAL: Obtunded and ill appearing,  Mild respiratory distress with excess secretions   ENMT: NGT in place   EYES: eyes closed   CARDIAC: Normal rate, regular rhythm.  Heart sounds S1, S2.  No murmurs, rubs or gallops   RESPIRATORY: Breath sounds clear and equal bilaterally. No wheezes, rales or rhonchi  GASTROENTEROLOGY: NT/ND, + bowel sounds   EXTREMITIES: generalized anasarca   NEUROLOGICAL: Non verbal, obtunded, unable to follow commands .

## 2019-10-11 NOTE — PROCEDURE NOTE - NSPROCDETAILS_GEN_ALL_CORE
Left Nosteril/location identified, feeding tube inserted
sterile technique, indwelling urinary device inserted/a urinary catheter insertion kit was used for all insertion materials
Left nostril/location identified, feeding tube inserted

## 2019-10-11 NOTE — PROGRESS NOTE ADULT - ASSESSMENT
85 y/o M with hx of HTN, BPH and hypothyroid transferred to [facility] from [facility] [place] for intracranial hemorrhage management and neurosurgical eval. Pt was intubated for transfer. 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 Upon arrival had emergent EVD placement which was done at the bedside in the ICU. ICH likely secondary to htn emergency, pt was placed on bp control. Also noted with acute respiratory failure with hypoxia, excess secretions and suspected HCAPNA empirically remained on vanco/ cefepime and inhaled tobramycin which was discontinued by ID today.  Pt was able to be extubated on 10/1. Remains with excess secretions and with low grade fevers tmax: 100.8 unclear if related to current infection/ central fevers. S/p R EVD removal 10/4. Radiographically stable on repeat CTh with clearing IVH. Neuro sx/ neuro continued to follow the pt. Pt continues to be unresponsive, minimally responding to commands, remains with NGT in place for feeding, unable to clear his own secretions at times. Continues on scopolamine and suctioning frequently. Overall guarded prognosis, palliative care team continues to follow the patient. Family has agreed to DNR/DNI but to continue with current care. Ongoing discussions with the family in regards to the tx plan, pt would benefit from hospice intervention, family has the patient's grandson wedding on 10/20/19 and they do not want to make any decisions until then. Palliative Care team is following patient.     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  -s/p placement and removal of EVD  - remains obtunded with no meaningful recovery noted   - stable repeat ct head   - As per neuro sx no need for repeat ct head unless further decline or change in mental status  - c/w neurochecks per routine   - NGT is slightly out will need urgent CXR to confirm  - hold PEG medications/ feeding   - remains with dysphagia/ unresponsiveness  - pt unable to clear secretions  - c/w scopolamine and frequent suctioning   - Noted pulmonary toilet  - will repeat cxr and see if fluid overloaded- possible lasix prn if needed   - montejo to remain in place   - frequent repositioning   - Chest PT  - neuro f/u noted and appreciated   - neuro sx f/u noted and appreciated   - palliative care f/u noted and appreciated- daughter Maral stating that the family will not make a decision for [facility] until the grandson's wedding 10/20/19. Extensive ongoing discussions with the patients wife and family, discussing patients quality of life and wishes. Which wife continues to state that the pt would not have wanted to live like this. Emotional support continues to be provided     Hyperkalemia: -order BMP repeat    Fever unclear etiology  - s/p abx tx for HCAPNA/ aspiration PNA, clinical concern that low grade fevers are central   - afebrile   - tmax: 99.7   - leukocytosis likely reactive  /WBC=10.10  - d/c all abx per ID on 10/8   -ID consult noted and appreciated 	    Seizure   - c/w primidone ngt qhs     Hypothyroid  - tsh wnl   - c/w synthroid 25mcg IV QHS      DVT ppx  - c/w lovenox sq qd   - scd boots b/l    Activity level: Bedbound     Advanced Directive: DNR DNI   Niece updated at bedside.   Daughter per previous conversations adamant that no decision be made until after her sons wedding.   D/w her and patients wife that although the wedding is important to the family, it is far more important to honor and respect their fathers wishes.   They understand this but are having a hard time coming to terms with everything especially bc the wedding is on 10/20.   Daughter did request hospice information, more so for bereavement support for her mother.       Dispo: Guarded prognosis, would benefit from hospice intervention. Ongoing discussions with the family, hospitalist team and palliative care team, in regards to the plan of care. Family wants to wait until grandson's wedding 10/20/19 to make any decisions regarding Comfort [facility]. Pt has not made any clinical improvement and family has been informed that pt is at high risk of aspiration w ngt feedings as patient has demonstrated inability to clear secretions. 83 y/o M with hx of HTN, BPH and hypothyroid transferred to [facility] [place] [facility] [place] hemorrhage management and neurosurgical eval. Pt was intubated for transfer. 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 Upon arrival had emergent EVD placement which was done at the bedside in the ICU. ICH likely secondary to htn emergency, pt was placed on bp control. Also noted with acute respiratory failure with hypoxia, excess secretions and suspected HCAPNA empirically remained on vanco/ cefepime and inhaled tobramycin which was discontinued by ID today.  Pt was able to be extubated on 10/1. Remains with excess secretions and with low grade fevers tmax: 100.8 unclear if related to current infection/ central fevers. S/p R EVD removal 10/4. Radiographically stable on repeat CTh with clearing IVH. Neuro sx/ neuro continued to follow the pt. Pt continues to be unresponsive, minimally responding to commands, remains with NGT in place for feeding, unable to clear his own secretions at times. Continues on scopolamine and suctioning frequently. Overall guarded prognosis, palliative care team continues to follow the patient. Family has agreed to DNR/DNI but to continue with current care. Ongoing discussions with the family in regards to the tx plan, pt would benefit from hospice intervention, family has the patient's grandson wedding on 10/20/19 and they do not want to make any decisions until then. Palliative Care team is following patient.     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  -s/p placement and removal of EVD  - remains obtunded with no meaningful recovery noted   - stable repeat ct head   - As per neuro sx no need for repeat ct head unless further decline or change in mental status  - c/w neurochecks per routine   - NGT is slightly out will need urgent CXR to confirm  - hold PEG medications/ feeding   - remains with dysphagia/ unresponsiveness  - pt unable to clear secretions  - c/w scopolamine and frequent suctioning   - Noted pulmonary toilet  - will repeat cxr and see if fluid overloaded- possible lasix prn if needed   - montejo to remain in place   - frequent repositioning   - Chest PT  - neuro f/u noted and appreciated   - neuro sx f/u noted and appreciated   - palliative care f/u noted and appreciated- daughter Maral stating that the family will not make a decision for [facility] until the grandson's wedding 10/20/19. Extensive ongoing discussions with the patients wife and family, discussing patients quality of life and wishes. Which wife continues to state that the pt would not have wanted to live like this. Emotional support continues to be provided     Hyperkalemia: -order BMP repeat K= 5.2  Monitor    Fever unclear etiology  - s/p abx tx for HCAPNA/ aspiration PNA, clinical concern that low grade fevers are central   - afebrile   - tmax: 99.7   - leukocytosis likely reactive  /WBC=10.10  - d/c all abx per ID on 10/8   -ID consult noted and appreciated 	    Seizure   - c/w primidone ngt qhs     Hypothyroid  - tsh wnl   - c/w synthroid 25mcg IV QHS      DVT ppx  - c/w lovenox sq qd   - scd boots b/l    Activity level: Bedbound     Advanced Directive: DNR DNI   Niece updated at bedside.   Daughter per previous conversations adamant that no decision be made until after her sons wedding.   D/w her and patients wife that although the wedding is important to the family, it is far more important to honor and respect their fathers wishes.   They understand this but are having a hard time coming to terms with everything especially bc the wedding is on 10/20.   Daughter did request hospice information, more so for bereavement support for her mother.       Dispo: Guarded prognosis, would benefit from hospice intervention. Ongoing discussions with the family, hospitalist team and palliative care team, in regards to the plan of care. Family wants to wait until grandson's wedding 10/20/19 to make any decisions regarding Comfort [facility]. Pt has not made any clinical improvement and family has been informed that pt is at high risk of aspiration w ngt feedings as patient has demonstrated inability to clear secretions. 83 y/o M with hx of HTN, BPH and hypothyroid transferred to [facility] from [facility] [place] for intracranial hemorrhage management and neurosurgical eval. Pt was intubated for transfer. 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 Upon arrival had emergent EVD placement which was done at the bedside in the ICU. ICH likely secondary to htn emergency, pt was placed on bp control. Also noted with acute respiratory failure with hypoxia, excess secretions and suspected HCAPNA empirically remained on vanco/ cefepime and inhaled tobramycin which was discontinued by ID today.  Pt was able to be extubated on 10/1. Remains with excess secretions and with low grade fevers tmax: 100.8 unclear if related to current infection/ central fevers. S/p R EVD removal 10/4. Radiographically stable on repeat CTh with clearing IVH. Neuro sx/ neuro continued to follow the pt. Pt continues to be unresponsive, minimally responding to commands, remains with NGT in place for feeding, unable to clear his own secretions at times. Continues on scopolamine and suctioning frequently. Overall guarded prognosis, palliative care team continues to follow the patient. Family has agreed to DNR/DNI but to continue with current care. Ongoing discussions with the family in regards to the tx plan, pt would benefit from hospice intervention, family has the patient's grandson wedding on 10/20/19 and they do not want to make any decisions until then. Palliative Care team is following patient.     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  -s/p placement and removal of EVD  - remains obtunded with no meaningful recovery noted   - stable repeat ct head   - As per neuro sx no need for repeat ct head unless further decline or change in mental status  - c/w neurochecks per routine   - NGT is slightly out will need urgent CXR to confirm  - hold PEG medications/ feeding   - remains with dysphagia/ unresponsiveness  - pt unable to clear secretions  - c/w scopolamine and frequent suctioning   - Noted pulmonary toilet  - will repeat cxr and see if fluid overloaded- possible lasix prn if needed   - montejo to remain in place   - frequent repositioning   - Chest PT  - neuro f/u noted and appreciated   - neuro sx f/u noted and appreciated   - palliative care f/u noted and appreciated- daughter Maral stating that the family will not make a decision for [facility] until the grandson's wedding 10/20/19. Extensive ongoing discussions with the patients wife and family, discussing patients quality of life and wishes. Which wife continues to state that the pt would not have wanted to live like this. Emotional support continues to be provided     Hyperkalemia: -order BMP repeat K= 5.2  Monitor    Fever unclear etiology  - s/p abx tx for HCAPNA/ aspiration PNA, clinical concern that low grade fevers are central   - afebrile   - tmax: 99.7   - leukocytosis likely reactive  /WBC=10.10  - d/c all abx per ID on 10/8   -ID consult noted and appreciated 	    Seizure   - c/w primidone ngt qhs     Hypothyroid  - tsh wnl   - c/w synthroid 25mcg IV QHS      DVT ppx  - c/w lovenox sq qd   - scd boots b/l    Activity level: Bedbound     Advanced Directive: DNR DNI   Niece updated at bedside.   Daughter per previous conversations adamant that no decision be made until after her sons wedding.   D/w her and patients wife that although the wedding is important to the family, it is far more important to honor and respect their fathers wishes.   They understand this but are having a hard time coming to terms with everything especially bc the wedding is on 10/20.   Daughter did request hospice information, more so for bereavement support for her mother.       Dispo: Guarded prognosis, would benefit from hospice intervention. Ongoing discussions with the family, hospitalist team and palliative care team, in regards to the plan of care. Family wants to wait until grandson's wedding 10/20/19 to make any decisions regarding Com[facility]. Pt has not made any clinical improvement and family has been informed that pt is at high risk of aspiration w ngt feedings as patient has demonstrated inability to clear secretions.     Addendum:  Pt. spiked fever 102.6 rectally, desat to 86% on NC 5L/min,   Possibility of aspiration of feeds and secretions explained to the wife. Explained to her it might be happening frequently. She lay her head next to him on the bed.  Placed on VM with O2 sat came up to above 93%.  CXR ordered urgent- called to Radiology to expedite.  NGT replaced  Blood c&s, PT/INR, CBC, ua, lactate ordered STAT.  Will talk to ID for any additional suggestions 83 y/o M with hx of HTN, BPH and hypothyroid [place] [facility] [place] [facility] [place] and neurosurgical eval. Pt was intubated for transfer. [place] [facility] [place] large left basal ganglia hemorrhage with intraventricular extension. Upon arrival had emergent EVD placement which was done at the bedside in the ICU. ICH likely secondary to htn emergency, pt was placed on bp control. Also noted with acute respiratory failure with hypoxia, excess secretions and suspected HCAPNA empirically remained on vanco/ cefepime and inhaled tobramycin which was discontinued by ID today.  Pt was able to be extubated on 10/1. Remains with excess secretions and with low grade fevers tmax: 100.8 unclear if related to current infection/ central fevers. S/p R EVD removal 10/4. Radiographically stable on repeat CTh with clearing IVH. Neuro sx/ neuro continued to follow the pt. Pt continues to be unresponsive, minimally responding to commands, remains with NGT in place for feeding, unable to clear his own secretions at times. Continues on scopolamine and suctioning frequently. Overall guarded prognosis, palliative care team continues to follow the patient. Family has agreed to DNR/DNI but to continue with current care. Ongoing discussions with the family in regards to the tx plan, pt would benefit from hospice intervention, family has the patient's grandson wedding on 10/20/19 and they do not want to make any decisions until then. Palliative Care team is following patient.     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  -s/p placement and removal of EVD  - remains obtunded with no meaningful recovery noted   - stable repeat ct head   - As per neuro sx no need for repeat ct head unless further decline or change in mental status  - c/w neurochecks per routine   - NGT is slightly out will need urgent CXR to confirm  - hold PEG medications/ feeding   - remains with dysphagia/ unresponsiveness  - pt unable to clear secretions  - c/w scopolamine and frequent suctioning   - Noted pulmonary toilet  - will repeat cxr and see if fluid overloaded- possible lasix prn if needed   - montejo to remain in place   - frequent repositioning   - Chest PT  - neuro f/u noted and appreciated   - neuro sx f/u noted and appreciated   - palliative care f/u noted and appreciated- daughter Maral stating that the family will not make a decision for [facility] until the grandson's wedding 10/20/19. Extensive ongoing discussions with the patients wife and family, discussing patients quality of life and wishes. Which wife continues to state that the pt would not have wanted to live like this. Emotional support continues to be provided     Hyperkalemia: -order BMP repeat K= 5.2  Monitor    Fever unclear etiology  - s/p abx tx for HCAPNA/ aspiration PNA, clinical concern that low grade fevers are central   - afebrile   - tmax: 99.7   - leukocytosis likely reactive  /WBC=10.10  - d/c all abx per ID on 10/8   -ID consult noted and appreciated 	    Seizure   - c/w primidone ngt qhs     Hypothyroid  - tsh wnl   - c/w synthroid 25mcg IV QHS      DVT ppx  - c/w lovenox sq qd   - scd boots b/l    Activity level: Bedbound     Advanced Directive: DNR DNI   Niece updated at bedside.   Daughter per previous conversations adamant that no decision be made until after her sons wedding.   D/w her and patients wife that although the wedding is important to the family, it is far more important to honor and respect their fathers wishes.   They understand this but are having a hard time coming to terms with everything especially bc the wedding is on 10/20.   Daughter did request hospice information, more so for bereavement support for her mother.       Dispo: Guarded prognosis, would benefit from hospice intervention. Ongoing discussions with the family, hospitalist team and palliative care team, in regards to the plan of care. Family wants to wait until grandson's wedding 10/20/19 to make any decisions regarding Com[facility] Care. Pt has not made any clinical improvement and family has been informed that pt is at high risk of aspiration w ngt feedings as patient has demonstrated inability to clear secretions.     Addendum:  Pt. spiked fever 102.6 rectally, desat to 86% on NC 5L/min,   Possibility of aspiration of feeds and secretions explained to the wife. Explained to her it might be happening frequently. She lay her head next to him on the bed.  Placed on VM with O2 sat came up to above 93%.  CXR ordered urgent- called to Radiology to expedite.  NGT replaced  Blood c&s, PT/INR, CBC, ua, lactate, procalcitonin ordered STAT.  Zosyn started  Will talk to ID for any additional suggestions

## 2019-10-11 NOTE — PROGRESS NOTE ADULT - ASSESSMENT
84y Male who is followed by neurology because of ICH    ICH  Exam continues to worsen.  Likely hypertensive etiology  Avoid anti platelet medications.  Avoid anti coagulant medications.  Off Keppra  Repeat head CT 10/7 grossly stable basal ganglia ICH and intraventricular hemorrhage, no hydrocephalus.  Now DNR, DNI    Hypertension   Control blood pressure.     Palliative care following.

## 2019-10-11 NOTE — PROGRESS NOTE ADULT - SUBJECTIVE AND OBJECTIVE BOX
CC:  follow up GOC  INTERVAL HPI/OVERNIGHT EVENTS:  spoke to nurse Jeny-  issues of secretions, on venti mask    PRESENT SYMPTOMS: SOURCE:  Patient/Family/Team    PAIN SCALE:  0 = none  1 = mild   2 = moderate  3 = severe    Pain:     Dyspnea:  [ ] YES [ x] NO  Anxiety:  [ ] YES [ x] NO  Fatigue: [ x] YES [ ] NO  Nausea: [ ] YES [ x] NO  Loss of Appetite: x[ ] YES [ ] NO  Other symptoms: __________    MEDICATIONS  (STANDING):  ALBUTerol    0.083% 2.5 milliGRAM(s) Nebulizer every 6 hours  bromocriptine Tablet 5 milliGRAM(s) Oral <User Schedule>  chlorhexidine 2% Cloths 1 Application(s) Topical daily  doxazosin 1 milliGRAM(s) Oral at bedtime  enoxaparin Injectable 40 milliGRAM(s) SubCutaneous daily  levothyroxine Injectable 25 MICROGram(s) IV Push at bedtime  melatonin 3 milliGRAM(s) Oral at bedtime  pantoprazole  Injectable 40 milliGRAM(s) IV Push daily  primidone 50 milliGRAM(s) Oral two times a day  propranolol 20 milliGRAM(s) Oral every 6 hours  scopolamine   Patch 1 Patch Transdermal every 72 hours  scopolamine   Patch 1 Patch Transdermal every 72 hours    MEDICATIONS  (PRN):  acetaminophen    Suspension .. 650 milliGRAM(s) Enteral Tube every 6 hours PRN Temp greater or equal to 38C (100.4F), Mild Pain (1 - 3)      Allergies    No Known Allergies    Intolerances      Karnofsky Performance Score/Palliative Performance Status Version 2:  20 %    Vital Signs Last 24 Hrs  T(C): 37.1 (11 Oct 2019 12:14), Max: 37.3 (10 Oct 2019 18:00)  T(F): 98.8 (11 Oct 2019 12:14), Max: 99.2 (10 Oct 2019 18:00)  HR: 74 (11 Oct 2019 12:00) (59 - 74)  BP: 141/67 (11 Oct 2019 12:00) (116/75 - 141/67)  BP(mean): 85 (11 Oct 2019 12:00) (72 - 88)  RR: 24 (11 Oct 2019 12:00) (17 - 27)  SpO2: 95% (11 Oct 2019 12:00) (92% - 100%)    PHYSICAL EXAM:    General: Lethargic, does not open eyes to verbal or tactile stimuli    HEENT: [x ] normal  [ ] dry mouth  [ ] ET tube/trach    Lungs: [ x] comfortable [ ] tachypnea/labored breathing  [ ] excessive secretions    CV: [ x] normal  [ ] tachycardia    GI: [ x] normal  [ ] distended  [ ] tender  [ ] no BS               [ ] PEG/NG/OG tube    : [ x normal  [x ] incontinent  [ ] oliguria/anuria  [ ] montejo    MSK: [ ] normal  [x] weakness  [ ] edema             [ ] ambulatory  [ x] bedbound/wheelchair bound    Skin: [ ] normal  [ ] pressure ulcers- Stage_____  [ x] no rash    LABS:                        12.0   10.10 )-----------( 300      ( 11 Oct 2019 04:55 )             36.6     10-11    130<L>  |  92<L>  |  26.0<H>  ----------------------------<  142<H>  5.2   |  24.0  |  0.74    Ca    8.8      11 Oct 2019 09:24          I&O's Summary    10 Oct 2019 07:01  -  11 Oct 2019 07:00  --------------------------------------------------------  IN: 1940 mL / OUT: 2450 mL / NET: -510 mL    11 Oct 2019 07:01  -  11 Oct 2019 13:33  --------------------------------------------------------  IN: 120 mL / OUT: 200 mL / NET: -80 mL        Thank you for the opportunity to assist with the care of this patient.   [facility] [phone].

## 2019-10-11 NOTE — PROGRESS NOTE ADULT - PROBLEM SELECTOR PLAN 4
Multiple meetings with family.  Family understands his prognosis is poor - do not want PEG  They understand this is end of life but will not make any decisions regarding comfort care until after grandson's wedding on 10/20 .  They have been informed of risk of aspiration because of his secretions  and further respiratory compromise.

## 2019-10-11 NOTE — PROCEDURE NOTE - NSTOLERANCE_GEN_A_CORE
Reversal agents were not used./Patient tolerated procedure well.
Patient tolerated procedure well.

## 2019-10-12 LAB
ANION GAP SERPL CALC-SCNC: 10 MMOL/L — SIGNIFICANT CHANGE UP (ref 5–17)
BASOPHILS # BLD AUTO: 0.08 K/UL — SIGNIFICANT CHANGE UP (ref 0–0.2)
BASOPHILS NFR BLD AUTO: 0.6 % — SIGNIFICANT CHANGE UP (ref 0–2)
BUN SERPL-MCNC: 34 MG/DL — HIGH (ref 8–20)
CALCIUM SERPL-MCNC: 9 MG/DL — SIGNIFICANT CHANGE UP (ref 8.6–10.2)
CHLORIDE SERPL-SCNC: 95 MMOL/L — LOW (ref 98–107)
CO2 SERPL-SCNC: 24 MMOL/L — SIGNIFICANT CHANGE UP (ref 22–29)
CREAT SERPL-MCNC: 0.83 MG/DL — SIGNIFICANT CHANGE UP (ref 0.5–1.3)
EOSINOPHIL # BLD AUTO: 0.18 K/UL — SIGNIFICANT CHANGE UP (ref 0–0.5)
EOSINOPHIL NFR BLD AUTO: 1.3 % — SIGNIFICANT CHANGE UP (ref 0–6)
GLUCOSE BLDC GLUCOMTR-MCNC: 136 MG/DL — HIGH (ref 70–99)
GLUCOSE BLDC GLUCOMTR-MCNC: 155 MG/DL — HIGH (ref 70–99)
GLUCOSE SERPL-MCNC: 141 MG/DL — HIGH (ref 70–115)
HCT VFR BLD CALC: 35.8 % — LOW (ref 39–50)
HGB BLD-MCNC: 12 G/DL — LOW (ref 13–17)
IMM GRANULOCYTES NFR BLD AUTO: 1.2 % — SIGNIFICANT CHANGE UP (ref 0–1.5)
LYMPHOCYTES # BLD AUTO: 1.25 K/UL — SIGNIFICANT CHANGE UP (ref 1–3.3)
LYMPHOCYTES # BLD AUTO: 9 % — LOW (ref 13–44)
MCHC RBC-ENTMCNC: 31.7 PG — SIGNIFICANT CHANGE UP (ref 27–34)
MCHC RBC-ENTMCNC: 33.5 GM/DL — SIGNIFICANT CHANGE UP (ref 32–36)
MCV RBC AUTO: 94.7 FL — SIGNIFICANT CHANGE UP (ref 80–100)
MONOCYTES # BLD AUTO: 0.79 K/UL — SIGNIFICANT CHANGE UP (ref 0–0.9)
MONOCYTES NFR BLD AUTO: 5.7 % — SIGNIFICANT CHANGE UP (ref 2–14)
NEUTROPHILS # BLD AUTO: 11.44 K/UL — HIGH (ref 1.8–7.4)
NEUTROPHILS NFR BLD AUTO: 82.2 % — HIGH (ref 43–77)
PLATELET # BLD AUTO: 323 K/UL — SIGNIFICANT CHANGE UP (ref 150–400)
POTASSIUM SERPL-MCNC: 5.5 MMOL/L — HIGH (ref 3.5–5.3)
POTASSIUM SERPL-SCNC: 5.5 MMOL/L — HIGH (ref 3.5–5.3)
RBC # BLD: 3.78 M/UL — LOW (ref 4.2–5.8)
RBC # FLD: 14.1 % — SIGNIFICANT CHANGE UP (ref 10.3–14.5)
SODIUM SERPL-SCNC: 129 MMOL/L — LOW (ref 135–145)
WBC # BLD: 13.9 K/UL — HIGH (ref 3.8–10.5)
WBC # FLD AUTO: 13.9 K/UL — HIGH (ref 3.8–10.5)

## 2019-10-12 PROCEDURE — 99232 SBSQ HOSP IP/OBS MODERATE 35: CPT

## 2019-10-12 PROCEDURE — 99233 SBSQ HOSP IP/OBS HIGH 50: CPT

## 2019-10-12 PROCEDURE — 93010 ELECTROCARDIOGRAM REPORT: CPT

## 2019-10-12 RX ORDER — SODIUM POLYSTYRENE SULFONATE 4.1 MEQ/G
30 POWDER, FOR SUSPENSION ORAL ONCE
Refills: 0 | Status: COMPLETED | OUTPATIENT
Start: 2019-10-12 | End: 2019-10-12

## 2019-10-12 RX ADMIN — PIPERACILLIN AND TAZOBACTAM 25 GRAM(S): 4; .5 INJECTION, POWDER, LYOPHILIZED, FOR SOLUTION INTRAVENOUS at 13:27

## 2019-10-12 RX ADMIN — Medication 500 MILLIGRAM(S): at 00:44

## 2019-10-12 RX ADMIN — ALBUTEROL 2.5 MILLIGRAM(S): 90 AEROSOL, METERED ORAL at 14:54

## 2019-10-12 RX ADMIN — SODIUM POLYSTYRENE SULFONATE 30 GRAM(S): 4.1 POWDER, FOR SUSPENSION ORAL at 10:45

## 2019-10-12 RX ADMIN — SCOPALAMINE 1 PATCH: 1 PATCH, EXTENDED RELEASE TRANSDERMAL at 20:00

## 2019-10-12 RX ADMIN — PIPERACILLIN AND TAZOBACTAM 25 GRAM(S): 4; .5 INJECTION, POWDER, LYOPHILIZED, FOR SOLUTION INTRAVENOUS at 23:08

## 2019-10-12 RX ADMIN — BROMOCRIPTINE MESYLATE 5 MILLIGRAM(S): 5 CAPSULE ORAL at 11:11

## 2019-10-12 RX ADMIN — ALBUTEROL 2.5 MILLIGRAM(S): 90 AEROSOL, METERED ORAL at 09:56

## 2019-10-12 RX ADMIN — Medication 250 MILLIGRAM(S): at 17:05

## 2019-10-12 RX ADMIN — Medication 3 MILLIGRAM(S): at 23:06

## 2019-10-12 RX ADMIN — SODIUM POLYSTYRENE SULFONATE 30 GRAM(S): 4.1 POWDER, FOR SUSPENSION ORAL at 00:43

## 2019-10-12 RX ADMIN — PANTOPRAZOLE SODIUM 40 MILLIGRAM(S): 20 TABLET, DELAYED RELEASE ORAL at 11:11

## 2019-10-12 RX ADMIN — SCOPALAMINE 1 PATCH: 1 PATCH, EXTENDED RELEASE TRANSDERMAL at 07:50

## 2019-10-12 RX ADMIN — Medication 1 MILLIGRAM(S): at 23:07

## 2019-10-12 RX ADMIN — PRIMIDONE 50 MILLIGRAM(S): 250 TABLET ORAL at 05:25

## 2019-10-12 RX ADMIN — SCOPALAMINE 1 PATCH: 1 PATCH, EXTENDED RELEASE TRANSDERMAL at 17:52

## 2019-10-12 RX ADMIN — ALBUTEROL 2.5 MILLIGRAM(S): 90 AEROSOL, METERED ORAL at 04:39

## 2019-10-12 RX ADMIN — ALBUTEROL 2.5 MILLIGRAM(S): 90 AEROSOL, METERED ORAL at 21:56

## 2019-10-12 RX ADMIN — ENOXAPARIN SODIUM 40 MILLIGRAM(S): 100 INJECTION SUBCUTANEOUS at 11:11

## 2019-10-12 RX ADMIN — PIPERACILLIN AND TAZOBACTAM 25 GRAM(S): 4; .5 INJECTION, POWDER, LYOPHILIZED, FOR SOLUTION INTRAVENOUS at 05:25

## 2019-10-12 RX ADMIN — Medication 25 MICROGRAM(S): at 23:08

## 2019-10-12 RX ADMIN — BROMOCRIPTINE MESYLATE 5 MILLIGRAM(S): 5 CAPSULE ORAL at 05:25

## 2019-10-12 RX ADMIN — Medication 250 MILLIGRAM(S): at 05:25

## 2019-10-12 RX ADMIN — CHLORHEXIDINE GLUCONATE 1 APPLICATION(S): 213 SOLUTION TOPICAL at 11:11

## 2019-10-12 RX ADMIN — PRIMIDONE 50 MILLIGRAM(S): 250 TABLET ORAL at 17:05

## 2019-10-12 NOTE — PROGRESS NOTE ADULT - SUBJECTIVE AND OBJECTIVE BOX
HANNAH HERNANEDZ Patient is a 84y old  Male who presents with a chief complaint of ICH (12 Oct 2019 13:06)     HPI:  Unable to obtain full hx and pt intubated and AMS.    85 y/o M with reported hx of HTN and hypothyroid transferred to [facility] for intracranial hemorrhage management and neurosurgical eval.  Intubated for transfer.  EVD placed. (27 Sep 2019 17:30)    The patient was seen and evaluated   The patient is in no acute distress.        I&O's Summary    11 Oct 2019 07:  -  12 Oct 2019 07:00  --------------------------------------------------------  IN: 250 mL / OUT: 800 mL / NET: -550 mL    12 Oct 2019 07:01  -  12 Oct 2019 14:04  --------------------------------------------------------  IN: 610 mL / OUT: 0 mL / NET: 610 mL      Allergies    No Known Allergies    Intolerances      HEALTH ISSUES - PROBLEM Dx:  Fever: Fever  HTN (hypertension): HTN (hypertension)  Dysphagia: Dysphagia  Aspiration pneumonia, unspecified aspiration pneumonia type, unspecified laterality, unspecified part of lung: Aspiration pneumonia, unspecified aspiration pneumonia type, unspecified laterality, unspecified part of lung  Advance care planning: Advance care planning  Pneumonia: Pneumonia  Encounter for palliative care: Encounter for palliative care  Basal ganglia hemorrhage: Basal ganglia hemorrhage  Hypothyroid: Hypothyroid  Acute respiratory failure with hypoxia: Acute respiratory failure with hypoxia  Hypertensive emergency: Hypertensive emergency  Intraventricular hemorrhage: Intraventricular hemorrhage  Hemorrhagic stroke: Hemorrhagic stroke  Intracranial bleed: Intracranial bleed        PAST MEDICAL & SURGICAL HISTORY:  Hypothyroid  HTN (hypertension)          Vital Signs Last 24 Hrs  T(C): 36.9 (12 Oct 2019 09:00), Max: 39.2 (11 Oct 2019 16:29)  T(F): 98.4 (12 Oct 2019 09:00), Max: 102.6 (11 Oct 2019 16:29)  HR: 79 (12 Oct 2019 09:56) (67 - 112)  BP: 130/65 (12 Oct 2019 09:00) (126/55 - 153/67)  BP(mean): --  RR: 18 (12 Oct 2019 09:00) (18 - 22)  SpO2: 94% (12 Oct 2019 09:56) (92% - 100%)T(C): 36.9 (10-12-19 @ 09:00), Max: 39.2 (10-11-19 @ 16:29)  HR: 79 (10-12-19 @ 09:56) (67 - 112)  BP: 130/65 (10-12-19 @ 09:00) (126/55 - 153/67)  RR: 18 (10-12-19 @ 09:00) (18 - 22)  SpO2: 94% (10-12-19 @ 09:56) (92% - 100%)  Wt(kg): --    PHYSICAL EXAM:  Physical Exam:   		  · Constitutional Details	no distress	  · Constitutional Comments	obtunded in bed, not opening his eyes, ill appearing	  · ENMT	detailed exam	  · ENMT Comments	NGT seems to be slightly more out than expected	  · Respiratory	detailed exam	  · Respiratory Details	airway patent; respirations non-labored; rhonchi	  · Rhonchi	upper L; upper R	  · Respiratory Comments	excess secretions	  		  · Cardiovascular Details	regular rate and rhythm	  · Gastrointestinal	detailed exam	  · GI Normal	soft; nontender; bowel sounds normal; generalized anasarca	  · Gastrointestinal Comments	+PEG tube	  · Extremities	detailed exam	  · Extremities Details	no clubbing; no cyanosis; no pedal edema	  · Extremities Comments	VCB boots min place	  · Neurological	detailed exam	  · Neurological Details	obtunded, doesn't follow commands, non-verbal	        acetaminophen    Suspension .. 650 milliGRAM(s) Enteral Tube every 6 hours PRN  ALBUTerol    0.083% 2.5 milliGRAM(s) Nebulizer every 6 hours  ALBUTerol    0.083% 10 milliGRAM(s) Nebulizer once  bromocriptine Tablet 5 milliGRAM(s) Oral <User Schedule>  chlorhexidine 2% Cloths 1 Application(s) Topical daily  doxazosin 1 milliGRAM(s) Oral at bedtime  enoxaparin Injectable 40 milliGRAM(s) SubCutaneous daily  levothyroxine Injectable 25 MICROGram(s) IV Push at bedtime  melatonin 3 milliGRAM(s) Oral at bedtime  pantoprazole  Injectable 40 milliGRAM(s) IV Push daily  piperacillin/tazobactam IVPB.. 3.375 Gram(s) IV Intermittent every 8 hours  primidone 50 milliGRAM(s) Oral two times a day  propranolol 20 milliGRAM(s) Oral every 6 hours  saccharomyces boulardii 250 milliGRAM(s) Oral two times a day  scopolamine   Patch 1 Patch Transdermal every 72 hours      LABS:                          12.0   13.90 )-----------( 323      ( 12 Oct 2019 06:27 )             35.8     10-12    129<L>  |  95<L>  |  34.0<H>  ----------------------------<  141<H>  5.5<H>   |  24.0  |  0.83    Ca    9.0      12 Oct 2019 06:27        PT/INR - ( 11 Oct 2019 19:31 )   PT: 12.6 sec;   INR: 1.09 ratio               Urinalysis Basic - ( 11 Oct 2019 19:09 )    Color: Yellow / Appearance: Clear / S.010 / pH: x  Gluc: x / Ketone: Negative  / Bili: Negative / Urobili: 4 mg/dL   Blood: x / Protein: 30 mg/dL / Nitrite: Negative   Leuk Esterase: Negative / RBC: 0-2 /HPF / WBC Negative   Sq Epi: x / Non Sq Epi: x / Bacteria: x      CAPILLARY BLOOD GLUCOSE      POCT Blood Glucose.: 155 mg/dL (12 Oct 2019 11:05)  POCT Blood Glucose.: 117 mg/dL (11 Oct 2019 23:33)  POCT Blood Glucose.: 129 mg/dL (11 Oct 2019 16:37)      RADIOLOGY & ADDITIONAL TESTS:      Consultant notes reviewed    Case discussed with consultant/provider/ family /patient

## 2019-10-12 NOTE — PROGRESS NOTE ADULT - ASSESSMENT
83 y/o M with hx of HTN, BPH and hypothyroid transferred to [facility] from [facility] for intracranial hemorrhage management and neurosurgical eval. Pt was intubated for transfer. Noted at [facility] with acute large left basal ganglia hemorrhage with intraventricular extension. Upon arrival had emergent EVD placement which was done at the bedside in the ICU. ICH likely secondary to htn emergency, pt was placed on bp control. Also noted with acute respiratory failure with hypoxia, excess secretions and suspected HCAPNA empirically remained on vanco/ cefepime and inhaled tobramycin which was discontinued by ID today.  Pt was able to be extubated on 10/1. Remains with excess secretions and with low grade fevers tmax: 100.8 unclear if related to current infection/ central fevers. S/p R EVD removal 10/4. Radiographically stable on repeat CTh with clearing IVH. Neuro sx/ neuro continued to follow the pt. Pt continues to be unresponsive, minimally responding to commands, remains with NGT in place for feeding, unable to clear his own secretions at times. Continues on scopolamine and suctioning frequently. Overall guarded prognosis, palliative care team continues to follow the patient. Family has agreed to DNR/DNI but to continue with current care. Ongoing discussions with the family in regards to the tx plan, pt would benefit from hospice intervention, family has the patient's grandson wedding on 10/20/19 and they do not want to make any decisions until then. Palliative Care team is following patient.     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  -s/p placement and removal of EVD  - remains obtunded with no meaningful recovery noted   - stable repeat ct head   - As per neuro sx no need for repeat ct head unless further decline or change in mental status  - c/w neurochecks per routine   - hold PEG medications/ feeding   - remains with dysphagia/ unresponsiveness  - pt unable to clear secretions  - c/w scopolamine and frequent suctioning   - Noted pulmonary toilet  - will repeat cxr and see if fluid overloaded- possible lasix prn if needed   - montejo to remain in place   - frequent repositioning   - Chest PT  - neuro f/u noted and appreciated   - neuro sx f/u noted and appreciated   - palliative care f/u noted and appreciated- daughter Maral stating that the family will not make a decision for [facility] until the grandson's wedding 10/20/19. Extensive ongoing discussions with the patients wife and family, discussing patients quality of life and wishes. Which wife continues to state that the pt would not have wanted to live like this. Emotional support continues to be provided     Hyperkalemia: -order BMP repeat K= 5.5- still high - given Kayexalate 30 once today  Monitor    Fever unclear etiology  - s/p abx tx for HCAPNA/ aspiration PNA, clinical concern that low grade fevers are central   - afebrile   - tmax: 99.7   - leukocytosis likely reactive  /WBC=10.10  - d/c all abx per ID on 10/8   -ID consult noted and appreciated 	    Seizure   - c/w primidone ngt qhs     Hypothyroid  - tsh wnl   - c/w synthroid 25mcg IV QHS      DVT ppx  - c/w lovenox sq qd   - scd boots b/l    Activity level: Bedbound     Advanced Directive: DNR DNI   Niece updated at bedside.   Daughter per previous conversations adamant that no decision be made until after her sons wedding.   D/w her and patients wife that although the wedding is important to the family, it is far more important to honor and respect their fathers wishes.   They understand this but are having a hard time coming to terms with everything especially bc the wedding is on 10/20.   Daughter did request hospice information, more so for bereavement support for her mother.       Dispo: Guarded prognosis, would benefit from hospice intervention. Ongoing discussions with the family, hospitalist team and palliative care team, in regards to the plan of care. Family wants to wait until grandson's wedding 10/20/19 to make any decisions regarding [facility]. Pt has not made any clinical improvement and family has been informed that pt is at high risk of aspiration w ngt feedings as patient has demonstrated inability to clear secretions.     10/11-Pt. spiked fever 102.6 rectally, desat to 86% on NC 5L/min, - started on Zosyn for   Possibility of aspiration of feeds and secretions explained to the wife. Placed on VM with O2 sat came up to above 93%.

## 2019-10-12 NOTE — PROGRESS NOTE ADULT - SUBJECTIVE AND OBJECTIVE BOX
[facility]                                        Neu[facility] at [place]                                 Frieda Saunders, & Jose                                  [address]                                        [place], [place], [address]                                             [phone]        CC: intracranial hemorrhage     HPI:   The patient is a 84y Male who presented as a transfer to [facility] for management of ICH.  He apparently may have had seizure and right sided weakness and was brought to [facility].  The patient was transferred to [facility] for intracranial hemorrhage management and neurosurgical eval.  He was intubated for transfer.  An extraventricular drain was placed. This was ultimately removed.    Interim history:  Now on 3 [place]   Remains lethargic.     ROS:   Unobtainable due to patient's condition.     MEDICATIONS  (STANDING):  ALBUTerol    0.083% 2.5 milliGRAM(s) Nebulizer every 6 hours  ALBUTerol    0.083% 10 milliGRAM(s) Nebulizer once  bromocriptine Tablet 5 milliGRAM(s) Oral <User Schedule>  chlorhexidine 2% Cloths 1 Application(s) Topical daily  doxazosin 1 milliGRAM(s) Oral at bedtime  enoxaparin Injectable 40 milliGRAM(s) SubCutaneous daily  levothyroxine Injectable 25 MICROGram(s) IV Push at bedtime  melatonin 3 milliGRAM(s) Oral at bedtime  pantoprazole  Injectable 40 milliGRAM(s) IV Push daily  piperacillin/tazobactam IVPB.. 3.375 Gram(s) IV Intermittent every 8 hours  primidone 50 milliGRAM(s) Oral two times a day  propranolol 20 milliGRAM(s) Oral every 6 hours  saccharomyces boulardii 250 milliGRAM(s) Oral two times a day  scopolamine   Patch 1 Patch Transdermal every 72 hours      Vital Signs Last 24 Hrs  T(C): 36.9 (12 Oct 2019 09:00), Max: 39.2 (11 Oct 2019 16:29)  T(F): 98.4 (12 Oct 2019 09:00), Max: 102.6 (11 Oct 2019 16:29)  HR: 79 (12 Oct 2019 09:56) (67 - 112)  BP: 130/65 (12 Oct 2019 09:00) (126/55 - 153/67)  RR: 18 (12 Oct 2019 09:00) (18 - 22)  SpO2: 94% (12 Oct 2019 09:56) (92% - 100%)    Detailed Neurologic Exam:    Mental status: The patient is sleepy and no longer arouses to voice. He does not follow instructions. Unable to assess orientation or language.    Cranial nerves: Pupils unable to assess, forcefully keeps eyelids shut. Extraocular motion is difficult to assess as he does not open eyes/keep eyes open. Facial musculature is asymmetric with central right weakness. Palate and tongue are difficult to evaluate.     Motor: There is increased tone on the right.  There is no tremor.  Moving right minimally (2/5) to stimuli.  Moving left spontaneously and to stimuli.    Sensory: grimace to pinch x 4 ext    Reflexes: diminished throughout and plantar responses are flexor left, extensor right.    Cerebellar: Cannot be assessed.     Labs:     10-12    129<L>  |  95<L>  |  34.0<H>  ----------------------------<  141<H>  5.5<H>   |  24.0  |  0.83    Ca    9.0      12 Oct 2019 06:27                              12.0   13.90 )-----------( 323      ( 12 Oct 2019 06:27 )             35.8

## 2019-10-13 LAB
BASOPHILS # BLD AUTO: 0.08 K/UL — SIGNIFICANT CHANGE UP (ref 0–0.2)
BASOPHILS NFR BLD AUTO: 0.7 % — SIGNIFICANT CHANGE UP (ref 0–2)
EOSINOPHIL # BLD AUTO: 0.33 K/UL — SIGNIFICANT CHANGE UP (ref 0–0.5)
EOSINOPHIL NFR BLD AUTO: 3.1 % — SIGNIFICANT CHANGE UP (ref 0–6)
GLUCOSE BLDC GLUCOMTR-MCNC: 110 MG/DL — HIGH (ref 70–99)
GLUCOSE BLDC GLUCOMTR-MCNC: 113 MG/DL — HIGH (ref 70–99)
GLUCOSE BLDC GLUCOMTR-MCNC: 116 MG/DL — HIGH (ref 70–99)
GLUCOSE BLDC GLUCOMTR-MCNC: 126 MG/DL — HIGH (ref 70–99)
GLUCOSE BLDC GLUCOMTR-MCNC: 145 MG/DL — HIGH (ref 70–99)
HCT VFR BLD CALC: 35.1 % — LOW (ref 39–50)
HGB BLD-MCNC: 11.4 G/DL — LOW (ref 13–17)
IMM GRANULOCYTES NFR BLD AUTO: 0.7 % — SIGNIFICANT CHANGE UP (ref 0–1.5)
LYMPHOCYTES # BLD AUTO: 1.17 K/UL — SIGNIFICANT CHANGE UP (ref 1–3.3)
LYMPHOCYTES # BLD AUTO: 10.9 % — LOW (ref 13–44)
MCHC RBC-ENTMCNC: 31.4 PG — SIGNIFICANT CHANGE UP (ref 27–34)
MCHC RBC-ENTMCNC: 32.5 GM/DL — SIGNIFICANT CHANGE UP (ref 32–36)
MCV RBC AUTO: 96.7 FL — SIGNIFICANT CHANGE UP (ref 80–100)
MONOCYTES # BLD AUTO: 1 K/UL — HIGH (ref 0–0.9)
MONOCYTES NFR BLD AUTO: 9.3 % — SIGNIFICANT CHANGE UP (ref 2–14)
NEUTROPHILS # BLD AUTO: 8.09 K/UL — HIGH (ref 1.8–7.4)
NEUTROPHILS NFR BLD AUTO: 75.3 % — SIGNIFICANT CHANGE UP (ref 43–77)
PLATELET # BLD AUTO: 339 K/UL — SIGNIFICANT CHANGE UP (ref 150–400)
RBC # BLD: 3.63 M/UL — LOW (ref 4.2–5.8)
RBC # FLD: 14.1 % — SIGNIFICANT CHANGE UP (ref 10.3–14.5)
WBC # BLD: 10.75 K/UL — HIGH (ref 3.8–10.5)
WBC # FLD AUTO: 10.75 K/UL — HIGH (ref 3.8–10.5)

## 2019-10-13 PROCEDURE — 99233 SBSQ HOSP IP/OBS HIGH 50: CPT

## 2019-10-13 RX ORDER — SODIUM CHLORIDE 9 MG/ML
1000 INJECTION, SOLUTION INTRAVENOUS
Refills: 0 | Status: DISCONTINUED | OUTPATIENT
Start: 2019-10-13 | End: 2019-10-14

## 2019-10-13 RX ADMIN — SCOPALAMINE 1 PATCH: 1 PATCH, EXTENDED RELEASE TRANSDERMAL at 09:34

## 2019-10-13 RX ADMIN — PRIMIDONE 50 MILLIGRAM(S): 250 TABLET ORAL at 05:17

## 2019-10-13 RX ADMIN — Medication 25 MICROGRAM(S): at 23:16

## 2019-10-13 RX ADMIN — PIPERACILLIN AND TAZOBACTAM 25 GRAM(S): 4; .5 INJECTION, POWDER, LYOPHILIZED, FOR SOLUTION INTRAVENOUS at 15:08

## 2019-10-13 RX ADMIN — ALBUTEROL 2.5 MILLIGRAM(S): 90 AEROSOL, METERED ORAL at 08:19

## 2019-10-13 RX ADMIN — BROMOCRIPTINE MESYLATE 5 MILLIGRAM(S): 5 CAPSULE ORAL at 12:36

## 2019-10-13 RX ADMIN — ENOXAPARIN SODIUM 40 MILLIGRAM(S): 100 INJECTION SUBCUTANEOUS at 12:37

## 2019-10-13 RX ADMIN — BROMOCRIPTINE MESYLATE 5 MILLIGRAM(S): 5 CAPSULE ORAL at 05:17

## 2019-10-13 RX ADMIN — ALBUTEROL 2.5 MILLIGRAM(S): 90 AEROSOL, METERED ORAL at 21:33

## 2019-10-13 RX ADMIN — PIPERACILLIN AND TAZOBACTAM 25 GRAM(S): 4; .5 INJECTION, POWDER, LYOPHILIZED, FOR SOLUTION INTRAVENOUS at 05:17

## 2019-10-13 RX ADMIN — ALBUTEROL 2.5 MILLIGRAM(S): 90 AEROSOL, METERED ORAL at 15:28

## 2019-10-13 RX ADMIN — ALBUTEROL 2.5 MILLIGRAM(S): 90 AEROSOL, METERED ORAL at 03:38

## 2019-10-13 RX ADMIN — Medication 250 MILLIGRAM(S): at 05:17

## 2019-10-13 RX ADMIN — PIPERACILLIN AND TAZOBACTAM 25 GRAM(S): 4; .5 INJECTION, POWDER, LYOPHILIZED, FOR SOLUTION INTRAVENOUS at 22:16

## 2019-10-13 RX ADMIN — SODIUM CHLORIDE 45 MILLILITER(S): 9 INJECTION, SOLUTION INTRAVENOUS at 17:55

## 2019-10-13 RX ADMIN — PANTOPRAZOLE SODIUM 40 MILLIGRAM(S): 20 TABLET, DELAYED RELEASE ORAL at 12:36

## 2019-10-13 RX ADMIN — SCOPALAMINE 1 PATCH: 1 PATCH, EXTENDED RELEASE TRANSDERMAL at 18:00

## 2019-10-13 RX ADMIN — CHLORHEXIDINE GLUCONATE 1 APPLICATION(S): 213 SOLUTION TOPICAL at 12:36

## 2019-10-13 NOTE — PROGRESS NOTE ADULT - ASSESSMENT
83 y/o M with hx of HTN, BPH and hypothyroid transferred to [facility] [place] [facility] [place] hemorrhage management and neurosurgical eval. Pt was intubated for transfer. Noted at [facility] with acute large left basal ganglia hemorrhage with intraventricular extension. Upon arrival had emergent EVD placement which was done at the bedside in the ICU. ICH likely secondary to htn emergency, pt was placed on bp control. Also noted with acute respiratory failure with hypoxia, excess secretions and suspected HCAPNA empirically remained on vanco/ cefepime and inhaled tobramycin which was discontinued by ID today.  Pt was able to be extubated on 10/1. Remains with excess secretions and with low grade fevers tmax: 100.8 unclear if related to current infection/ central fevers. S/p R EVD removal 10/4. Radiographically stable on repeat CTh with clearing IVH. Neuro sx/ neuro continued to follow the pt. Pt continues to be unresponsive, minimally responding to commands, remains with NGT in place for feeding, unable to clear his own secretions at times. Continues on scopolamine and suctioning frequently. Overall guarded prognosis, palliative care team continues to follow the patient. Family has agreed to DNR/DNI but to continue with current care. Ongoing discussions with the family in regards to the tx plan, pt would benefit from hospice intervention, family has the patient's grandson wedding on 10/20/19 and they do not want to make any decisions until then. Palliative Care team is following patient.     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  -s/p placement and removal of EVD  - remains obtunded with no meaningful recovery noted   - stable repeat ct head   - As per neuro sx no need for repeat ct head unless further decline or change in mental status  - c/w neurochecks per routine   - hold PEG medications/ feeding   - remains with dysphagia/ unresponsiveness  - pt unable to clear secretions  - c/w scopolamine and frequent suctioning   - Noted pulmonary toilet  - will repeat cxr and see if fluid overloaded- possible lasix prn if needed   - montejo to remain in place   - frequent repositioning   - Chest PT  - neuro f/u noted and appreciated   - neuro sx f/u noted and appreciated   - palliative care f/u noted and appreciated- daughter Maral stating that the family will not make a decision for [facility] until the grandson's wedding 10/20/19. Extensive ongoing discussions with the patients wife and family, discussing patients quality of life and wishes. Which wife continues to state that the pt would not have wanted to live like this. Emotional support provided     Hyperkalemia: -order BMP repeat K= 5.5- still high - given Kayexalate 30 once today  Monitor    Fever unclear etiology  - s/p abx tx for HCAPNA/ aspiration PNA, clinical concern that low grade fevers are central   - afebrile  tmax: 99.7  - leukocytosis likely reactive  /WBC=10.10  - d/c all abx per ID on 10/8   -ID consult noted and appreciated 	    Seizure   - c/w primidone ngt qhs     Hypothyroid  - tsh wnl   - c/w synthroid 25mcg IV QHS      DVT ppx  - c/w Lovenox sq qd   - scd boots b/l    Activity level: Bedbound     Advanced Directive: DNR DNI   Niece updated at bedside.

## 2019-10-13 NOTE — PROGRESS NOTE ADULT - SUBJECTIVE AND OBJECTIVE BOX
HANNAH HERNANDEZ Patient is a 84y old  Male who presents with a chief complaint of ICH (12 Oct 2019 14:03)     HPI:  Unable to obtain full hx and pt intubated and AMS.    83 y/o M with reported hx of HTN and hypothyroid transferred to [facility] for intracranial hemorrhage management and neurosurgical eval.  Intubated for transfer.  EVD placed. (27 Sep 2019 17:30)    The patient was seen and evaluated sleeping snoring not answering   The patient is in no acute distress.        I&O's Summary    12 Oct 2019 07:01  -  13 Oct 2019 07:00  --------------------------------------------------------  IN: 1220 mL / OUT: 1600 mL / NET: -380 mL      Allergies    No Known Allergies    Intolerances      HEALTH ISSUES - PROBLEM Dx:  Fever: Fever  HTN (hypertension): HTN (hypertension)  Dysphagia: Dysphagia  Aspiration pneumonia, unspecified aspiration pneumonia type, unspecified laterality, unspecified part of lung: Aspiration pneumonia, unspecified aspiration pneumonia type, unspecified laterality, unspecified part of lung  Advance care planning: Advance care planning  Pneumonia: Pneumonia  Encounter for palliative care: Encounter for palliative care  Basal ganglia hemorrhage: Basal ganglia hemorrhage  Hypothyroid: Hypothyroid  Acute respiratory failure with hypoxia: Acute respiratory failure with hypoxia  Hypertensive emergency: Hypertensive emergency  Intraventricular hemorrhage: Intraventricular hemorrhage  Hemorrhagic stroke: Hemorrhagic stroke  Intracranial bleed: Intracranial bleed        PAST MEDICAL & SURGICAL HISTORY:  Hypothyroid  HTN (hypertension)          Vital Signs Last 24 Hrs  T(C): 36.7 (13 Oct 2019 12:26), Max: 36.7 (13 Oct 2019 12:26)  T(F): 98.1 (13 Oct 2019 12:26), Max: 98.1 (13 Oct 2019 12:26)  HR: 90 (13 Oct 2019 12:26) (72 - 90)  BP: 145/72 (13 Oct 2019 12:26) (145/72 - 150/71)  BP(mean): --  RR: 18 (13 Oct 2019 12:26) (18 - 20)  SpO2: 98% (13 Oct 2019 12:26) (94% - 98%)T(C): 36.7 (10-13-19 @ 12:26), Max: 36.7 (10-13-19 @ 12:26)  HR: 90 (10-13-19 @ 12:26) (72 - 90)  BP: 145/72 (10-13-19 @ 12:26) (145/72 - 150/71)  RR: 18 (10-13-19 @ 12:26) (18 - 20)  SpO2: 98% (10-13-19 @ 12:26) (94% - 98%)  Wt(kg): --    PHYSICAL EXAM:    GENERAL: NAD, frail elderly   HEAD:  Atraumatic, Normocephalic  EYES: EOMI, PERRL, conjunctiva and sclera clear  NERVOUS SYSTEM:  Alert & Oriented X3,  Moves upper and lower extremities; CNS-II-XII  CHEST/LUNG: Clear to auscultation bilaterally; No rales, rhonchi, wheezing,   HEART: Regular rate and rhythm; No murmurs,   ABDOMEN: Soft, Nontender, Nondistended; Bowel sounds present  EXTREMITIES:  Peripheral Pulses, No  cyanosis, or edema    psychiatry- mood and affect approprite, Insight and judgement intact     acetaminophen    Suspension .. 650 milliGRAM(s) Enteral Tube every 6 hours PRN  ALBUTerol    0.083% 2.5 milliGRAM(s) Nebulizer every 6 hours  ALBUTerol    0.083% 10 milliGRAM(s) Nebulizer once  bromocriptine Tablet 5 milliGRAM(s) Oral <User Schedule>  chlorhexidine 2% Cloths 1 Application(s) Topical daily  dextrose 5% + sodium chloride 0.9%. 1000 milliLiter(s) IV Continuous <Continuous>  doxazosin 1 milliGRAM(s) Oral at bedtime  enoxaparin Injectable 40 milliGRAM(s) SubCutaneous daily  levothyroxine Injectable 25 MICROGram(s) IV Push at bedtime  melatonin 3 milliGRAM(s) Oral at bedtime  pantoprazole  Injectable 40 milliGRAM(s) IV Push daily  piperacillin/tazobactam IVPB.. 3.375 Gram(s) IV Intermittent every 8 hours  primidone 50 milliGRAM(s) Oral two times a day  propranolol 20 milliGRAM(s) Oral every 6 hours  saccharomyces boulardii 250 milliGRAM(s) Oral two times a day  scopolamine   Patch 1 Patch Transdermal every 72 hours      LABS:                          11.4   10.75 )-----------( 339      ( 13 Oct 2019 09:05 )             35.1     10-12    129<L>  |  95<L>  |  34.0<H>  ----------------------------<  141<H>  5.5<H>   |  24.0  |  0.83    Ca    9.0      12 Oct 2019 06:27        PT/INR - ( 11 Oct 2019 19:31 )   PT: 12.6 sec;   INR: 1.09 ratio               Urinalysis Basic - ( 11 Oct 2019 19:09 )    Color: Yellow / Appearance: Clear / S.010 / pH: x  Gluc: x / Ketone: Negative  / Bili: Negative / Urobili: 4 mg/dL   Blood: x / Protein: 30 mg/dL / Nitrite: Negative   Leuk Esterase: Negative / RBC: 0-2 /HPF / WBC Negative   Sq Epi: x / Non Sq Epi: x / Bacteria: x      CAPILLARY BLOOD GLUCOSE      POCT Blood Glucose.: 110 mg/dL (13 Oct 2019 15:12)  POCT Blood Glucose.: 145 mg/dL (13 Oct 2019 05:36)  POCT Blood Glucose.: 116 mg/dL (13 Oct 2019 00:07)  POCT Blood Glucose.: 136 mg/dL (12 Oct 2019 17:16)      RADIOLOGY & ADDITIONAL TESTS:      Consultant notes reviewed    Case discussed with consultant/provider/ family /patient

## 2019-10-14 DIAGNOSIS — Z51.5 ENCOUNTER FOR PALLIATIVE CARE: ICD-10-CM

## 2019-10-14 DIAGNOSIS — R06.00 DYSPNEA, UNSPECIFIED: ICD-10-CM

## 2019-10-14 DIAGNOSIS — R53.81 OTHER MALAISE: ICD-10-CM

## 2019-10-14 LAB — GLUCOSE BLDC GLUCOMTR-MCNC: 111 MG/DL — HIGH (ref 70–99)

## 2019-10-14 PROCEDURE — 99497 ADVNCD CARE PLAN 30 MIN: CPT | Mod: 25

## 2019-10-14 PROCEDURE — 99233 SBSQ HOSP IP/OBS HIGH 50: CPT

## 2019-10-14 RX ORDER — IPRATROPIUM/ALBUTEROL SULFATE 18-103MCG
3 AEROSOL WITH ADAPTER (GRAM) INHALATION ONCE
Refills: 0 | Status: COMPLETED | OUTPATIENT
Start: 2019-10-14 | End: 2019-10-14

## 2019-10-14 RX ORDER — MORPHINE SULFATE 50 MG/1
4 CAPSULE, EXTENDED RELEASE ORAL
Qty: 100 | Refills: 0 | Status: DISCONTINUED | OUTPATIENT
Start: 2019-10-14 | End: 2019-10-15

## 2019-10-14 RX ORDER — MORPHINE SULFATE 50 MG/1
2 CAPSULE, EXTENDED RELEASE ORAL ONCE
Refills: 0 | Status: DISCONTINUED | OUTPATIENT
Start: 2019-10-14 | End: 2019-10-14

## 2019-10-14 RX ORDER — METOPROLOL TARTRATE 50 MG
2.5 TABLET ORAL EVERY 12 HOURS
Refills: 0 | Status: DISCONTINUED | OUTPATIENT
Start: 2019-10-14 | End: 2019-10-15

## 2019-10-14 RX ORDER — ROBINUL 0.2 MG/ML
0.2 INJECTION INTRAMUSCULAR; INTRAVENOUS EVERY 6 HOURS
Refills: 0 | Status: DISCONTINUED | OUTPATIENT
Start: 2019-10-14 | End: 2019-10-15

## 2019-10-14 RX ORDER — FUROSEMIDE 40 MG
20 TABLET ORAL ONCE
Refills: 0 | Status: COMPLETED | OUTPATIENT
Start: 2019-10-14 | End: 2019-10-14

## 2019-10-14 RX ADMIN — MORPHINE SULFATE 4 MG/HR: 50 CAPSULE, EXTENDED RELEASE ORAL at 16:42

## 2019-10-14 RX ADMIN — MORPHINE SULFATE 4 MG/HR: 50 CAPSULE, EXTENDED RELEASE ORAL at 16:35

## 2019-10-14 RX ADMIN — MORPHINE SULFATE 2 MILLIGRAM(S): 50 CAPSULE, EXTENDED RELEASE ORAL at 14:29

## 2019-10-14 RX ADMIN — ALBUTEROL 2.5 MILLIGRAM(S): 90 AEROSOL, METERED ORAL at 08:41

## 2019-10-14 RX ADMIN — SCOPALAMINE 1 PATCH: 1 PATCH, EXTENDED RELEASE TRANSDERMAL at 08:00

## 2019-10-14 RX ADMIN — Medication 3 MILLILITER(S): at 13:01

## 2019-10-14 RX ADMIN — SCOPALAMINE 1 PATCH: 1 PATCH, EXTENDED RELEASE TRANSDERMAL at 19:27

## 2019-10-14 RX ADMIN — ENOXAPARIN SODIUM 40 MILLIGRAM(S): 100 INJECTION SUBCUTANEOUS at 12:42

## 2019-10-14 RX ADMIN — MORPHINE SULFATE 2 MILLIGRAM(S): 50 CAPSULE, EXTENDED RELEASE ORAL at 14:39

## 2019-10-14 RX ADMIN — Medication 2.5 MILLIGRAM(S): at 17:08

## 2019-10-14 RX ADMIN — Medication 25 MICROGRAM(S): at 21:12

## 2019-10-14 RX ADMIN — Medication 2 MILLIGRAM(S): at 15:37

## 2019-10-14 RX ADMIN — PIPERACILLIN AND TAZOBACTAM 25 GRAM(S): 4; .5 INJECTION, POWDER, LYOPHILIZED, FOR SOLUTION INTRAVENOUS at 05:26

## 2019-10-14 RX ADMIN — ALBUTEROL 2.5 MILLIGRAM(S): 90 AEROSOL, METERED ORAL at 03:40

## 2019-10-14 RX ADMIN — CHLORHEXIDINE GLUCONATE 1 APPLICATION(S): 213 SOLUTION TOPICAL at 12:42

## 2019-10-14 RX ADMIN — Medication 2 MILLIGRAM(S): at 23:19

## 2019-10-14 RX ADMIN — Medication 2 MILLIGRAM(S): at 17:08

## 2019-10-14 RX ADMIN — Medication 2 MILLIGRAM(S): at 21:13

## 2019-10-14 RX ADMIN — PIPERACILLIN AND TAZOBACTAM 25 GRAM(S): 4; .5 INJECTION, POWDER, LYOPHILIZED, FOR SOLUTION INTRAVENOUS at 13:13

## 2019-10-14 RX ADMIN — Medication 20 MILLIGRAM(S): at 12:41

## 2019-10-14 RX ADMIN — PANTOPRAZOLE SODIUM 40 MILLIGRAM(S): 20 TABLET, DELAYED RELEASE ORAL at 12:43

## 2019-10-14 NOTE — PROGRESS NOTE ADULT - ASSESSMENT
This is an 84 year old male admitted with left basal ganglia hemorrhage with intraventricular extensions - unresponsive to verbal stimuli, now exhibiting signs/symptoms of dyspnea/tachypnea/ and excessive secretions despite scopalamine patch -  see palliative encounter below for family meeting details

## 2019-10-14 NOTE — PROGRESS NOTE ADULT - PROBLEM SELECTOR PLAN 4
-Met with patient, daughters, wife, at bedside along with myself and Jenn Malik NP of 3 T  - patient is symptomatic - displaying dyspnea specifically tachypneic with excessive secretions involving accessory muscles and has audible rhonchi   - Discussed plans moving forward - discussed comfort measures in detail - discussed the side effect of medications that can be involved in comfort medications including hastening the timeline of life, decreasing HR/breathing rate/BP but also included the benefits such as comfort and relief of pain and dyspnea. Family is addement at this time about having patient not be on comfort measures - family is not ready for the decision. Family agreeable to starting Robinul IV to help with secretions and to continue with Scopalamine patch. Family doesn't want morphine or dilaudid at this time. Family states that they understand this is ultimately up to "God the maker to decide" and that they "Don't want him to suffer" but that they want to try to medically optimize for the time being. Helena MICHELLE adding lasix, portable cxr and venti mask at this time (family requesting mouth mask instead of nasal cannula)   -Dr. Treviño at bedside now assessing patient for further work up  -Patient remains DNR DNI MOLST   -Will continue to support and monitor    Total time spent 40 minutes    COUNSELING:  Face to face meeting to discuss Advanced Care Planning - Time Spent ___20___Minutes.  See goals of care note.      Thank you for the opportunity to assist with the care of this patient.   [facility] [phone]. -Met with patient, daughters, wife, at bedside along with myself and Jenn Malik NP of 3 T (Daughter Maral and SHANIA wife making core decisions during todays meeting - Shania states she is the wife and HCP)  - patient is symptomatic - displaying dyspnea specifically tachypneic with excessive secretions involving accessory muscles and has audible rhonchi   - Discussed plans moving forward - discussed comfort measures in detail - discussed the side effect of medications that can be involved in comfort medications including hastening the timeline of life, decreasing HR/breathing rate/BP but also included the benefits such as comfort and relief of pain and dyspnea. Family is addement at this time about having patient not be on comfort measures - family is not ready for the decision. Family agreeable to starting Robinul IV to help with secretions and to continue with Scopalamine patch. Family doesn't want morphine or dilaudid at this time. Family states that they understand this is ultimately up to "God the maker to decide" and that they "Don't want him to suffer" but that they want to try to medically optimize for the time being. Helena NP adding lasix, portable cxr and venti mask at this time (family requesting mouth mask instead of nasal cannula)   -Dr. Treviño at bedside now assessing patient for further work up  -Patient remains DNR DNI MOLST   -Will continue to support and monitor    Total time spent 40 minutes    COUNSELING:  Face to face meeting to discuss Advanced Care Planning - Time Spent ___20___Minutes.  See goals of care note.      Thank you for the opportunity to assist with the care of this patient.   [facility] [phone]. -Met with patient, daughters, wife, at bedside along with myself and Jenn Malik NP of 3 T (Daughter Maral and SHANIA wife making core decisions during todays meeting - Shania states she is the wife and HCP)  - patient is symptomatic - displaying dyspnea specifically tachypneic with excessive secretions involving accessory muscles and has audible rhonchi   - Discussed plans moving forward - discussed comfort measures in detail - discussed the side effect of medications that can be involved in comfort medications including hastening the timeline of life, decreasing HR/breathing rate/BP but also included the benefits such as comfort and relief of pain and dyspnea. Family is addement at this time about having patient not be on comfort measures - family is not ready for the decision. Family agreeable to starting Robinul IV to help with secretions and to continue with Scopalamine patch. Family doesn't want morphine or dilaudid at this time. Family states that they understand this is ultimately up to "God the maker to decide" and that they "Don't want him to suffer" but that they want to try to medically optimize for the time being. Helena MICHELLE adding lasix, portable cxr and venti mask at this time (family requesting mouth mask instead of nasal cannula) Discussed with family that patient is at a high risk for decline and that it would be advisable to discuss plan if/when he declines since he is at a high risk for even further decline  -Dr. Treviño at bedside now assessing patient for further work up  -Patient remains DNR DNI MOLST   -Will continue to support and monitor    Total time spent 40 minutes    COUNSELING:  Face to face meeting to discuss Advanced Care Planning - Time Spent ___20___Minutes.  See goals of care note.      Thank you for the opportunity to assist with the care of this patient.   [facility] [phone]. -Met with patient, daughters, wife, at bedside along with myself and Jenn Malik NP of 3 T (Daughter Maral and SHANIA wife making core decisions during todays meeting - Shania states she is the wife and HCP)  - patient is symptomatic - displaying dyspnea specifically tachypneic with excessive secretions involving accessory muscles and has audible rhonchi   - Discussed plans moving forward - discussed comfort measures in detail - discussed the side effect of medications that can be involved in comfort medications including hastening the timeline of life, decreasing HR/breathing rate/BP but also included the benefits such as comfort and relief of pain and dyspnea. Family is addement at this time about having patient not be on comfort measures - family is not ready for the decision. Family agreeable to starting Robinul IV to help with secretions and to continue with Scopalamine patch. Family doesn't want morphine or dilaudid at this time. Family states that they understand this is ultimately up to "God the maker to decide" and that they "Don't want him to suffer" but that they want to try to medically optimize for the time being. Helena MICHELLE adding lasix, portable cxr and venti mask at this time (family requesting mouth mask instead of nasal cannula) Discussed with family that patient is at a high risk for decline and that it would be advisable to discuss plan if/when he declines since he is at a high risk for even further deterioration  -Dr. Treviño at bedside now assessing patient for further work up  -Patient remains DNR DNI MOLST   -Will continue to support and monitor    Total time spent 40 minutes    COUNSELING:  Face to face meeting to discuss Advanced Care Planning - Time Spent ___20___Minutes.  See goals of care note.      Thank you for the opportunity to assist with the care of this patient.   [facility] [phone].

## 2019-10-14 NOTE — GOALS OF CARE CONVERSATION - ADVANCED CARE PLANNING - CONVERSATION DETAILS
Family meeting held again at 3 PM  Family now requesting Comfort measures  Discussed side effects associated with comfort medications including decreased HR, BP, RR - Family expresses understanding and wants to move forward with comfort medications and care  At this time will add Morphine infusion along with ativan ATC in addition to the already existing Robinul and scopolamine patch  Family is supportive and accepting of his terminal illness - they feel its "in God's hands"  Will continue to support and monitor    COUNSELING:  Face to face meeting to discuss Advanced Care Planning - Time Spent ___30___Minutes.  See goals of care note.      Thank you for the opportunity to assist with the care of this patient.   [facility] [phone].
discussed with patient's s daughter  and wife and granddaughter (and her  both are physicians ) agree to hospice care and agree that he doesn't need NGT tube feeds anymore - request some IVF to continue- patient's grandson is getting  maggi week and the family doesn't want to take him home till then, he would not want his grandson to not enjoy his wedding as per patient's wife and HCP,
discussed with wife and daughter at bedside , they do want the patient to be alive till  if possible but don't want him to be uncomfortable, they request no morphine to be given and don't want any aggressive measures, no artificial feeds, they are aware of extremely guarded prognosis and know that he can die any moment , they request that if he dies before  they don't want to tell his grandson and extended family but will tell the  home and will let us know
CLARENCE and palliative physician DR Rapp met with patients 2 dgts and spouse to provide ongoing support. patients dgt engaged with palliative care team and verbalize awareness that they understand patients care will need to go to Hospice but report that wife unable to get to that point yet. Dgts reviewed how patient and spouse enmeshed every day in every activity and spouse cant come to terms of end of life issues. Spouse also had strong opinion and beliefs towards palliative acre team and did not want to engage. Dgts acknowledge that wife will  need lot of support and grief programs. Sw educated that a lot of services run thru Hospice program but that information will be provided. dgts also report that they are aware that family does not wish to make any decisions until after grandsons wedding on Oct 20th. palliative will continue to follow.
Krystal and palliative physician Dr Rapp met with patients wife to offer additional support in coping with medical issues and encourage further discussions for planning. palliative care to follow.
Maral states mother not ready to make the decision for comfort. They all understand overall poor prognosis. However, there is a wedding on 10/20 and do not want to make any decisions for comfort before then.   Informed Maral high risk for decline from aspiration with respiratory compromise.  She understands and if that should happen then it would be " in God's hands" but they don't want to make decision ( for comfort) prior to that.  Other daughter Ghazal who I spoke to yesterday had stated they are all aware this is towards end of life and once the wedding is done, she will allow her son to go on his honeymoon and not inform him of grandfather's passing.
Palliative care encounter.  Plan: -Met with patient, daughters, wife, at bedside along with myself and Jenn Malik NP of 3 T (Daughter Maral and SHANIA wife making core decisions during todays meeting - Shania states she is the wife and HCP)  - patient is symptomatic - displaying dyspnea specifically tachypneic with excessive secretions involving accessory muscles and has audible rhonchi   - Discussed plans moving forward - discussed comfort measures in detail - discussed the side effect of medications that can be involved in comfort medications including hastening the timeline of life, decreasing HR/breathing rate/BP but also included the benefits such as comfort and relief of pain and dyspnea. Family is addement at this time about having patient not be on comfort measures - family is not ready for the decision. Family agreeable to starting Robinul IV to help with secretions and to continue with Scopalamine patch. Family doesn't want morphine or dilaudid at this time. Family states that they understand this is ultimately up to "God the maker to decide" and that they "Don't want him to suffer" but that they want to try to medically optimize for the time being. Helena MICHELLE adding lasix, portable cxr and venti mask at this time (family requesting mouth mask instead of nasal cannula) Discussed with family that patient is at a high risk for decline and that it would be advisable to discuss plan if/when he declines since he is at a high risk for even further deterioration  -Dr. Treviño at bedside now assessing patient for further work up  -Patient remains DNR DNI MOLST   -Will continue to support and monitor    Total time spent 40 minutes    COUNSELING:  Face to face meeting to discuss Advanced Care Planning - Time Spent ___20___Minutes.  See goals of care note.      Thank you for the opportunity to assist with the care of this patient.   [facility] [phone].

## 2019-10-14 NOTE — PROGRESS NOTE ADULT - SUBJECTIVE AND OBJECTIVE BOX
[facility]                                        Neurology at [place]                                 Frieda Saunders, & Jose                                  [address]                                        [place], [place], [address]                                             [phone]    CC: intracranial hemorrhage     HPI:   The patient is a 84y Male who presented as a transfer to [facility] for management of ICH.  He apparently may have had seizure and right sided weakness and was brought to [facility].  The patient was transferred to [facility] for intracranial hemorrhage management and neurosurgical eval.  He was intubated for transfer.  An extraventricular drain was placed. This was ultimately removed.    Interim history:  Now on 3 [place]   Remains lethargic.     ROS:   Unobtainable due to patient's condition.     MEDICATIONS  (STANDING):  ALBUTerol    0.083% 2.5 milliGRAM(s) Nebulizer every 6 hours  ALBUTerol    0.083% 10 milliGRAM(s) Nebulizer once  bromocriptine Tablet 5 milliGRAM(s) Oral <User Schedule>  chlorhexidine 2% Cloths 1 Application(s) Topical daily  doxazosin 1 milliGRAM(s) Oral at bedtime  enoxaparin Injectable 40 milliGRAM(s) SubCutaneous daily  levothyroxine Injectable 25 MICROGram(s) IV Push at bedtime  melatonin 3 milliGRAM(s) Oral at bedtime  pantoprazole  Injectable 40 milliGRAM(s) IV Push daily  piperacillin/tazobactam IVPB.. 3.375 Gram(s) IV Intermittent every 8 hours  primidone 50 milliGRAM(s) Oral two times a day  propranolol 20 milliGRAM(s) Oral every 6 hours  saccharomyces boulardii 250 milliGRAM(s) Oral two times a day  scopolamine   Patch 1 Patch Transdermal every 72 hours      Vital Signs Last 24 Hrs  T(C): 36.9 (12 Oct 2019 09:00), Max: 39.2 (11 Oct 2019 16:29)  T(F): 98.4 (12 Oct 2019 09:00), Max: 102.6 (11 Oct 2019 16:29)  HR: 79 (12 Oct 2019 09:56) (67 - 112)  BP: 130/65 (12 Oct 2019 09:00) (126/55 - 153/67)  RR: 18 (12 Oct 2019 09:00) (18 - 22)  SpO2: 94% (12 Oct 2019 09:56) (92% - 100%)    Detailed Neurologic Exam:    Mental status: The patient is sleepy arouses to tactile stimulation. He does not follow instructions. Unable to assess orientation or language.    Cranial nerves: Pupils unable to assess, forcefully keeps eyelids shut. Extraocular motion is difficult to assess as he does not open eyes/keep eyes open. Facial musculature is asymmetric with central right weakness.    Motor: There is increased tone on the right.  There is no tremor.  Moves all limbs to stimulation, left more than right    Sensory: grimace to pinch x 4 ext    Reflexes: diminished throughout and plantar responses are flexor left, extensor right.    Labs:     10-12    129<L>  |  95<L>  |  34.0<H>  ----------------------------<  141<H>  5.5<H>   |  24.0  |  0.83    Ca    9.0      12 Oct 2019 06:27                              12.0   13.90 )-----------( 323      ( 12 Oct 2019 06:27 )             35.8               Assessment and Plan:   · Assessment		  84y Male who is followed by neurology because of ICH    ICH  Exam unchanged with no improvement.  Likely hypertensive etiology  Avoid anti platelet medications.  Avoid anti coagulant medications.  Off Keppra  Repeat head CT 10/7 grossly stable basal ganglia ICH and intraventricular hemorrhage, no hydrocephalus.  Now DNR, DNI.    Hypertension   Control blood pressure.     Palliative care following.

## 2019-10-14 NOTE — PROGRESS NOTE ADULT - SUBJECTIVE AND OBJECTIVE BOX
This is a Palliative Care [facility] This is a Palliative Care [facility]    <PI:85 y/o M with reported hx of HTN and hypothyroid transferred to [facility] for intracranial hemorrhage management and neurosurgical eval.  Intubated for transfer.  EVD placed. (27 Sep 2019 17:30)>end of copied text    CC:intracranial bleed    Present Symptoms:   Dyspnea:  2-3  Nausea/Vomiting: Unable to obtain due to poor mentation   Anxiety:  Unable to obtain due to poor mentation   Depression:Unable to obtain due to poor mentation   Fatigue: Yes   Loss of appetite: Unable to obtain due to poor mentation   Constipation:Unable to obtain due to poor mentation     Pain: Unable to obtain due to poor mentation             Character-            Duration-            Effect-            Factors-            Frequency-            Location-            Severity-    Review of Systems: Reviewed  Unable to obtain due to poor mentation   Appears dyspneic    MEDICATIONS  (STANDING):  ALBUTerol    0.083% 2.5 milliGRAM(s) Nebulizer every 6 hours  ALBUTerol    0.083% 10 milliGRAM(s) Nebulizer once  ALBUTerol/ipratropium for Nebulization 3 milliLiter(s) Nebulizer once  chlorhexidine 2% Cloths 1 Application(s) Topical daily  dextrose 5% + sodium chloride 0.9%. 1000 milliLiter(s) (45 mL/Hr) IV Continuous <Continuous>  enoxaparin Injectable 40 milliGRAM(s) SubCutaneous daily  furosemide   Injectable 20 milliGRAM(s) IV Push once  levothyroxine Injectable 25 MICROGram(s) IV Push at bedtime  metoprolol tartrate Injectable 2.5 milliGRAM(s) IV Push every 12 hours  pantoprazole  Injectable 40 milliGRAM(s) IV Push daily  piperacillin/tazobactam IVPB.. 3.375 Gram(s) IV Intermittent every 8 hours  scopolamine   Patch 1 Patch Transdermal every 72 hours    MEDICATIONS  (PRN):  acetaminophen    Suspension .. 650 milliGRAM(s) Enteral Tube every 6 hours PRN Temp greater or equal to 38C (100.4F), Mild Pain (1 - 3)  glycopyrrolate Injectable 0.2 mlliGRAM(s) IV Push every 6 hours PRN Excessive Secretions    PHYSICAL EXAM:  Vital Signs Last 24 Hrs  T(C): 37 (14 Oct 2019 00:39), Max: 37 (14 Oct 2019 00:39)  T(F): 98.6 (14 Oct 2019 00:39), Max: 98.6 (14 Oct 2019 00:39)  HR: 80 (14 Oct 2019 09:06) (71 - 80)  BP: 155/70 (14 Oct 2019 00:39) (136/61 - 155/70)  BP(mean): --  RR: 18 (14 Oct 2019 00:39) (18 - 19)  SpO2: 92% (14 Oct 2019 00:39) (92% - 96%)    General: lethargic, unresponsive to verbal stimuli    HEENT: excessive secretions     Lungs: tachypneia, excessive secretions    CV: normal      GI: incontinent     : montejo    MSK: weakness bedbound generalized edema    Skin: normal     LABS:                      11.4   10.75 )-----------( 339      ( 13 Oct 2019 09:05 )             35.1     I&O's Summary  13 Oct 2019 07:01  -  14 Oct 2019 07:00  --------------------------------------------------------  IN: 0 mL / OUT: 1700 mL / NET: -1700 mL      RADIOLOGY & ADDITIONAL STUDIES:  CT head 10.07.19  IMPRESSION: Stable left basal ganglia hemorrhage. Stable   moderate intraventricular hemorrhage. Stable ventricular size. No new   hemorrhage. No hydrocephalus.     ADVANCE DIRECTIVES:   DNR DNI [facility]

## 2019-10-14 NOTE — GOALS OF CARE CONVERSATION - ADVANCED CARE PLANNING - CONVERSATION/DISCUSSION
Diagnosis/Prognosis/Holistic/Treatment Options
Diagnosis/Treatment Options/Prognosis
Prognosis/Treatment Options/Diagnosis/Palliative Care Referral
Diagnosis/Treatment Options/Prognosis/Palliative Care Referral

## 2019-10-15 VITALS
RESPIRATION RATE: 24 BRPM | OXYGEN SATURATION: 93 % | SYSTOLIC BLOOD PRESSURE: 102 MMHG | HEART RATE: 99 BPM | DIASTOLIC BLOOD PRESSURE: 63 MMHG

## 2019-10-15 DIAGNOSIS — R53.2 FUNCTIONAL QUADRIPLEGIA: ICD-10-CM

## 2019-10-15 PROCEDURE — 99233 SBSQ HOSP IP/OBS HIGH 50: CPT

## 2019-10-15 PROCEDURE — 99239 HOSP IP/OBS DSCHRG MGMT >30: CPT

## 2019-10-15 RX ADMIN — Medication 2 MILLIGRAM(S): at 00:48

## 2019-10-15 RX ADMIN — Medication 2 MILLIGRAM(S): at 02:48

## 2019-10-15 RX ADMIN — Medication 2 MILLIGRAM(S): at 10:32

## 2019-10-15 RX ADMIN — PANTOPRAZOLE SODIUM 40 MILLIGRAM(S): 20 TABLET, DELAYED RELEASE ORAL at 12:39

## 2019-10-15 RX ADMIN — Medication 2 MILLIGRAM(S): at 05:33

## 2019-10-15 RX ADMIN — Medication 2 MILLIGRAM(S): at 12:36

## 2019-10-15 RX ADMIN — MORPHINE SULFATE 4 MG/HR: 50 CAPSULE, EXTENDED RELEASE ORAL at 11:26

## 2019-10-15 RX ADMIN — MORPHINE SULFATE 4 MG/HR: 50 CAPSULE, EXTENDED RELEASE ORAL at 11:25

## 2019-10-15 NOTE — PROGRESS NOTE ADULT - PROBLEM SELECTOR PLAN 2
Patient is symptomatically displaying dyspnea - specifically tachypnea along with excessive secretions - accessory muscles being used  - Discussed with family adding Robinul IV to the medication regimen (discussed side effects included) as needed for secretions - See palliative encounter for details
Extubated but increased O2 requirement concern for PNA  Advance directives discussed with wife - intubation if worsening respiratory status?
Infection vs Central?  ID consulted
Issues of secretions causing  respiratory compromise 2/2 aspiration.   Trach/Peg has been discussed with family  Informed risks of prolonged NGT
NGT feedings  Remains at risk for aspiration  NO PEG per family
cont Morphine infusion 4mg/hr  Change Ativan to 2mg IVP q4hr ATC with q2hr PRN
cont O2 support
cont to monitor  possible central fevers
issues of secretions and subsequent PNA   O2 support  Informed family and discussed possible reintubation
more lethargic today- repeat CT ordered

## 2019-10-15 NOTE — PROGRESS NOTE ADULT - SUBJECTIVE AND OBJECTIVE BOX
HANNAH HERNANDEZ Patient is a 84y old  Male who presents with a chief complaint of ICH (15 Oct 2019 11:00)     HPI:  Unable to obtain full hx and pt intubated and AMS.    83 y/o M with reported hx of HTN and hypothyroid transferred to [facility] for intracranial hemorrhage management and neurosurgical eval.  Intubated for transfer.  EVD placed. (27 Sep 2019 17:30)    The patient was seen and evaluated sleeping resting   The patient is in no acute distress.        I&O's Summary    14 Oct 2019 07:01  -  15 Oct 2019 07:00  --------------------------------------------------------  IN: 52 mL / OUT: 1100 mL / NET: -1048 mL      Allergies    No Known Allergies    Intolerances      HEALTH ISSUES - PROBLEM Dx:  Functional quadriplegia: Functional quadriplegia  Palliative care encounter: Palliative care encounter  Debility: Debility  Dyspnea: Dyspnea  Fever: Fever  HTN (hypertension): HTN (hypertension)  Dysphagia: Dysphagia  Aspiration pneumonia, unspecified aspiration pneumonia type, unspecified laterality, unspecified part of lung: Aspiration pneumonia, unspecified aspiration pneumonia type, unspecified laterality, unspecified part of lung  Advance care planning: Advance care planning  Pneumonia: Pneumonia  Encounter for palliative care: Encounter for palliative care  Basal ganglia hemorrhage: Basal ganglia hemorrhage  Hypothyroid: Hypothyroid  Acute respiratory failure with hypoxia: Acute respiratory failure with hypoxia  Hypertensive emergency: Hypertensive emergency  Intraventricular hemorrhage: Intraventricular hemorrhage  Hemorrhagic stroke: Hemorrhagic stroke  Intracranial bleed: Intracranial bleed        PAST MEDICAL & SURGICAL HISTORY:  Hypothyroid  HTN (hypertension)          Vital Signs Last 24 Hrs  T(C): --  T(F): --  HR: 99 (15 Oct 2019 04:47) (99 - 115)  BP: 102/63 (15 Oct 2019 04:47) (102/63 - 102/63)  BP(mean): --  RR: 24 (15 Oct 2019 04:47) (24 - 28)  SpO2: 93% (15 Oct 2019 04:47) (93% - 93%)T(C): --  HR: 99 (10-15-19 @ 04:47) (99 - 115)  BP: 102/63 (10-15-19 @ 04:47) (102/63 - 102/63)  RR: 24 (10-15-19 @ 04:47) (24 - 28)  SpO2: 93% (10-15-19 @ 04:47) (93% - 93%)  Wt(kg): --    PHYSICAL EXAM:    GENERAL: NAD,   HEAD:  Atraumatic, Normocephalic  NERVOUS SYSTEM:  doesnt  Move upper and lower extremities; CNS-II-XII  CHEST/LUNG: Clear to auscultation bilaterally; No rales, rhonchi, wheezing,   HEART: Regular rate and rhythm; No murmurs,   ABDOMEN: Soft, Nontender, Nondistended; Bowel sounds present  EXTREMITIES:  Peripheral Pulses, No  cyanosis, or edema  psychiatry- mood and affect cant assess    ALBUTerol    0.083% 10 milliGRAM(s) Nebulizer once  chlorhexidine 2% Cloths 1 Application(s) Topical daily  glycopyrrolate Injectable 0.2 milliGRAM(s) IV Push every 6 hours PRN  levothyroxine Injectable 25 MICROGram(s) IV Push at bedtime  LORazepam   Injectable 2 milliGRAM(s) IV Push every 2 hours PRN  LORazepam   Injectable 2 milliGRAM(s) IV Push every 4 hours  metoprolol tartrate Injectable 2.5 milliGRAM(s) IV Push every 12 hours  morphine  Infusion 4 mG/Hr IV Continuous <Continuous>  pantoprazole  Injectable 40 milliGRAM(s) IV Push daily  scopolamine   Patch 1 Patch Transdermal every 72 hours      LABS:                      CAPILLARY BLOOD GLUCOSE          RADIOLOGY & ADDITIONAL TESTS:      Consultant notes reviewed    Case discussed with consultant/provider/ family /patient

## 2019-10-15 NOTE — PROGRESS NOTE ADULT - SUBJECTIVE AND OBJECTIVE BOX
CC:  follow up symptoms  INTERVAL HPI/OVERNIGHT EVENTS:  Patient now on comfort care  spoke to nurse Aiyana Pritchard about q2 hours to help with deep breathing with associated agitation  Family very distressed with this sympton  Reported by nurse- Ativan has helped much    PRESENT SYMPTOMS: SOURCE:  Patient/Family/Team    PAIN SCALE:  0 = none  1 = mild   2 = moderate  3 = severe    Pain:     Dyspnea:  [x ] YES [ ] NO  Anxiety:  [ x] YES [ ] NO  Fatigue: [x ] YES [ ] NO  Nausea: [ ] YES [x ] NO  Loss of Appetite: [ x] YES [ ] NO  Other symptoms: __________    MEDICATIONS  (STANDING):  ALBUTerol    0.083% 10 milliGRAM(s) Nebulizer once  chlorhexidine 2% Cloths 1 Application(s) Topical daily  levothyroxine Injectable 25 MICROGram(s) IV Push at bedtime  LORazepam   Injectable 2 milliGRAM(s) IV Push every 4 hours  metoprolol tartrate Injectable 2.5 milliGRAM(s) IV Push every 12 hours  morphine  Infusion 4 mG/Hr (4 mL/Hr) IV Continuous <Continuous>  pantoprazole  Injectable 40 milliGRAM(s) IV Push daily  scopolamine   Patch 1 Patch Transdermal every 72 hours    MEDICATIONS  (PRN):  glycopyrrolate Injectable 0.2 milliGRAM(s) IV Push every 6 hours PRN Excessive Secretions  LORazepam   Injectable 2 milliGRAM(s) IV Push every 2 hours PRN agitation or anxiety      Allergies    No Known Allergies    Intolerances    Karnofsky Performance Score/Palliative Performance Status Version 2:   10  %    Vital Signs Last 24 Hrs  T(C): 38.3 (14 Oct 2019 12:53), Max: 38.3 (14 Oct 2019 12:53)  T(F): 100.9 (14 Oct 2019 12:53), Max: 100.9 (14 Oct 2019 12:53)  HR: 99 (15 Oct 2019 04:47) (99 - 117)  BP: 102/63 (15 Oct 2019 04:47) (102/63 - 147/66)  BP(mean): --  RR: 8-10  SpO2: 93% (15 Oct 2019 04:47) (89% - 93%)    PHYSICAL EXAM:    General: Obtunded    HEENT: [ x] normal  [ ] dry mouth  [ ] ET tube/trach    Lungs: Periods of apnea with occassional deep breath    CV: [x ] normal  [ ] tachycardia    GI: [ x] normal  [ ] distended  [ ] tender  [ ] no BS               [ ] PEG/NG/OG tube    : [ ] normal  [x ] incontinent  [ ] oliguria/anuria  [ ] montejo    MSK: [ ] normal  [x ] weakness  [ ] edema             [ ] ambulatory  [ x] bedbound/wheelchair bound    Skin: [ ] normal  [ ] pressure ulcers- Stage_____  [x ] no rash    LABS:    I&O's Summary    14 Oct 2019 07:01  -  15 Oct 2019 07:00  --------------------------------------------------------  IN: 52 mL / OUT: 1100 mL / NET: -1048 mL      ADVANCE DIRECTIVES:  [x ] YES [ ] NO    DNR: [x ] YES [ ] NO      Date Completed:                    TAYLOR [x ] YES [ ] NO   Date Completed:       Thank you for the opportunity to assist with the care of this patient.   [place] [facility] Consult [facility] [phone].

## 2019-10-15 NOTE — PROGRESS NOTE ADULT - PROBLEM SELECTOR PLAN 3
Assist in all ADLs  [place] patient to surroundings  Maintain safety and fall precautions as well as aspiration precautions
Complete IV abx
Issues of secretions causing  respiratory compromise 2/2 aspiration.   Trach/Peg has been discussed with family
NO PEG  NGT d/c - will not change overall prognosis. Will likely add more burden and discomfort
cont IV abx  aspiration precautions
cont IV abx  cont suctioning
cont abx
cont to monitor  completed course of IV abx  likely central in origin
on NGT feedings.  Discussed with Neuro - family reported would NOT want feeding tube.
started on IV abx- Cefepime

## 2019-10-15 NOTE — PROGRESS NOTE ADULT - ASSESSMENT
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 Mental status remains poor, issues of secretions  causing aspiration and respiratory compromise. Patient now dying, on comfort care. Prognosis minutes to hours.

## 2019-10-15 NOTE — PROGRESS NOTE ADULT - PROBLEM SELECTOR PROBLEM 3
Acute respiratory failure with hypoxia
Debility
Aspiration pneumonia, unspecified aspiration pneumonia type, unspecified laterality, unspecified part of lung
Dysphagia
Fever
Pneumonia
Pneumonia

## 2019-10-15 NOTE — PROGRESS NOTE ADULT - PROBLEM SELECTOR PROBLEM 4
Aspiration pneumonia, unspecified aspiration pneumonia type, unspecified laterality, unspecified part of lung
Palliative care encounter
Advance care planning
Dysphagia
Encounter for palliative care
Functional quadriplegia

## 2019-10-15 NOTE — PROGRESS NOTE ADULT - NSHPATTENDINGPLANDISCUSS_GEN_ALL_CORE
SICU team
family t bedside
ED team
NSGy team
SICU team, [facility]
SICU team, [facility]
SICU, NSGy team
Family, SICU staff, all questions answered
SICU team, [facility]
patient rn palliative care  patients son/wife neuro  neuro sx
Daughter, wife, palliative care, PA, RN
Wife at bedside.  Plan for family meeting possibly tomorrow to discuss goals of care.
patient RN wife

## 2019-10-15 NOTE — PROGRESS NOTE ADULT - ASSESSMENT
83 y/o M with hx of HTN, BPH and hypothyroid transferred to [facility] from [facility] for intracranial hemorrhage management and neurosurgical eval. Pt was intubated for transfer. Noted at [facility] with acute large left basal ganglia hemorrhage with intraventricular extension. Upon arrival had emergent EVD placement which was done at the bedside in the ICU. ICH likely secondary to htn emergency, pt was placed on bp control. Also noted with acute respiratory failure with hypoxia, excess secretions and suspected HCAPNA empirically remained on vanco/ cefepime and inhaled tobramycin which was discontinued by ID today.  Pt was able to be extubated on 10/1. Remains with excess secretions and with low grade fevers tmax: 100.8 unclear if related to current infection/ central fevers. S/p R EVD removal 10/4. Radiographically stable on repeat CTh with clearing IVH. Neuro sx/ neuro continued to follow the pt. Pt continues to be unresponsive, minimally responding to commands, remains with NGT in place for feeding, unable to clear his own secretions at times. Continues on scopolamine and suctioning frequently. Overall guarded prognosis, palliative care team continues to follow the patient. Family has agreed to DNR/DNI but to continue with current care. Ongoing discussions with the family in regards to the tx plan, pt would benefit from hospice intervention, family has the patient's grandson wedding on 10/20/19 and they do not want to make any decisions until then. Palliative Care team is following patient- on morphine drip now     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- on morphine drip comfort measures only  -s/p placement and removal of EVD  - remains obtunded with no meaningful recovery noted   - stable repeat ct head   - As per neuro sx no need for repeat ct head unless further decline or change in mental status  - c/w neurochecks per routine   - hold PEG medications/ feeding   - remains with dysphagia/ unresponsiveness  - pt unable to clear secretions  - c/w scopolamine and frequent suctioning   - Noted pulmonary toilet  - will repeat cxr and see if fluid overloaded- possible lasix prn if needed   - montejo to remain in place   - frequent repositioning   - Chest PT  - neuro f/u noted and appreciated   - neuro sx f/u noted and appreciated   family only wants     Hyperkalemia: -order BMP repeat K= 5.5- still high - given Kayexalate 30 once today  Monitor    Fever unclear etiology  - s/p abx tx for HCAPNA/ aspiration PNA, clinical concern that low grade fevers are central   - afebrile  tmax: 99.7  - leukocytosis likely reactive  /WBC=10.10  - d/c all abx per ID on 10/8   Seizure   - c/w primidone ngt qhs     Hypothyroid  - tsh wnl   - c/w synthroid 25mcg IV QHS      DVT ppx  - c/w Lovenox sq qd   - scd boots b/l    Activity level: Bedbound     Advanced Directive: DNR DNI

## 2019-10-15 NOTE — DISCHARGE NOTE FOR THE EXPIRED PATIENT - HOSPITAL COURSE
83 y/o M with hx of HTN, BPH and hypothyroid transferred to [facility] from [facility] for intracranial hemorrhage management and neurosurgical eval. Pt was intubated for transfer. Noted at [facility] with acute large left basal ganglia hemorrhage with intraventricular extension. Upon arrival had emergent EVD placement which was done at the bedside in the ICU. ICH likely secondary to htn emergency, pt was placed on bp control. Also noted with acute respiratory failure with hypoxia, excess secretions and suspected HCAPNA empirically treated with IV antibiotics.  Pt was able to be extubated on 10/1. S/p R EVD removal 10/4. Radiographically stable on repeat CTh with clearing IVH. Palliative care team continues to follow the patient. Family has agreed to DNR/DNI.  Palliative Care team was following patient- placed on morphine drip.    Patient found unresponsive.  Pupils fixed and dilated. Absent lung and heart sounds.  Patient pronounced 1505.  Spouse and daughter at bedside.  Emotional support provided.

## 2019-10-15 NOTE — PROGRESS NOTE ADULT - PROVIDER SPECIALTY LIST ADULT
Hospitalist
NSICU
Neurology
Neurosurgery
Palliative Care
Rehab Medicine
SICU
Neurology
Neurology
Hospitalist
NSICU
SICU
SICU
Hospitalist

## 2019-10-15 NOTE — PROGRESS NOTE ADULT - PROBLEM SELECTOR PROBLEM 1
Basal ganglia hemorrhage
Intracranial bleed
Acute respiratory failure with hypoxia
Basal ganglia hemorrhage
Hemorrhagic stroke
Intracranial bleed

## 2019-10-15 NOTE — PROGRESS NOTE ADULT - PROBLEM SELECTOR PLAN 5
Patient on comfort care.  Met with wife - she knows he is dying. She stated "He is going" . Emotional support.  Medications adjusted for comfort  Patient with periods of apnea- prognosis minutes/hours.

## 2019-10-15 NOTE — PROGRESS NOTE ADULT - REASON FOR ADMISSION
ICH
ICH, s/p EVD 9/27
ICH

## 2019-10-15 NOTE — PROGRESS NOTE ADULT - PROBLEM SELECTOR PROBLEM 2
Hemorrhagic stroke
Dyspnea
Acute respiratory failure with hypoxia
Dysphagia
Dysphagia
Dyspnea
Fever
Fever
Hemorrhagic stroke

## 2019-10-16 LAB
CULTURE RESULTS: SIGNIFICANT CHANGE UP
CULTURE RESULTS: SIGNIFICANT CHANGE UP
SPECIMEN SOURCE: SIGNIFICANT CHANGE UP
SPECIMEN SOURCE: SIGNIFICANT CHANGE UP

## 2019-11-13 LAB
BASE EXCESS BLDV CALC-SCNC: 0.3 MMOL/L — SIGNIFICANT CHANGE UP (ref -3–3)
GAS PNL BLDV: SIGNIFICANT CHANGE UP
HCO3 BLDV-SCNC: 25 MMOL/L — SIGNIFICANT CHANGE UP (ref 20–26)
PCO2 BLDV: 46 MMHG — SIGNIFICANT CHANGE UP (ref 35–50)
PH BLDV: 7.36 — SIGNIFICANT CHANGE UP (ref 7.35–7.45)
PO2 BLDV: 92 MMHG — HIGH (ref 25–45)
SAO2 % BLDV: 98 % — HIGH (ref 67–88)

## 2020-03-13 NOTE — GOALS OF CARE CONVERSATION - ADVANCED CARE PLANNING - WHAT MATTERS MOST
comfort
No
Keeping patient comfortable but to also focus on quantity of life too
They  to wait until after wedding on 10/20/19  to make decisions for comfort

## 2020-05-13 PROCEDURE — 94640 AIRWAY INHALATION TREATMENT: CPT

## 2020-05-13 PROCEDURE — 85610 PROTHROMBIN TIME: CPT

## 2020-05-13 PROCEDURE — 97167 OT EVAL HIGH COMPLEX 60 MIN: CPT

## 2020-05-13 PROCEDURE — 87070 CULTURE OTHR SPECIMN AEROBIC: CPT

## 2020-05-13 PROCEDURE — 71045 X-RAY EXAM CHEST 1 VIEW: CPT

## 2020-05-13 PROCEDURE — 97112 NEUROMUSCULAR REEDUCATION: CPT

## 2020-05-13 PROCEDURE — 82945 GLUCOSE OTHER FLUID: CPT

## 2020-05-13 PROCEDURE — 92523 SPEECH SOUND LANG COMPREHEN: CPT

## 2020-05-13 PROCEDURE — 36600 WITHDRAWAL OF ARTERIAL BLOOD: CPT

## 2020-05-13 PROCEDURE — 83930 ASSAY OF BLOOD OSMOLALITY: CPT

## 2020-05-13 PROCEDURE — 86900 BLOOD TYPING SEROLOGIC ABO: CPT

## 2020-05-13 PROCEDURE — 93005 ELECTROCARDIOGRAM TRACING: CPT

## 2020-05-13 PROCEDURE — 84484 ASSAY OF TROPONIN QUANT: CPT

## 2020-05-13 PROCEDURE — 94770: CPT

## 2020-05-13 PROCEDURE — 89051 BODY FLUID CELL COUNT: CPT

## 2020-05-13 PROCEDURE — 94002 VENT MGMT INPAT INIT DAY: CPT

## 2020-05-13 PROCEDURE — 86850 RBC ANTIBODY SCREEN: CPT

## 2020-05-13 PROCEDURE — 84145 PROCALCITONIN (PCT): CPT

## 2020-05-13 PROCEDURE — 82803 BLOOD GASES ANY COMBINATION: CPT

## 2020-05-13 PROCEDURE — 83735 ASSAY OF MAGNESIUM: CPT

## 2020-05-13 PROCEDURE — 70498 CT ANGIOGRAPHY NECK: CPT

## 2020-05-13 PROCEDURE — 80048 BASIC METABOLIC PNL TOTAL CA: CPT

## 2020-05-13 PROCEDURE — 84295 ASSAY OF SERUM SODIUM: CPT

## 2020-05-13 PROCEDURE — 87186 SC STD MICRODIL/AGAR DIL: CPT

## 2020-05-13 PROCEDURE — 87040 BLOOD CULTURE FOR BACTERIA: CPT

## 2020-05-13 PROCEDURE — 85014 HEMATOCRIT: CPT

## 2020-05-13 PROCEDURE — 36415 COLL VENOUS BLD VENIPUNCTURE: CPT

## 2020-05-13 PROCEDURE — 85027 COMPLETE CBC AUTOMATED: CPT

## 2020-05-13 PROCEDURE — 87205 SMEAR GRAM STAIN: CPT

## 2020-05-13 PROCEDURE — 82962 GLUCOSE BLOOD TEST: CPT

## 2020-05-13 PROCEDURE — 80053 COMPREHEN METABOLIC PANEL: CPT

## 2020-05-13 PROCEDURE — 84132 ASSAY OF SERUM POTASSIUM: CPT

## 2020-05-13 PROCEDURE — 70496 CT ANGIOGRAPHY HEAD: CPT

## 2020-05-13 PROCEDURE — 86901 BLOOD TYPING SEROLOGIC RH(D): CPT

## 2020-05-13 PROCEDURE — 97163 PT EVAL HIGH COMPLEX 45 MIN: CPT

## 2020-05-13 PROCEDURE — T1013: CPT

## 2020-05-13 PROCEDURE — 83605 ASSAY OF LACTIC ACID: CPT

## 2020-05-13 PROCEDURE — 97530 THERAPEUTIC ACTIVITIES: CPT

## 2020-05-13 PROCEDURE — 84443 ASSAY THYROID STIM HORMONE: CPT

## 2020-05-13 PROCEDURE — 94003 VENT MGMT INPAT SUBQ DAY: CPT

## 2020-05-13 PROCEDURE — 84100 ASSAY OF PHOSPHORUS: CPT

## 2020-05-13 PROCEDURE — 94760 N-INVAS EAR/PLS OXIMETRY 1: CPT

## 2020-05-13 PROCEDURE — 31720 CLEARANCE OF AIRWAYS: CPT

## 2020-05-13 PROCEDURE — 85730 THROMBOPLASTIN TIME PARTIAL: CPT

## 2020-05-13 PROCEDURE — 82947 ASSAY GLUCOSE BLOOD QUANT: CPT

## 2020-05-13 PROCEDURE — 81001 URINALYSIS AUTO W/SCOPE: CPT

## 2020-05-13 PROCEDURE — 80202 ASSAY OF VANCOMYCIN: CPT

## 2020-05-13 PROCEDURE — 93970 EXTREMITY STUDY: CPT

## 2020-05-13 PROCEDURE — 82330 ASSAY OF CALCIUM: CPT

## 2020-05-13 PROCEDURE — 84157 ASSAY OF PROTEIN OTHER: CPT

## 2020-05-13 PROCEDURE — 97110 THERAPEUTIC EXERCISES: CPT

## 2020-05-13 PROCEDURE — 70450 CT HEAD/BRAIN W/O DYE: CPT

## 2020-05-13 PROCEDURE — 82435 ASSAY OF BLOOD CHLORIDE: CPT

## 2021-03-23 NOTE — PROGRESS NOTE ADULT - PROBLEM SELECTOR PLAN 1
[de-identified] : Patient's a 44-year-old healthy adult male presenting for an annual exam. His last presentation was approximately 2 years ago, at which time he had no complaints. He was exercising regularly, which he is continuing to do, but less so, given the lockdown that he sent this time. He has resumed some exercising. 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 His systems review is entirely negative and he has no specific complaints. He is eating well sleeping well. No night sweats. No chills. No fever.
Neurology following - left basal ganglia hemorrhage with intraventricular extension  Maintain aspiration precautions - now having excessive secretions despite scopalamine patch - will add Robinul IV
1. Extubate when safe  2. EVD placed, CSF drainage, record output  3. cont care
CT head noted- stable  MS remains poor
CT head noted- stable  MS remains poor.
CT noted  continuing neurochecks  neurosx following  - EVD to be d/c
Neuro checks  BP monitoring   Lopressor  EVD drain  Neurology and Neurosx following.
Patient on comfort care
cont O2 support  aspiration precautions
neurochecks  EVD  Neurosx follwoing
supportive care- mental status remains poor
supportive care- mental status remains poor

## 2021-07-28 NOTE — CONSULT NOTE ADULT - CONSULT REQUESTED BY NAME
Phase III Cardiopulmonary Rehab:  Pt attended self pay maintenance exercise session. Vitals and exercise logged per pt.      Dr Cha no

## 2021-12-14 NOTE — PHYSICAL THERAPY INITIAL EVALUATION ADULT - AMBULATION SKILLS, REHAB EVAL
END OF SHIFT NOTE:    Intake/Output  No intake/output data recorded. Voiding: YES  Catheter: NO  Drain:              Stool:  1 occurrences. Stool Assessment  Stool Appearance: Other (comment) (not observed) (12/12/21 0730)    Emesis:  0 occurrences. VITAL SIGNS  Patient Vitals for the past 12 hrs:   Temp Pulse Resp BP SpO2   12/13/21 1537 98.3 °F (36.8 °C) 76 17 (!) 144/77 97 %   12/13/21 1153 98 °F (36.7 °C) 84 18 124/83 99 %   12/13/21 0833 98.6 °F (37 °C) 70 18 131/84 100 %       Pain Assessment  Pain 1  Pain Scale 1: Numeric (0 - 10) (12/13/21 0845)  Pain Intensity 1: 0 (12/13/21 0845)  Patient Stated Pain Goal: 0 (12/13/21 0845)  Pain Reassessment 1: Patient resting w/respiratory rate greater than 10 (12/13/21 0845)  Pain Onset 1: 10 mins ago (12/11/21 1923)  Pain Location 1: Head (12/11/21 1923)  Pain Description 1: Throbbing (12/11/21 1923)  Pain Intervention(s) 1: Medication (see MAR) (12/11/21 1923)    Ambulating  Yes    Additional Information:     Shift report given to oncoming nurse at the bedside.     Seema Ying RN independent

## 2022-02-05 NOTE — PATIENT PROFILE ADULT - NSPROSPIRITUALVALUESFT_GEN_A_NUR
Please perform light activity as tolerated.  Use the pain medicine as needed follow-up with spine specialist as needed as well.  
none

## 2022-08-03 NOTE — PROGRESS NOTE ADULT - ASSESSMENT
Negligible cervical change x 3 hours per RN. AROM augmentation was offered and accepted, meconium fluid noted. SVE 8/100/+1. Discussed possible pitocin augmentation. She reports that contractions have increased in intensity in the last hour. Category I FHTs.   Assessment:  83 yo M with h/o HTN, presents with LBG ICH/IVH, possible seizures. Etiology - like HTN. CTA w/o signs of vascular pathology.  S/p R EVD, @10 now, low ICPs.    Plan:  -please, continue neurochecks q2 hr in ICU settings (accepted by SICU)  -EVD @10 - will repeat CTh, possible challenge to 20 today  -please, maintain SBP<140  -would recommend the following TBF goals: ICP goal< 22, CPP goal 60-70  -sz treatment - please, continue Keppra 1 gr BID  -monitor Na, would recommend 140-145 as a goal  -please, keep Plt > 100,000   -enteral route will be re-established  -will follow    Patient is critically ill and at high risk of death or neurologic complications due to re-bleeding, brain swelling/ compression/ herniation, cardiorespiratory failure.

## 2022-08-17 NOTE — PROVIDER CONTACT NOTE (CRITICAL VALUE NOTIFICATION) - ACTION/TREATMENT ORDERED:
Isotretinoin Counseling: Patient should get monthly blood tests, not donate blood, not drive at night if vision affected, not share medication, and not undergo elective surgery for 6 months after tx completed. Side effects reviewed, pt to contact office should one occur. EKG ORDERED

## 2023-08-01 NOTE — PROGRESS NOTE ADULT - ASSESSMENT
A/P: 83 y/o male transfer from [place] with LBG and IVH. Patient has an EVD @ 10. Repeat CTH done today shows stable BG hemorrhage and improved IVH.     Discussed with Dr. Rock   - Keep SBP< 140 ,   - Keep EVD @10   - Lovenox for DVT prop started today  - continue q1h neurochecks  - Keep HOB >30 degrees  - ICP <22 , CPP 60-70 inform neurosurgery if over parameters.   - continue Keppra  - continue eval OT/Rehab   - continue current management per primary team A/P: 85 y/o male transfer from [place] with LBG and IVH. Patient has an EVD @ 10. Repeat CTH done today shows stable BG hemorrhage and improved IVH.     Discussed with Dr. Rock   - Keep SBP< 140 ,   - Challenge EVD - increase to 15   - Lovenox for DVT prop started today  - continue q1h neurochecks  - Keep HOB >30 degrees  - ICP <22 , CPP 60-70 inform neurosurgery if over parameters.   - continue Keppra  - continue eval OT/Rehab   - continue current management per primary team Ivermectin Counseling:  Patient instructed to take medication on an empty stomach with a full glass of water.  Patient informed of potential adverse effects including but not limited to nausea, diarrhea, dizziness, itching, and swelling of the extremities or lymph nodes.  The patient verbalized understanding of the proper use and possible adverse effects of ivermectin.  All of the patient's questions and concerns were addressed.

## 2024-07-22 NOTE — PROGRESS NOTE ADULT - ASSESSMENT
Maternal Screening Survey      Flowsheet Row Responses   Facility patient discharged from? [facility]   Attempt successful? No   Unsuccessful attempts Attempt 1              Otoniel PICHARDO - Registered Nurse           84y Male who is followed by neurology because of ICH    ICH  Exam unchanged with no improvement.  Likely hypertensive etiology  Avoid anti platelet medications.  Avoid anti coagulant medications.  Off Keppra  Repeat head CT 10/7 grossly stable basal ganglia ICH and intraventricular hemorrhage, no hydrocephalus.  Now DNR, DNI.    Hypertension   Control blood pressure.     Palliative care following.     Discussed with wife and daughter at bedside.
